# Patient Record
Sex: FEMALE | Race: WHITE | NOT HISPANIC OR LATINO | Employment: OTHER | URBAN - METROPOLITAN AREA
[De-identification: names, ages, dates, MRNs, and addresses within clinical notes are randomized per-mention and may not be internally consistent; named-entity substitution may affect disease eponyms.]

---

## 2017-02-09 DIAGNOSIS — G43.909 MIGRAINE WITHOUT STATUS MIGRAINOSUS, NOT INTRACTABLE, UNSPECIFIED MIGRAINE TYPE: Primary | ICD-10-CM

## 2017-02-09 NOTE — TELEPHONE ENCOUNTER
Pending Prescriptions:                       Disp   Refills    SUMAtriptan (IMITREX) 50 MG tablet        9 tabl*1            Sig: Take 1 tablet (50 mg) by mouth at onset of           headache for migraine May repeat dose in 2 hours.           Do not exceed 200 mg in 24 hours    LAST OV WAS ON 06/09/2015        Last Written Prescription Date: 02/09/2016  Last Fill Quantity: 9, # refills: 1  Last Office Visit with [facility], [facility] or [facility] prescribing provider: 06/09/2015       BP Readings from Last 3 Encounters:   02/09/16 110/60   07/10/15 108/58   06/09/15 118/73       Mian MCCANN

## 2017-02-10 NOTE — TELEPHONE ENCOUNTER
Routing refill request to provider for review/approval because:  Patient needs to be seen because it has been more than 1 year since last office visit.  Pt is not scheduled until March6, 2017  Mary Beth Izquierdo RN, BSN

## 2017-02-11 RX ORDER — SUMATRIPTAN 50 MG/1
50 TABLET, FILM COATED ORAL
Qty: 9 TABLET | Refills: 1 | Status: SHIPPED | OUTPATIENT
Start: 2017-02-11 | End: 2017-03-06

## 2017-02-16 DIAGNOSIS — G43.909 MIGRAINE WITHOUT STATUS MIGRAINOSUS, NOT INTRACTABLE, UNSPECIFIED MIGRAINE TYPE: ICD-10-CM

## 2017-02-16 RX ORDER — IBUPROFEN 800 MG/1
800 TABLET, FILM COATED ORAL EVERY 8 HOURS PRN
Qty: 30 TABLET | Refills: 1 | Status: SHIPPED | OUTPATIENT
Start: 2017-02-16 | End: 2017-10-11

## 2017-02-16 NOTE — TELEPHONE ENCOUNTER
Routing refill request to provider for review/approval because:  Patient needs to be seen because it has been more than 1 year since last office visit.  Mary Beth Izquierdo RN, BSN

## 2017-02-16 NOTE — TELEPHONE ENCOUNTER
Pending Prescriptions:                       Disp   Refills    ibuprofen (ADVIL/MOTRIN) 800 MG tablet    30 tab*1            Sig: Take 1 tablet (800 mg) by mouth every 8 hours as           needed for pain    LAST OV 07/15/2015        Last Written Prescription Date: 02/09/2016  Last Quantity: 30, # refills: 1  Last Office Visit with [facility], [facility] or [facility] prescribing provider: 07/10/2015  Next 5 appointments (look out 90 days)     Mar 06, 2017  1:00 PM CST   [facility] with [facility]    [address] [facility] [address] [phone]                   No results found for: CR  No results found for: AST  No results found for: ALT  BP Readings from Last 3 Encounters:   02/09/16 110/60   07/10/15 108/58   06/09/15 118/73     Yvette Dawkins RT(R)

## 2017-03-06 ENCOUNTER — OFFICE VISIT (OUTPATIENT)
Dept: FAMILY MEDICINE | Facility: CLINIC | Age: 42
End: 2017-03-06
Payer: MEDICARE

## 2017-03-06 VITALS
DIASTOLIC BLOOD PRESSURE: 70 MMHG | BODY MASS INDEX: 22.3 KG/M2 | SYSTOLIC BLOOD PRESSURE: 118 MMHG | HEIGHT: 64 IN | OXYGEN SATURATION: 97 % | TEMPERATURE: 98.6 F | HEART RATE: 95 BPM | WEIGHT: 130.6 LBS | RESPIRATION RATE: 18 BRPM

## 2017-03-06 DIAGNOSIS — J30.2 SEASONAL ALLERGIC RHINITIS, UNSPECIFIED ALLERGIC RHINITIS TRIGGER: ICD-10-CM

## 2017-03-06 DIAGNOSIS — G43.909 MIGRAINE WITHOUT STATUS MIGRAINOSUS, NOT INTRACTABLE, UNSPECIFIED MIGRAINE TYPE: ICD-10-CM

## 2017-03-06 PROCEDURE — T1013 SIGN LANG/ORAL INTERPRETER: HCPCS | Mod: U3 | Performed by: FAMILY MEDICINE

## 2017-03-06 PROCEDURE — 99213 OFFICE O/P EST LOW 20 MIN: CPT | Performed by: FAMILY MEDICINE

## 2017-03-06 RX ORDER — ONDANSETRON 4 MG/1
4 TABLET, ORALLY DISINTEGRATING ORAL EVERY 6 HOURS PRN
Qty: 20 TABLET | Refills: 0 | Status: SHIPPED | OUTPATIENT
Start: 2017-03-06 | End: 2019-11-01

## 2017-03-06 RX ORDER — SUMATRIPTAN 50 MG/1
50 TABLET, FILM COATED ORAL
Qty: 9 TABLET | Refills: 5 | Status: SHIPPED | OUTPATIENT
Start: 2017-03-06 | End: 2017-10-11

## 2017-03-06 RX ORDER — MOMETASONE FUROATE MONOHYDRATE 50 UG/1
2 SPRAY, METERED NASAL DAILY
Qty: 1 BOX | Refills: 11 | Status: SHIPPED | OUTPATIENT
Start: 2017-03-06 | End: 2019-01-22

## 2017-03-06 RX ORDER — CETIRIZINE HYDROCHLORIDE 10 MG/1
10 TABLET ORAL EVERY EVENING
Qty: 90 TABLET | Refills: 3 | Status: SHIPPED | OUTPATIENT
Start: 2017-03-06 | End: 2017-05-23 | Stop reason: SINTOL

## 2017-03-06 NOTE — MR AVS SNAPSHOT
"              After Visit Summary   3/6/2017    Sammie Ramirez    MRN: [id number]           Patient Information     Date Of Birth          1975        Visit Information        Provider Department      3/6/2017 1:00 PM Winnie Jean Justin Ray, MD [facility]        Today's Diagnoses     Seasonal allergic rhinitis, unspecified allergic rhinitis trigger        Migraine without status migrainosus, not intractable, unspecified migraine type           Follow-ups after your visit        Who to contact     If you have questions or need follow up information about today's clinic visit or your schedule please contact [facility] directly at [phone].  Normal or non-critical lab and imaging results will be communicated to you by MyChart, letter or phone within 4 business days after the clinic has received the results. If you do not hear from us within 7 days, please contact the clinic through MyChart or phone. If you have a critical or abnormal lab result, we will notify you by phone as soon as possible.  Submit refill requests through [employer] or call your pharmacy and they will forward the refill request to us. Please allow 3 business days for your refill to be completed.          Additional Information About Your Visit        MyChart Information     [employer] lets you send messages to your doctor, view your test results, renew your prescriptions, schedule appointments and more. To sign up, go to [email or web address] . Click on \"Log in\" on the left side of the screen, which will take you to the Welcome page. Then click on \"Sign up Now\" on the right side of the page.     You will be asked to enter the access code listed below, as well as some personal information. Please follow the directions to create your username and password.     Your access code is: [email or web address]  Expires: 2017  1:25 PM     Your access code will  in 90 days. If you need help or a new code, " "please call your [place] clinic or [phone].        Care EveryWhere ID     This is your Care EveryWhere ID. This could be used by other organizations to access your [place] medical records  [id number]        Your Vitals Were     Pulse Temperature Respirations Height Pulse Oximetry Breastfeeding?    95 98.6  F (37  C) (Oral) 18 5' 4\" (1.626 m) 97% No    BMI (Body Mass Index)                   22.42 kg/m2            Blood Pressure from Last 3 Encounters:   03/06/17 118/70   02/09/16 110/60   07/10/15 108/58    Weight from Last 3 Encounters:   03/06/17 130 lb 9.6 oz (59.2 kg)   02/09/16 129 lb 4.8 oz (58.7 kg)   07/10/15 132 lb 1.6 oz (59.9 kg)              Today, you had the following     No orders found for display         Today's Medication Changes          These changes are accurate as of: 3/6/17  1:25 PM.  If you have any questions, ask your nurse or doctor.               These medicines have changed or have updated prescriptions.        Dose/Directions    mometasone 50 MCG/ACT spray   Commonly known as:  NASONEX   This may have changed:  additional instructions   Used for:  Seasonal allergic rhinitis, unspecified allergic rhinitis trigger   Changed by:  Walter Wesley MD        Dose:  2 spray   Spray 2 sprays into both nostrils daily   Quantity:  1 Box   Refills:  11         Stop taking these medicines if you haven't already. Please contact your care team if you have questions.     benzonatate 200 MG capsule   Commonly known as:  TESSALON   Stopped by:  Walter Wesley MD           docusate sodium 100 MG tablet   Commonly known as:  COLACE   Stopped by:  Walter Wesley MD           levocetirizine 5 MG tablet   Commonly known as:  XYZAL   Stopped by:  Walter Wesley MD                Where to get your medicines      These medications were sent to [place] [address] [place] [facility] [address] [facility]  [address] [facility] [address]    Hours:  Tech issues with their phone system " Phone:  [phone]     cetirizine 10 MG tablet    mometasone 50 MCG/ACT spray    SUMAtriptan 50 MG tablet                Primary Care Provider Office Phone # Fax #    Walter Wesley [phone]       [facility] [address]  [facility] [address]        Thank you!     Thank you for choosing [facility]  for your care. Our goal is always to provide you with excellent care. Hearing back from our patients is one way we can continue to improve our services. Please take a few minutes to complete the written survey that you may receive in the mail after your visit with us. Thank you!             Your Updated Medication List - Protect others around you: Learn how to safely use, store and throw away your medicines at www.disposemymeds.org.          This list is accurate as of: 3/6/17  1:25 PM.  Always use your most recent med list.                   Brand Name Dispense Instructions for use    cetirizine 10 MG tablet    zyrTEC    90 tablet    Take 1 tablet (10 mg) by mouth every evening       ibuprofen 800 MG tablet    ADVIL/MOTRIN    30 tablet    Take 1 tablet (800 mg) by mouth every 8 hours as needed for pain       mometasone 50 MCG/ACT spray    NASONEX    1 Box    Spray 2 sprays into both nostrils daily       SUMAtriptan 50 MG tablet    IMITREX    9 tablet    Take 1 tablet (50 mg) by mouth at onset of headache for migraine May repeat dose in 2 hours.  Do not exceed 200 mg in 24 hours       triamcinolone 0.1 % cream    KENALOG    30 g    Apply sparingly to affected area three times daily for 14 days.

## 2017-03-06 NOTE — NURSING NOTE
"Chief Complaint   Patient presents with     Recheck Medication       Initial /70 (BP Location: Right arm, Patient Position: Chair, Cuff Size: Adult Regular)  Pulse 95  Temp 98.6  F (37  C) (Oral)  Resp 18  Ht 5' 4\" (1.626 m)  Wt 130 lb 9.6 oz (59.2 kg)  SpO2 97%  Breastfeeding? No  BMI 22.42 kg/m2 Estimated body mass index is 22.42 kg/(m^2) as calculated from the following:    Height as of this encounter: 5' 4\" (1.626 m).    Weight as of this encounter: 130 lb 9.6 oz (59.2 kg).    Medication Reconciliation: complete    Hayde Han, [facility]      Health Maintenance- Has Been Reviewed.                "

## 2017-03-06 NOTE — PROGRESS NOTES
"  SUBJECTIVE:                                                    Sammie Ramirez is a 41 year old female who presents to clinic today for the following health issues:    Patient presents with:  Recheck Medication    Patient has typical migraines with unilateral headache behind the high with nausea and vomiting with photophobia and phonophobia. Typically she has good relief with sumatriptan at 50 mg.  She previously was having higher frequency of headaches but now just one or 2 per month.    History of allergies.  Also has had allergy shots in the past. Needs nasal steroid occasionally to treat breakthrough symptoms.  Has tried Flonase which caused nausea. Nasonex working very well for but not covered by insurance.      Patient Active Problem List   Diagnosis     Sensorineural hearing loss, bilateral     SND (sensorineural deafness)     Seasonal allergic rhinitis     S/P LEEP of cervix     Migraine headache     Past Surgical History   Procedure Laterality Date     Leep tx, cervical  x 2 per chart     historical       Social History   Substance Use Topics     Smoking status: Never Smoker     Smokeless tobacco: Never Used     Alcohol use Yes      Comment: wine     Family History   Problem Relation Age of Onset     Family History Negative Mother      Family History Negative Father          ROS:  ENT/MOUTH: Nasal congestion and rhinorrhea  NEURO: NEGATIVE for weakness, dizziness or paresthesias    OBJECTIVE:                                                    /70 (BP Location: Right arm, Patient Position: Chair, Cuff Size: Adult Regular)  Pulse 95  Temp 98.6  F (37  C) (Oral)  Resp 18  Ht 5' 4\" (1.626 m)  Wt 130 lb 9.6 oz (59.2 kg)  SpO2 97%  Breastfeeding? No  BMI 22.42 kg/m2Body mass index is 22.42 kg/(m^2).  GENERAL APPEARANCE: healthy, alert and no distress  CV: regular rates and rhythm and no murmur, click or rub  NEURO: Normal strength and tone, mentation intact and speech normal     ASSESSMENT/PLAN:             "                                        1. Seasonal allergic rhinitis, unspecified allergic rhinitis trigger  Patient okbisi for prior authorization has she has used Flonase and that caused side effect with nausea so recommend Nasonex.  Has tried prescription antihistamines but did not work as well and caused sedation, prescription sent for Zyrtec and julee for PA as well.  - mometasone (NASONEX) 50 MCG/ACT spray; Spray 2 sprays into both nostrils daily  Dispense: 1 Box; Refill: 11  - cetirizine (ZYRTEC) 10 MG tablet; Take 1 tablet (10 mg) by mouth every evening  Dispense: 90 tablet; Refill: 3    2. Migraine without status migrainosus, not intractable, unspecified migraine type  Continue Imitrex with NSAID for abortive medication. Discussed consider daily preventative medication if symptoms not well controlled.  - SUMAtriptan (IMITREX) 50 MG tablet; Take 1 tablet (50 mg) by mouth at onset of headache for migraine May repeat dose in 2 hours.  Do not exceed 200 mg in 24 hours  Dispense: 9 tablet; Refill: 5  - ondansetron (ZOFRAN ODT) 4 MG ODT tab; Take 1 tablet (4 mg) by mouth every 6 hours as needed for nausea  Dispense: 20 tablet; Refill: 0    Walter Wesley MD  [facility]

## 2017-03-13 ENCOUNTER — TELEPHONE (OUTPATIENT)
Dept: FAMILY MEDICINE | Facility: CLINIC | Age: 42
End: 2017-03-13

## 2017-03-13 NOTE — TELEPHONE ENCOUNTER
Appointment not needed at this time.      We did not talk about work restrictions from allergies that I can remember.  If she is having allergies that are affecting her to the point of needing work restrictions she should probably see an allergist and consider allergy shots.     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  Forms that were dropped off have areas to complete for physical work restrictions - there are no physical restrictions so I'm not sure those forms accurately show the issue.  I can fill them out if needed for temporary restriction but allergies should be able to be treated and if not adequate treatment consider allergist follow-up.

## 2017-03-13 NOTE — TELEPHONE ENCOUNTER
Reason for Call:  Form, our goal is to have forms completed with 72 hours, however, some forms may require a visit or additional information.    Type of letter, form or note:  employer, [employer] Workability Report    Who is the form from?: [employer]    Where did the form come from: Patient dropped off.    What clinic location was the form placed at?: [facility]    Where the form was placed:  bin at     What number is listed as a contact on the form?:Sammie Ramirez [phone] will        Additional comments: last ov 3/6/17 with Dr Wesley for work comp.      Call taken on 3/13/2017 at 11:09 AM by Barbie Frank    Reason for Call:  Form, our goal is to have forms completed with 72 hours, however, some forms may require a visit or additional information.    Type of letter, form or note:  employer, [employer] Accommodation Questionnaire    Who is the form from?: [employer]    Where did the form come from: Pt dropped off    What clinic location was the form placed at?: [facility]    Where the form was placed:  bin at     What number is listed as a contact on the form?: Sammie Ramirez, ph. [phone], will        Additional comments: last ov 3/6/17 with Dr Wesley for work comp.    Call taken on 3/13/2017 at 11:15 AM by Barbie Frank

## 2017-03-13 NOTE — LETTER
[facility]          [address] [place] [address]                                                                                                       [phone]      Re: Sammie MARIA TERESA Ramirez  : 1975    To Whom It May Concern,    Patient has history of environmental allergies as well as migraine headaches.  Patient has had worsening allergies secondary to dust exposure at current work environment. This worsening has resulted in increased frequency of migraine headaches as a trigger for her headaches. Due to this issue we would recommend patient be moved from exposure to back room where there is significant dust exposure and be allowed to work in other areas if this is feasible.    If you have any questions or concerns, please contact our clinic [phone] or my direct line at [phone]      Sincerely,        Walter Wesley MD

## 2017-03-13 NOTE — TELEPHONE ENCOUNTER
Patient called back - she does not understand why she needs an appt when she just saw Dr Wesley last week - wants Dr Wesley to confirm that she needs to be seen before she will schedule

## 2017-03-13 NOTE — TELEPHONE ENCOUNTER
LVM-please have patient make a follow up visit with Dr. Wesley to fill out forms.      Form's are placed in form folder at the [employer].    Orin Woodard

## 2017-03-15 NOTE — TELEPHONE ENCOUNTER
LMR for patient.      Please read letter to patient from PCP due to work restrictions.  If it is ok, I do have a letter ready for  at the  along with the uncompleted forms.    Thanks!    Marjan Schwab

## 2017-03-15 NOTE — TELEPHONE ENCOUNTER
Patient given information below.    Patient states the migraines are due to the dust and would like a letter for this.    Will  forms (uncompleted) and the letter when finished.    Please inform patient.    Marjan Schwab

## 2017-03-16 NOTE — TELEPHONE ENCOUNTER
If patient calls back, please inform her that a letter and uncompleted forms are up at the  for patient to .  Orin Woodard

## 2017-05-01 ENCOUNTER — OFFICE VISIT (OUTPATIENT)
Dept: OBGYN | Facility: CLINIC | Age: 42
End: 2017-05-01
Payer: MEDICARE

## 2017-05-01 VITALS
SYSTOLIC BLOOD PRESSURE: 110 MMHG | HEART RATE: 71 BPM | WEIGHT: 132.1 LBS | HEIGHT: 64 IN | OXYGEN SATURATION: 99 % | DIASTOLIC BLOOD PRESSURE: 70 MMHG | TEMPERATURE: 97.7 F | BODY MASS INDEX: 22.55 KG/M2

## 2017-05-01 DIAGNOSIS — Z00.00 ROUTINE GENERAL MEDICAL EXAMINATION AT A HEALTH CARE FACILITY: Primary | ICD-10-CM

## 2017-05-01 PROCEDURE — 99396 PREV VISIT EST AGE 40-64: CPT | Performed by: FAMILY MEDICINE

## 2017-05-01 PROCEDURE — G0145 SCR C/V CYTO,THINLAYER,RESCR: HCPCS | Performed by: FAMILY MEDICINE

## 2017-05-01 PROCEDURE — T1013 SIGN LANG/ORAL INTERPRETER: HCPCS | Mod: U3 | Performed by: FAMILY MEDICINE

## 2017-05-01 PROCEDURE — G0476 HPV COMBO ASSAY CA SCREEN: HCPCS | Performed by: FAMILY MEDICINE

## 2017-05-01 PROCEDURE — 87624 HPV HI-RISK TYP POOLED RSLT: CPT | Performed by: FAMILY MEDICINE

## 2017-05-01 NOTE — MR AVS SNAPSHOT
After Visit Summary   5/1/2017    Sammie Ramirez    MRN: [id number]           Patient Information     Date Of Birth          1975        Visit Information        Provider Department      5/1/2017 3:00 PM Winnie Duque Kim Marie, DO [facility]        Today's Diagnoses     Routine general medical examination at a health care facility    -  1      Care Instructions    Schedule an appointment to complete your labs. They have Saturday appointments.      Dr. Krystin Sen, DO    OB/GYN   [facility] [phone]  [facility] [phone]            Follow-ups after your visit        Follow-up notes from your care team     Return in about 1 year (around 5/1/2018).      Future tests that were ordered for you today     Open Future Orders        Priority Expected Expires Ordered    CBC with platelets Routine  5/1/2018 5/1/2017    Comprehensive metabolic panel Routine  5/1/2018 5/1/2017    Lipid panel reflex to direct LDL Routine  5/1/2018 5/1/2017    TSH with free T4 reflex Routine  5/1/2018 5/1/2017            Who to contact     If you have questions or need follow up information about today's clinic visit or your schedule please contact [facility] directly at [phone].  Normal or non-critical lab and imaging results will be communicated to you by [employer], letter or phone within 4 business days after the clinic has received the results. If you do not hear from us within 7 days, please contact the clinic through [employer] or phone. If you have a critical or abnormal lab result, we will notify you by phone as soon as possible.  Submit refill requests through [employer] or call your pharmacy and they will forward the refill request to us. Please allow 3 business days for your refill to be completed.          Additional Information About Your Visit        [employer] Information     [employer] lets you send messages to your doctor, view your test  "results, renew your prescriptions, schedule appointments and more. To sign up, go to [email or web address] . Click on \"Log in\" on the left side of the screen, which will take you to the Welcome page. Then click on \"Sign up Now\" on the right side of the page.     You will be asked to enter the access code listed below, as well as some personal information. Please follow the directions to create your username and password.     Your access code is: [email or web address]  Expires: 2017  2:25 PM     Your access code will  in 90 days. If you need help or a new code, please call your [place] clinic or [phone].        Care EveryWhere ID     This is your Care EveryWhere ID. This could be used by other organizations to access your [place] medical records  [id number]        Your Vitals Were     Pulse Temperature Height Last Period Pulse Oximetry Breastfeeding?    71 97.7  F (36.5  C) (Oral) 5' 4\" (1.626 m) 2017 (Exact Date) 99% No    BMI (Body Mass Index)                   22.67 kg/m2            Blood Pressure from Last 3 Encounters:   17 110/70   17 118/70   16 110/60    Weight from Last 3 Encounters:   17 132 lb 1.6 oz (59.9 kg)   17 130 lb 9.6 oz (59.2 kg)   16 129 lb 4.8 oz (58.7 kg)               Primary Care Provider Office Phone # Fax #    Walter Wesley [phone]       [facility] [address] [facility] [address]        Thank you!     Thank you for choosing [facility]  for your care. Our goal is always to provide you with excellent care. Hearing back from our patients is one way we can continue to improve our services. Please take a few minutes to complete the written survey that you may receive in the mail after your visit with us. Thank you!             Your Updated Medication List - Protect others around you: Learn how to safely use, store and throw away your medicines at www.disposemymeds.org.          This " list is accurate as of: 5/1/17  3:36 PM.  Always use your most recent med list.                   Brand Name Dispense Instructions for use    cetirizine 10 MG tablet    zyrTEC    90 tablet    Take 1 tablet (10 mg) by mouth every evening       ibuprofen 800 MG tablet    ADVIL/MOTRIN    30 tablet    Take 1 tablet (800 mg) by mouth every 8 hours as needed for pain       mometasone 50 MCG/ACT spray    NASONEX    1 Box    Spray 2 sprays into both nostrils daily       ondansetron 4 MG ODT tab    ZOFRAN ODT    20 tablet    Take 1 tablet (4 mg) by mouth every 6 hours as needed for nausea       SUMAtriptan 50 MG tablet    IMITREX    9 tablet    Take 1 tablet (50 mg) by mouth at onset of headache for migraine May repeat dose in 2 hours.  Do not exceed 200 mg in 24 hours       triamcinolone 0.1 % cream    KENALOG    30 g    Apply sparingly to affected area three times daily for 14 days.

## 2017-05-01 NOTE — PROGRESS NOTES
SUBJECTIVE:  Sammie Ramirez is an 41 year old No obstetric history on file. woman who presents for   annual gyn exam. Patient's last menstrual period was 04/20/2017 (exact date). Periods are regular q 28-30 days, lasting   4-5 days. Dysmenorrhea:mild, occurring first 1-2 days of flow. Cyclic symptoms   include breast tenderness. Menarche at age 13. No intermenstrual bleeding,   spotting, or discharge.    Current contraception: none  BINA exposure: no  History of abnormal Pap smear: No  Family history of uterine or ovarian cancer: No  Regular self breast exam: Yes  History of abnormal mammogram: No  Family history of breast cancer: No  History of abnormal lipids: No    Past Medical History:   Diagnosis Date     ASCUS (atypical squamous cells of undetermined significance) on Pap smear 10/13, 2016    Neg HPV     LSIL (low grade squamous intraepithelial lesion) on Pap smear 2/2014        Family History   Problem Relation Age of Onset     Family History Negative Mother      Family History Negative Father        Past Surgical History:   Procedure Laterality Date     LEEP TX, CERVICAL  x 2 per chart    historical       Current Outpatient Prescriptions   Medication     mometasone (NASONEX) 50 MCG/ACT spray     cetirizine (ZYRTEC) 10 MG tablet     SUMAtriptan (IMITREX) 50 MG tablet     ibuprofen (ADVIL/MOTRIN) 800 MG tablet     triamcinolone (KENALOG) 0.1 % cream     ondansetron (ZOFRAN ODT) 4 MG ODT tab     No current facility-administered medications for this visit.      Allergies   Allergen Reactions     Amoxicillin      Rash, hives     Codeine Sulfate      Lactose      Seasonal Allergies        Social History   Substance Use Topics     Smoking status: Never Smoker     Smokeless tobacco: Never Used     Alcohol use Yes      Comment: wine       Review Of Systems  Ears/Nose/Throat: negative  Respiratory: No shortness of breath, dyspnea on exertion, cough, or hemoptysis  Cardiovascular: negative  Gastrointestinal:  "negative  Genitourinary: negative    This document serves as a record of the services and decisions personally performed and made by Krystin Sen DO. It was created on his/her behalf by Seamus Trotter, a trained medical scribe. The creation of this document is based the provider's statements to the medical scribe.  Scribe Seamus Trotter 3:29 PM, May 1, 2017    OBJECTIVE:  /70 (BP Location: Right arm, Patient Position: Chair, Cuff Size: Adult Regular)  Pulse 71  Temp 97.7  F (36.5  C) (Oral)  Ht 5' 4\" (1.626 m)  Wt 132 lb 1.6 oz (59.9 kg)  LMP 04/20/2017 (Exact Date)  SpO2 99%  Breastfeeding? No  BMI 22.67 kg/m2    General appearance: healthy, alert and no distress  Skin: Skin color, texture, turgor normal. No rashes or lesions.  Ears: negative  Nose/Sinuses: Nares normal. Septum midline. Mucosa normal. No drainage or sinus tenderness.  Oropharynx: Lips, mucosa, and tongue normal. Teeth and gums normal.  Neck: Neck supple. No adenopathy. Thyroid symmetric, normal size,, Carotids without bruits.  Lungs: negative, Percussion normal. Good diaphragmatic excursion. Lungs clear  Heart: negative, PMI normal. No lifts, heaves, or thrills. RRR. No murmurs, clicks gallops or rub  Breasts: Inspection negative. No nipple discharge or bleeding. No masses.  Abdomen: Abdomen soft, non-tender. BS normal. No masses, organomegaly  Pelvic: Pelvic:  Pelvic examination with pap/ Gonorrhea and Chlamydia   including  External genitalia normal   and vagina normal rugatted not atrophic  Examination of urethra  normal no masses, tenderness, scarring  bladder, no masses or tenderness  Cervix no lesions or discharge  Bimanual exam with   Uterus 6 weeks size, mid position, mobile, non-tenderness, no descent   Adnexa/parametria  normal  Rectovaginal deferred    ASSESSMENT:  Sammie Ramirez is an 41 year old No obstetric history on file. woman who presents for   annual gyn exam. Concerns: none    PLAN:  Dx:  1)  Pap smear  2)  " Mammography, lipids at appropriate intervals    Rx:  N/A    PE:  Reviewed health maintenance including diet, regular exercise   and periodic exams.    The information in this document, created by the medical scribe for me, accurately reflects the services I personally performed and the decisions made by me. I have reviewed and approved this document for accuracy prior to leaving the patient care area.    3:29 PM, 05/01/17    Dr. Krystin Sen, DO    Obstetrics and Gynecology  [facility]

## 2017-05-01 NOTE — LETTER
May 9, 2017    Sammie Ramirez  [address] [facility] [place] [address]    Dear Sammie,  We are happy to inform you that your PAP smear result from 05/01/17 is normal.  We are now able to do a follow up test on PAP smears. The DNA test is for HPV (Human Papilloma Virus). Cervical cancer is closely linked with certain types of HPV. Your result showed no evidence of high risk HPV.  Therefore we recommend you return in 1 year for your next pap smear and HPV test.  You will also need to return to the clinic every year for an annual exam and other preventive tests.  Please contact the clinic at [phone] with any questions.  Sincerely,    Krystin Sen DO/glenn

## 2017-05-01 NOTE — NURSING NOTE
"Chief Complaint   Patient presents with     Physical       Initial /70 (BP Location: Right arm, Patient Position: Chair, Cuff Size: Adult Regular)  Pulse 71  Temp 97.7  F (36.5  C) (Oral)  Ht 5' 4\" (1.626 m)  Wt 132 lb 1.6 oz (59.9 kg)  LMP 04/20/2017 (Exact Date)  SpO2 99%  Breastfeeding? No  BMI 22.67 kg/m2 Estimated body mass index is 22.67 kg/(m^2) as calculated from the following:    Height as of this encounter: 5' 4\" (1.626 m).    Weight as of this encounter: 132 lb 1.6 oz (59.9 kg).  Medication Reconciliation: complete   TANNER Hubbard      "

## 2017-05-03 LAB
COPATH REPORT: NORMAL
PAP: NORMAL

## 2017-05-05 LAB
FINAL DIAGNOSIS: NORMAL
HPV HR 12 DNA CVX QL NAA+PROBE: NEGATIVE
HPV16 DNA SPEC QL NAA+PROBE: NEGATIVE
HPV18 DNA SPEC QL NAA+PROBE: NEGATIVE
SPECIMEN DESCRIPTION: NORMAL

## 2017-05-23 ENCOUNTER — TELEPHONE (OUTPATIENT)
Dept: FAMILY MEDICINE | Facility: CLINIC | Age: 42
End: 2017-05-23

## 2017-05-23 RX ORDER — DIPHENHYDRAMINE HCL 25 MG
25-50 TABLET ORAL
Qty: 60 TABLET | Refills: 1 | COMMUNITY
Start: 2017-05-23 | End: 2017-10-11

## 2017-05-23 RX ORDER — FEXOFENADINE HCL 180 MG/1
180 TABLET ORAL DAILY
Qty: 90 TABLET | Refills: 0 | COMMUNITY
Start: 2017-05-23 | End: 2021-03-03

## 2017-05-23 NOTE — TELEPHONE ENCOUNTER
"Pt calling with increased allergy symptoms -   \"my eyes are very red and swollen\"    Pt states she is taking Allegra 180 mg am and benadryl at hs.   She has tried multiple OTC eye drops however she is not sure which ones but does know she has used \"Visine with no help\"     Advised as medications are not helpful may want to see allergist -  Pt states she has seen one in the past and is waiting on records to review them before she decides about seeing one again.   Can try OCT Zaditor or Opcon A along with saline eye drops.   Continue with antihistamines.   Would not use Visine as this can make symptoms worse.     Be seen if not improving.     Pt expressed understanding and acceptance of the plan.  Pt had no further questions at this time.  Advised can call back to clinic at any time with concerns.       Marysol Moralez RN    "

## 2017-10-04 ENCOUNTER — TELEPHONE (OUTPATIENT)
Dept: FAMILY MEDICINE | Facility: CLINIC | Age: 42
End: 2017-10-04

## 2017-10-04 NOTE — TELEPHONE ENCOUNTER
"Pt calling states she has paperwork and letter done for SSI disability-   She states she has this due to being deaf and \"severe migraines\" and she can only work PT due to these issues.     Advised pt needs appt to discuss this with PCP to review forms and writer letter.     Pt expressed understanding and acceptance of the plan.  Pt had no further questions at this time.  Advised can call back to clinic at any time with concerns.       Marysol Moralez RN    "

## 2017-10-11 ENCOUNTER — OFFICE VISIT (OUTPATIENT)
Dept: FAMILY MEDICINE | Facility: CLINIC | Age: 42
End: 2017-10-11
Payer: MEDICARE

## 2017-10-11 VITALS
HEART RATE: 53 BPM | OXYGEN SATURATION: 98 % | DIASTOLIC BLOOD PRESSURE: 60 MMHG | SYSTOLIC BLOOD PRESSURE: 110 MMHG | WEIGHT: 134 LBS | RESPIRATION RATE: 19 BRPM | BODY MASS INDEX: 22.88 KG/M2 | HEIGHT: 64 IN | TEMPERATURE: 98.1 F

## 2017-10-11 DIAGNOSIS — G43.909 MIGRAINE WITHOUT STATUS MIGRAINOSUS, NOT INTRACTABLE, UNSPECIFIED MIGRAINE TYPE: ICD-10-CM

## 2017-10-11 PROCEDURE — 99213 OFFICE O/P EST LOW 20 MIN: CPT | Performed by: FAMILY MEDICINE

## 2017-10-11 RX ORDER — IBUPROFEN 800 MG/1
800 TABLET, FILM COATED ORAL EVERY 8 HOURS PRN
Qty: 30 TABLET | Refills: 5 | Status: SHIPPED | OUTPATIENT
Start: 2017-10-11 | End: 2018-05-15

## 2017-10-11 RX ORDER — SUMATRIPTAN 50 MG/1
50 TABLET, FILM COATED ORAL
Qty: 9 TABLET | Refills: 5 | Status: SHIPPED | OUTPATIENT
Start: 2017-10-11 | End: 2019-08-13

## 2017-10-11 NOTE — MR AVS SNAPSHOT
"              After Visit Summary   10/11/2017    Sammie Ramirez    MRN: [id number]           Patient Information     Date Of Birth          1975        Visit Information        Provider Department      10/11/2017 1:00 PM Walter Wesley MD; ASL IS [employer]        Today's Diagnoses     Migraine without status migrainosus, not intractable, unspecified migraine type           Follow-ups after your visit        Who to contact     If you have questions or need follow up information about today's clinic visit or your schedule please contact [facility] directly at [phone].  Normal or non-critical lab and imaging results will be communicated to you by Picanovahart, letter or phone within 4 business days after the clinic has received the results. If you do not hear from us within 7 days, please contact the clinic through Picanovahart or phone. If you have a critical or abnormal lab result, we will notify you by phone as soon as possible.  Submit refill requests through Tipjoy or call your pharmacy and they will forward the refill request to us. Please allow 3 business days for your refill to be completed.          Additional Information About Your Visit        MyChart Information     Tipjoy lets you send messages to your doctor, view your test results, renew your prescriptions, schedule appointments and more. To sign up, go to [email or web address] . Click on \"Log in\" on the left side of the screen, which will take you to the Welcome page. Then click on \"Sign up Now\" on the right side of the page.     You will be asked to enter the access code listed below, as well as some personal information. Please follow the directions to create your username and password.     Your access code is: [email or web address]  Expires: 2018  8:59 AM     Your access code will  in 90 days. If you need help or a new code, please call your [facility] or [phone].        Care EveryWhere ID     This " "is your Care EveryWhere ID. This could be used by other organizations to access your [place] medical records  [id number]        Your Vitals Were     Pulse Temperature Respirations Height Pulse Oximetry Breastfeeding?    53 98.1  F (36.7  C) (Oral) 19 5' 4\" (1.626 m) 98% No    BMI (Body Mass Index)                   23 kg/m2            Blood Pressure from Last 3 Encounters:   10/11/17 110/60   05/01/17 110/70   03/06/17 118/70    Weight from Last 3 Encounters:   10/11/17 134 lb (60.8 kg)   05/01/17 132 lb 1.6 oz (59.9 kg)   03/06/17 130 lb 9.6 oz (59.2 kg)              Today, you had the following     No orders found for display         Today's Medication Changes          These changes are accurate as of: 10/11/17 11:59 PM.  If you have any questions, ask your nurse or doctor.               Stop taking these medicines if you haven't already. Please contact your care team if you have questions.     diphenhydrAMINE 25 MG tablet   Commonly known as:  BENADRYL   Stopped by:  Walter Wesley MD                Where to get your medicines      These medications were sent to [facility] [id number]    Hours:  Tech issues with their phone system Phone:  [phone]     ibuprofen 800 MG tablet    SUMAtriptan 50 MG tablet                Primary Care Provider Office Phone # Fax #    Walter Wesley [phone] [address] [facility] [address] [facility] [address]        Equal Access to Services     [facility]: Hadii reyes turner hadasho Soomaali, waaxda luqadaha, qaybta kaalmada ricco taylor idiin hayaan adeeg kharash la'aan . So [facility] [phone].    ATENCIÓN: Si habla español, tiene a carlos disposición servicios gratuitos de asistencia lingüística. Llame al [phone].    We comply with applicable federal civil rights laws and [place] laws. We do not discriminate on the basis of race, color, national origin, age, disability, sex, " sexual orientation, or gender identity.            Thank you!     Thank you for choosing [facility]  for your care. Our goal is always to provide you with excellent care. Hearing back from our patients is one way we can continue to improve our services. Please take a few minutes to complete the written survey that you may receive in the mail after your visit with us. Thank you!             Your Updated Medication List - Protect others around you: Learn how to safely use, store and throw away your medicines at www.disposemymeds.org.          This list is accurate as of: 10/11/17 11:59 PM.  Always use your most recent med list.                   Brand Name Dispense Instructions for use Diagnosis    fexofenadine 180 MG tablet    ALLEGRA    90 tablet    Take 1 tablet (180 mg) by mouth daily        ibuprofen 800 MG tablet    ADVIL/MOTRIN    30 tablet    Take 1 tablet (800 mg) by mouth every 8 hours as needed for pain    Migraine without status migrainosus, not intractable, unspecified migraine type       mometasone 50 MCG/ACT spray    NASONEX    1 Box    Spray 2 sprays into both nostrils daily    Seasonal allergic rhinitis, unspecified allergic rhinitis trigger       ondansetron 4 MG ODT tab    ZOFRAN ODT    20 tablet    Take 1 tablet (4 mg) by mouth every 6 hours as needed for nausea    Migraine without status migrainosus, not intractable, unspecified migraine type       SUMAtriptan 50 MG tablet    IMITREX    9 tablet    Take 1 tablet (50 mg) by mouth at onset of headache for migraine May repeat dose in 2 hours.  Do not exceed 200 mg in 24 hours    Migraine without status migrainosus, not intractable, unspecified migraine type       triamcinolone 0.1 % cream    KENALOG    30 g    Apply sparingly to affected area three times daily for 14 days.    Dermatitis

## 2017-10-11 NOTE — NURSING NOTE
"Chief Complaint   Patient presents with     Letter for School/Work       Initial /60 (BP Location: Right arm, Patient Position: Chair, Cuff Size: Adult Regular)  Pulse 53  Temp 98.1  F (36.7  C) (Oral)  Resp 19  Ht 5' 4\" (1.626 m)  Wt 134 lb (60.8 kg)  SpO2 98%  Breastfeeding? No  BMI 23 kg/m2 Estimated body mass index is 23 kg/(m^2) as calculated from the following:    Height as of this encounter: 5' 4\" (1.626 m).    Weight as of this encounter: 134 lb (60.8 kg).    Medication Reconciliation: complete    Hayde Han [facility]      Health Maintenance- Has Been Reviewed.                "

## 2017-10-11 NOTE — LETTER
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    Patient has history of migraine headaches that may limit her ability to work full time as she is unable to work with migraine symptoms when they occur.  Migraines treated with prescription medication.    Patient with history of seasonal allergies treated with prescription medication.    Please contact me for questions or concerns.      Sincerely,      Walter Wesley MD

## 2017-10-19 NOTE — PROGRESS NOTES
"  SUBJECTIVE:                                                    Sammie Ramirez is a 41 year old female who presents to clinic today for the following health issues:    Patient presents with:  Letter for School/Work    Patient here for a letter for work for intermittent work leave secondary to migraine headache issues.  She needs a letter for work confirming that she has migraine headaches that limit her work ability at times and may need FMLA paperwork stating same. She is getting 1-3 migraine headaches per month and is essentially unable to work when she has them.  Vision is anxious about working full time with this issue and is hoping to not have full-time hours because of work limitation from migraine headaches. Imitrex has been helpful when she needs abortive therapy.    Patient with congenital deafness and also needs a letter stating such for work.    ROS:  ENT/MOUTH: As noted above   NEURO: As noted above    OBJECTIVE:                                                    /60 (BP Location: Right arm, Patient Position: Chair, Cuff Size: Adult Regular)  Pulse 53  Temp 98.1  F (36.7  C) (Oral)  Resp 19  Ht 5' 4\" (1.626 m)  Wt 134 lb (60.8 kg)  SpO2 98%  Breastfeeding? No  BMI 23 kg/m2Body mass index is 23 kg/(m^2).  GENERAL APPEARANCE: healthy, alert and no distress     ASSESSMENT/PLAN:                                                    1. Migraine without status migrainosus, not intractable, unspecified migraine type  Continue current management of migraines with effective abortive therapy and no indication at this time to use prophylactic medication rather avoid triggers at this time. Letter written for work. If to fill out paperwork for FMLA as needed.  - SUMAtriptan (IMITREX) 50 MG tablet; Take 1 tablet (50 mg) by mouth at onset of headache for migraine May repeat dose in 2 hours.  Do not exceed 200 mg in 24 hours  Dispense: 9 tablet; Refill: 5  - ibuprofen (ADVIL/MOTRIN) 800 MG tablet; Take 1 " tablet (800 mg) by mouth every 8 hours as needed for pain  Dispense: 30 tablet; Refill: 5    Walter Wesley MD  [facility]

## 2018-05-15 ENCOUNTER — OFFICE VISIT (OUTPATIENT)
Dept: OBGYN | Facility: CLINIC | Age: 43
End: 2018-05-15
Payer: MEDICARE

## 2018-05-15 VITALS
DIASTOLIC BLOOD PRESSURE: 62 MMHG | BODY MASS INDEX: 22.83 KG/M2 | WEIGHT: 133.7 LBS | HEIGHT: 64 IN | SYSTOLIC BLOOD PRESSURE: 100 MMHG

## 2018-05-15 DIAGNOSIS — Z98.890 S/P LEEP OF CERVIX: Primary | ICD-10-CM

## 2018-05-15 DIAGNOSIS — Z12.31 ENCOUNTER FOR SCREENING MAMMOGRAM FOR MALIGNANT NEOPLASM OF BREAST: ICD-10-CM

## 2018-05-15 DIAGNOSIS — L30.9 DERMATITIS: ICD-10-CM

## 2018-05-15 DIAGNOSIS — G43.909 MIGRAINE WITHOUT STATUS MIGRAINOSUS, NOT INTRACTABLE, UNSPECIFIED MIGRAINE TYPE: ICD-10-CM

## 2018-05-15 DIAGNOSIS — Z00.00 ENCOUNTER FOR MEDICARE ANNUAL WELLNESS EXAM: ICD-10-CM

## 2018-05-15 DIAGNOSIS — Z12.31 ENCOUNTER FOR SCREENING MAMMOGRAM FOR BREAST CANCER: ICD-10-CM

## 2018-05-15 PROCEDURE — 88175 CYTOPATH C/V AUTO FLUID REDO: CPT | Performed by: FAMILY MEDICINE

## 2018-05-15 PROCEDURE — T1013 SIGN LANG/ORAL INTERPRETER: HCPCS | Mod: U3 | Performed by: FAMILY MEDICINE

## 2018-05-15 PROCEDURE — G0101 CA SCREEN;PELVIC/BREAST EXAM: HCPCS | Performed by: FAMILY MEDICINE

## 2018-05-15 PROCEDURE — 87624 HPV HI-RISK TYP POOLED RSLT: CPT | Performed by: FAMILY MEDICINE

## 2018-05-15 PROCEDURE — 99396 PREV VISIT EST AGE 40-64: CPT | Performed by: FAMILY MEDICINE

## 2018-05-15 PROCEDURE — G0476 HPV COMBO ASSAY CA SCREEN: HCPCS | Performed by: FAMILY MEDICINE

## 2018-05-15 PROCEDURE — 88141 CYTOPATH C/V INTERPRET: CPT | Performed by: FAMILY MEDICINE

## 2018-05-15 RX ORDER — IBUPROFEN 800 MG/1
800 TABLET, FILM COATED ORAL EVERY 8 HOURS PRN
Qty: 90 TABLET | Refills: 3 | Status: SHIPPED | OUTPATIENT
Start: 2018-05-15 | End: 2019-09-26

## 2018-05-15 RX ORDER — TRIAMCINOLONE ACETONIDE 1 MG/G
CREAM TOPICAL
Qty: 30 G | Refills: 3 | Status: SHIPPED | OUTPATIENT
Start: 2018-05-15 | End: 2020-05-07

## 2018-05-15 NOTE — MR AVS SNAPSHOT
After Visit Summary   5/15/2018    Sammie Ramirez    MRN: [id number]           Patient Information     Date Of Birth          1975        Visit Information        Provider Department      5/15/2018 2:00 PM Bertha Chavez; Krystin Sen, DO [facility]        Today's Diagnoses     S/P LEEP of cervix    -  1    Encounter for Medicare annual wellness exam          Care Instructions    Return yearly      Call Lab [phone] [place] or [phone] [place] to schedule future labs. You may schedule   The labs at any [facility].  If you are getting cholesterol checked please fast 8 hours     Dr. Krystin Sen, DO    Obstetrics and Gynecology  [facility] [place]                        Follow-ups after your visit        Future tests that were ordered for you today     Open Future Orders        Priority Expected Expires Ordered    CBC with platelets Routine  5/15/2019 5/15/2018    Comprehensive metabolic panel Routine  5/15/2019 5/15/2018    Lipid panel reflex to direct LDL Fasting Routine  5/15/2019 5/15/2018    TSH with free T4 reflex Routine  5/15/2019 5/15/2018            Who to contact     If you have questions or need follow up information about today's clinic visit or your schedule please contact [facility] directly at [phone].  Normal or non-critical lab and imaging results will be communicated to you by MyChart, letter or phone within 4 business days after the clinic has received the results. If you do not hear from us within 7 days, please contact the clinic through GoToTagshart or phone. If you have a critical or abnormal lab result, we will notify you by phone as soon as possible.  Submit refill requests through iWantoo or call your pharmacy and they will forward the refill request to us. Please allow 3 business days for your refill to be completed.          Additional Information About Your Visit        MyChart  "Information     [facility] lets you send messages to your doctor, view your test results, renew your prescriptions, schedule appointments and more. To sign up, go to [email or web address] . Click on \"Log in\" on the left side of the screen, which will take you to the Welcome page. Then click on \"Sign up Now\" on the right side of the page.     You will be asked to enter the access code listed below, as well as some personal information. Please follow the directions to create your username and password.     Your access code is: [email or web address]  Expires: 2018  2:00 PM     Your access code will  in 90 days. If you need help or a new code, please call your [place] clinic or [phone].        Care EveryWhere ID     This is your Care EveryWhere ID. This could be used by other organizations to access your [place] medical records  [id number]        Your Vitals Were     Height BMI (Body Mass Index)                5' 4\" (1.626 m) 22.95 kg/m2           Blood Pressure from Last 3 Encounters:   05/15/18 100/62   10/11/17 110/60   17 110/70    Weight from Last 3 Encounters:   05/15/18 133 lb 11.2 oz (60.6 kg)   10/11/17 134 lb (60.8 kg)   17 132 lb 1.6 oz (59.9 kg)               Primary Care Provider Office Phone # Fax #    Walter Wesley [phone] [address]  [facility] [address]        Equal Access to Services     [facility]: Hadii reyes turner hadasho Soomaali, waaxda luqadaha, qaybta kaalmada ricco taylor. So [facility] [phone].    ATENCIÓN: Si habla español, tiene a carlos disposición servicios gratuitos de asistencia lingüística. Llame al [phone].    We comply with applicable federal civil rights laws and [place] laws. We do not discriminate on the basis of race, color, national origin, age, disability, sex, sexual orientation, or gender identity.            Thank you!     Thank you for choosing [facility]  for " your care. Our goal is always to provide you with excellent care. Hearing back from our patients is one way we can continue to improve our services. Please take a few minutes to complete the written survey that you may receive in the mail after your visit with us. Thank you!             Your Updated Medication List - Protect others around you: Learn how to safely use, store and throw away your medicines at www.disposemymeds.org.          This list is accurate as of 5/15/18  2:11 PM.  Always use your most recent med list.                   Brand Name Dispense Instructions for use Diagnosis    fexofenadine 180 MG tablet    ALLEGRA    90 tablet    Take 1 tablet (180 mg) by mouth daily        ibuprofen 800 MG tablet    ADVIL/MOTRIN    30 tablet    Take 1 tablet (800 mg) by mouth every 8 hours as needed for pain    Migraine without status migrainosus, not intractable, unspecified migraine type       mometasone 50 MCG/ACT spray    NASONEX    1 Box    Spray 2 sprays into both nostrils daily    Seasonal allergic rhinitis, unspecified allergic rhinitis trigger       ondansetron 4 MG ODT tab    ZOFRAN ODT    20 tablet    Take 1 tablet (4 mg) by mouth every 6 hours as needed for nausea    Migraine without status migrainosus, not intractable, unspecified migraine type       SUMAtriptan 50 MG tablet    IMITREX    9 tablet    Take 1 tablet (50 mg) by mouth at onset of headache for migraine May repeat dose in 2 hours.  Do not exceed 200 mg in 24 hours    Migraine without status migrainosus, not intractable, unspecified migraine type       triamcinolone 0.1 % cream    KENALOG    30 g    Apply sparingly to affected area three times daily for 14 days.    Dermatitis

## 2018-05-15 NOTE — PROGRESS NOTES
SUBJECTIVE:  PATIENT DEAF -  PRESENT FOR VISIT  Sammie Ramirze is an 42 year old woman who presents for   annual wellness visit. No LMP recorded. Periods are regular q 28-30 days, lasting   1-2 days. Dysmenorrhea: moderate, lasting throughout menses. Cyclic symptoms include none. No intermenstrual bleeding, spotting, or discharge.  STD hx: none.    Current contraception: none  BINA exposure: no  History of abnormal Pap smear: Yes   Hx of LEEP x 2   2/14/12: LSIL   10/3/12: NIL.  Plan pap in 1 yr.   10/2013 ASCUS neg HPV.  Plan pap in 1 yr. Per [facility] algorithms, needs two  negative cotests at 12 & 24 months after LEEP before going to cotest in 3 yrs.   10/2014: NIL pap, neg HPV. Plan cotest pap & HPV in 1 year. Tracking started.   2/9/2016 Ascus Neg HPV, plan: cotest one year per MD. Tracking.    05/01/17: NIL pap, Neg HR HPV result. Plan cotest in 1 year per provider.  Family history of uterine or ovarian cancer: UNKNOWN  Regular self breast exam: Yes  History of abnormal mammogram: No  Family history of breast cancer: UNKNOWN  History of abnormal lipids: No      - Pt would like to know if there is a possibility of HPV recurrence -- PMHx before she moved here, all testing done since has been negative.    - Pt also reports an itchy, irritated rash on her inner groin bilaterally. Similar episodes occurred during her pregnancy, but has recently presented again. She was previously prescribed a cream, felt this helped alleviate her symptoms.       Past Medical History:   Diagnosis Date     ASCUS (atypical squamous cells of undetermined significance) on Pap smear 10/13, 2016    Neg HPV     LSIL (low grade squamous intraepithelial lesion) on Pap smear 2/2014        Family History   Problem Relation Age of Onset     Family History Negative Mother      Family History Negative Father        Past Surgical History:   Procedure Laterality Date     LEEP TX, CERVICAL  x 2 per chart    historical       Current Outpatient  "Prescriptions   Medication     ibuprofen (ADVIL/MOTRIN) 800 MG tablet     triamcinolone (KENALOG) 0.1 % cream     fexofenadine (ALLEGRA) 180 MG tablet     mometasone (NASONEX) 50 MCG/ACT spray     ondansetron (ZOFRAN ODT) 4 MG ODT tab     SUMAtriptan (IMITREX) 50 MG tablet     No current facility-administered medications for this visit.      Allergies   Allergen Reactions     Amoxicillin      Rash, hives     Codeine Sulfate      Lactose      Seasonal Allergies        Social History   Substance Use Topics     Smoking status: Never Smoker     Smokeless tobacco: Never Used     Alcohol use Yes      Comment: wine       Review Of Systems  Ears/Nose/Throat: negative  Respiratory: No shortness of breath, dyspnea on exertion, cough, or hemoptysis  Cardiovascular: negative  Gastrointestinal: negative  Genitourinary: negative  Constitutional, HEENT, cardiovascular, pulmonary, GI, , musculoskeletal, neuro, skin, endocrine and psych systems are negative, except as otherwise noted.      This document serves as a record of the services and decisions personally performed and made by Krystin Sen DO. It was created on her behalf by Julia Belle, a trained medical scribe. The creation of this document is based the provider's statements to the medical scribe.  Scribe Julia Belle 2:11 PM, May 15, 2018    OBJECTIVE:  /62  Ht 1.626 m (5' 4\")  Wt 60.6 kg (133 lb 11.2 oz)  BMI 22.95 kg/m2  General appearance: healthy, alert and no distress  Skin: Skin color, texture, turgor normal. No rashes or lesions.  Ears: negative  Nose/Sinuses: Nares normal. Septum midline. Mucosa normal. No drainage or sinus tenderness.  Oropharynx: Lips, mucosa, and tongue normal. Teeth and gums normal.  Neck: Neck supple. No adenopathy. Thyroid symmetric, normal size,, Carotids without bruits.  Lungs: negative, Percussion normal. Good diaphragmatic excursion. Lungs clear  Heart: negative, PMI normal. No lifts, heaves, or thrills. RRR. No murmurs, " clicks gallops or rub  Breasts: Inspection negative. No nipple discharge or bleeding. No masses.  Abdomen: Abdomen soft, non-tender. BS normal. No masses, organomegaly  Pelvic: Pelvic examination with pap/without Gonorrhea and Chlamydia   including  External genitalia normal   and vagina normal rugatted not atrophic  Examination of urethra  normal no masses, tenderness, scarring  bladder, no masses or tenderness  Cervix no lesions or discharge  Bimanual exam with   Uterus 6 weeks size, mid position, mobile, no tenderness, no descent   Adnexa/parametria normal        ASSESSMENT:  Sammie Ramirez is an 42 year old woman who presents for   annual wellness visit. Concerns:  No concerns today      PLAN:  Dx: Annual wellness visit; Skin rash  1)  Will return for fasting labs only visit; Pap smear - pathology pending, informed HPV can reoccur & will be checked each year; Mammogram ordered, Pt will complete as informed by Medicare; Ibuprofen rx refilled, to alleviate Dysmenorrhea & Migraines  2)  Skin rash, not present today but in past: Prescription cream refilled, will apply when symptoms arise [not present today during clinic]  3)  Return to clinic in 1 year for annual wellness visit, otherwise prn.        Rx:  - ibuprofen 800 mg, 1 tab po every 8 hours as needed for pain  - Kenalog 0.1%, apply sparingly to affected area TID for 14 days      PE:  Reviewed health maintenance including diet, regular exercise   and periodic exams.        The information in this document, created by the medical scribe for me, accurately reflects the services I personally performed and the decisions made by me. I have reviewed and approved this document for accuracy prior to leaving the patient care area.  2:11 PM, 05/15/18    Dr. Krystin Sen, DO    Obstetrics and Gynecology  [facility]

## 2018-05-15 NOTE — PATIENT INSTRUCTIONS
Return yearly      Call Lab [phone] [place] or [phone] [place] to schedule future labs. You may schedule   The labs at any [place] facitily.  If you are getting cholesterol checked please fast 8 hours     Dr. Krystin Sen, DO    Obstetrics and Gynecology  [facility] [place] [facility] [place]

## 2018-05-15 NOTE — NURSING NOTE
"Chief Complaint   Patient presents with     Gyn Exam     pap due--discuss HPV       Initial /62  Ht 5' 4\" (1.626 m)  Wt 133 lb 11.2 oz (60.6 kg)  BMI 22.95 kg/m2 Estimated body mass index is 22.95 kg/(m^2) as calculated from the following:    Height as of this encounter: 5' 4\" (1.626 m).    Weight as of this encounter: 133 lb 11.2 oz (60.6 kg).  BP completed using cuff size: regular    No obstetric history on file.    The following HM Due: pap smear        "

## 2018-05-22 ENCOUNTER — TELEPHONE (OUTPATIENT)
Dept: AUDIOLOGY | Facility: CLINIC | Age: 43
End: 2018-05-22
Payer: MEDICARE

## 2018-05-22 DIAGNOSIS — H90.3 SENSORINEURAL HEARING LOSS, BILATERAL: Primary | ICD-10-CM

## 2018-05-22 LAB
COPATH REPORT: ABNORMAL
PAP: ABNORMAL

## 2018-05-22 PROCEDURE — V5266 BATTERY FOR HEARING DEVICE: HCPCS | Performed by: AUDIOLOGIST

## 2018-05-22 NOTE — TELEPHONE ENCOUNTER
Patient calls to request her 3 month supply of batteries be mailed to her home address. Mailed 30 size 13 batteries to the address on file.     CHARGES:   .002 x30 Batteries ($1). Total: $30 BILL TO PATIENT DIRECTLY.     Roshan Holbrook CCC-A  Licensed Audiologist #[id number]  5/22/2018

## 2018-05-22 NOTE — TELEPHONE ENCOUNTER
Reason for Call:  Other     Detailed comments: patient asking for more batteries. Patient's address has been verified and would like the batteries mailed to her home along with the bill.    Phone Number Patient can be reached at: Home number on file [phone] (home)    Best Time: any time    Can we leave a detailed message on this number? YES    Call taken on 5/22/2018 at 2:57 PM by Destiny Abreu

## 2018-05-23 LAB
FINAL DIAGNOSIS: NORMAL
HPV HR 12 DNA CVX QL NAA+PROBE: NEGATIVE
HPV16 DNA SPEC QL NAA+PROBE: NEGATIVE
HPV18 DNA SPEC QL NAA+PROBE: NEGATIVE
SPECIMEN DESCRIPTION: NORMAL
SPECIMEN SOURCE CVX/VAG CYTO: NORMAL

## 2018-05-25 ENCOUNTER — TELEPHONE (OUTPATIENT)
Dept: OBGYN | Facility: CLINIC | Age: 43
End: 2018-05-25

## 2018-05-25 NOTE — TELEPHONE ENCOUNTER
Pt calls and asks when she can be seen Dr Sen as schedule is booked far out.    Pt would like to see KT, not a different provider.      Is there a certain day you are okay with me adding this on?  Pt aware she wont hear back until the week after next.    Please original message too for documentation.    Marysol ORDAZ R.N.  [facility]

## 2018-05-25 NOTE — TELEPHONE ENCOUNTER
In order for insurance to cover colposcopy and EMB, it needs to be marked as high risk.  There needs to be explanation of why it needs to be done.  This just needs to be in the charting so that when it goes to billing it can be covered.      Marysol ORDAZ R.N.  [facility]

## 2018-05-29 ENCOUNTER — NURSE TRIAGE (OUTPATIENT)
Dept: NURSING | Facility: CLINIC | Age: 43
End: 2018-05-29

## 2018-05-29 NOTE — TELEPHONE ENCOUNTER
Pt called with a Sign language  Interpretor and wanted to speak to  - Dr Camacho's nurse about scheduling . FNA offered to send a message but Pt prefers to call back after 8am instead and declines further discussion.  .Gisela Lindo RN [place] nurse advisors.

## 2018-06-14 ENCOUNTER — OFFICE VISIT (OUTPATIENT)
Dept: OBGYN | Facility: CLINIC | Age: 43
End: 2018-06-14
Payer: MEDICARE

## 2018-06-14 VITALS — WEIGHT: 131.6 LBS | BODY MASS INDEX: 22.59 KG/M2 | DIASTOLIC BLOOD PRESSURE: 66 MMHG | SYSTOLIC BLOOD PRESSURE: 112 MMHG

## 2018-06-14 DIAGNOSIS — R87.619 ABNORMAL GLANDULAR PAPANICOLAOU SMEAR OF CERVIX: Primary | ICD-10-CM

## 2018-06-14 DIAGNOSIS — Z98.890 S/P LEEP OF CERVIX: ICD-10-CM

## 2018-06-14 PROCEDURE — 88305 TISSUE EXAM BY PATHOLOGIST: CPT | Mod: 26 | Performed by: FAMILY MEDICINE

## 2018-06-14 PROCEDURE — 57454 BX/CURETT OF CERVIX W/SCOPE: CPT | Performed by: FAMILY MEDICINE

## 2018-06-14 PROCEDURE — 88305 TISSUE EXAM BY PATHOLOGIST: CPT | Performed by: FAMILY MEDICINE

## 2018-06-14 PROCEDURE — 58110 BX DONE W/COLPOSCOPY ADD-ON: CPT | Performed by: FAMILY MEDICINE

## 2018-06-14 NOTE — NURSING NOTE
"Chief Complaint   Patient presents with     Colposcopy     and EMB     initial /66  Wt 131 lb 9.6 oz (59.7 kg)  LMP 05/20/2018 (Approximate)  BMI 22.59 kg/m2 Estimated body mass index is 22.59 kg/(m^2) as calculated from the following:    Height as of 5/15/18: 5' 4\" (1.626 m).    Weight as of this encounter: 131 lb 9.6 oz (59.7 kg).  BP completed using cuff size regular.  Jennifer Littlejohn CMA    "

## 2018-06-14 NOTE — MR AVS SNAPSHOT
"              After Visit Summary   6/14/2018    Sammie Ramirez    MRN: [id number]           Patient Information     Date Of Birth          1975        Visit Information        Provider Department      6/14/2018 1:00 PM Krystin Sen DO; [facility]        Today's Diagnoses     Abnormal glandular Papanicolaou smear of cervix    -  1    S/P LEEP of cervix          Care Instructions    Will call with result   Will also need to schedule ultrasound       Dr. Krystin Sen DO    Obstetrics and Gynecology  [facility] and [place]                 Follow-ups after your visit        Future tests that were ordered for you today     Open Future Orders        Priority Expected Expires Ordered    US Pelvic Complete with Transvaginal Routine 6/14/2018 12/11/2018 6/14/2018            Who to contact     If you have questions or need follow up information about today's clinic visit or your schedule please contact [facility] directly at [phone].  Normal or non-critical lab and imaging results will be communicated to you by MyChart, letter or phone within 4 business days after the clinic has received the results. If you do not hear from us within 7 days, please contact the clinic through [employer] or phone. If you have a critical or abnormal lab result, we will notify you by phone as soon as possible.  Submit refill requests through [email or web address] or call your pharmacy and they will forward the refill request to us. Please allow 3 business days for your refill to be completed.          Additional Information About Your Visit        [employer]inder [employer]SeekPanda Information     [email or web address] lets you send messages to your doctor, view your test results, renew your prescriptions, schedule appointments and more. To sign up, go to [email or web address] . Click on \"Log in\" on the left side of the screen, which will take you to the Welcome page. Then click on \"Sign up Now\" on the right side of the " page.     You will be asked to enter the access code listed below, as well as some personal information. Please follow the directions to create your username and password.     Your access code is: [email or web address]  Expires: 2018  2:00 PM     Your access code will  in 90 days. If you need help or a new code, please call your [place] clinic or [phone].        Care EveryWhere ID     This is your Care EveryWhere ID. This could be used by other organizations to access your [place] medical records  [id number]        Your Vitals Were     Last Period BMI (Body Mass Index)                2018 (Approximate) 22.59 kg/m2           Blood Pressure from Last 3 Encounters:   18 112/66   05/15/18 100/62   10/11/17 110/60    Weight from Last 3 Encounters:   18 131 lb 9.6 oz (59.7 kg)   05/15/18 133 lb 11.2 oz (60.6 kg)   10/11/17 134 lb (60.8 kg)              We Performed the Following     COLP VAGINA W CERVIX IF PRES W BIOPSY     ENDOMETRIAL BIOPSY W/O CERVICAL DILATION        Primary Care Provider Office Phone # Fax #    Walter Wesley [phone] [address]  [facility] [address]        Equal Access to Services     MARCEL BETANCOURT : Hadii aad ku hadasho Soomaali, waaxda luqadaha, qaybta kaalmada adeegyada, waxay idiin hayaan remi fermin . So [facility] [phone].    ATENCIÓN: Si habla español, tiene a carlos disposición servicios gratuitos de asistencia lingüística. Llame al [phone].    We comply with applicable federal civil rights laws and [place] laws. We do not discriminate on the basis of race, color, national origin, age, disability, sex, sexual orientation, or gender identity.            Thank you!     Thank you for choosing [facility]  for your care. Our goal is always to provide you with excellent care. Hearing back from our patients is one way we can continue to improve our services. Please take a few minutes to complete the written  survey that you may receive in the mail after your visit with us. Thank you!             Your Updated Medication List - Protect others around you: Learn how to safely use, store and throw away your medicines at www.disposemymeds.org.          This list is accurate as of 6/14/18  1:29 PM.  Always use your most recent med list.                   Brand Name Dispense Instructions for use Diagnosis    fexofenadine 180 MG tablet    ALLEGRA    90 tablet    Take 1 tablet (180 mg) by mouth daily        ibuprofen 800 MG tablet    ADVIL/MOTRIN    90 tablet    Take 1 tablet (800 mg) by mouth every 8 hours as needed for pain    Migraine without status migrainosus, not intractable, unspecified migraine type       mometasone 50 MCG/ACT spray    NASONEX    1 Box    Spray 2 sprays into both nostrils daily    Seasonal allergic rhinitis, unspecified allergic rhinitis trigger       ondansetron 4 MG ODT tab    ZOFRAN ODT    20 tablet    Take 1 tablet (4 mg) by mouth every 6 hours as needed for nausea    Migraine without status migrainosus, not intractable, unspecified migraine type       SUMAtriptan 50 MG tablet    IMITREX    9 tablet    Take 1 tablet (50 mg) by mouth at onset of headache for migraine May repeat dose in 2 hours.  Do not exceed 200 mg in 24 hours    Migraine without status migrainosus, not intractable, unspecified migraine type       triamcinolone 0.1 % cream    KENALOG    30 g    Apply sparingly to affected area three times daily for 14 days.    Dermatitis

## 2018-06-14 NOTE — PATIENT INSTRUCTIONS
Will call with result   Will also need to schedule ultrasound       Dr. Krystin Sen, DO    Obstetrics and Gynecology  [facility] and [place]

## 2018-06-18 LAB — COPATH REPORT: NORMAL

## 2018-06-22 ENCOUNTER — NURSE TRIAGE (OUTPATIENT)
Dept: NURSING | Facility: CLINIC | Age: 43
End: 2018-06-22

## 2018-06-25 ENCOUNTER — TELEPHONE (OUTPATIENT)
Dept: OBGYN | Facility: CLINIC | Age: 43
End: 2018-06-25

## 2018-06-25 DIAGNOSIS — N72 CERVICITIS: Primary | ICD-10-CM

## 2018-06-25 NOTE — LETTER
6/26/2018     Sammie Ramirez  [address] [facility] [address]      To whom it may concern,     The patient has having a medical concern and is excused from work   6/26/19.       Sincerely,         [place] [address]  [facility] [address]  Phone: [phone]

## 2018-06-25 NOTE — TELEPHONE ENCOUNTER
Patient calling regarding colposcopy results.  Results reviewed via .  Please send antibiotic  (mentioned in note) to pharmacy selected.  Patient will  tomorrow afternoon.  Nina Sanchez RN

## 2018-06-26 RX ORDER — AZITHROMYCIN 500 MG/1
1000 TABLET, FILM COATED ORAL ONCE
Qty: 4 TABLET | Refills: 0 | Status: SHIPPED | OUTPATIENT
Start: 2018-06-26 | End: 2018-06-26

## 2018-06-26 NOTE — TELEPHONE ENCOUNTER
Patient is calling checking the status of the antibiotic, she is hoping it can get sent right away. Patient also has a questions regarding the results, please call her at [phone].  Orin Woodard

## 2018-06-26 NOTE — TELEPHONE ENCOUNTER
Discussed with patient   rx for antibiotic called in   Recommend repeat pap smear in 1 year     Dr. Krystin Sen, DO    Obstetrics and Gynecology  [facility] and [place]

## 2019-01-02 ENCOUNTER — TELEPHONE (OUTPATIENT)
Dept: OBGYN | Facility: CLINIC | Age: 44
End: 2019-01-02

## 2019-01-02 DIAGNOSIS — B37.31 YEAST VAGINITIS: Primary | ICD-10-CM

## 2019-01-02 RX ORDER — FLUCONAZOLE 150 MG/1
150 TABLET ORAL ONCE
Qty: 1 TABLET | Refills: 0 | Status: SHIPPED | OUTPATIENT
Start: 2019-01-02 | End: 2023-11-22

## 2019-01-02 NOTE — TELEPHONE ENCOUNTER
Pt calling with . Has been having yeast infection symptoms since Sunday. Took OTC monistat but the medication did not help her symptoms. States she is having itching, but no abnormal discharge. Has had yeast infections in the past and says the symptoms are the same. She would like rx [facility] [address] in [place]. Please advise.      Bertha Pineda RN

## 2019-01-02 NOTE — TELEPHONE ENCOUNTER
rx sent please notify patient    Dr. Krystin Sen, DO    Obstetrics and Gynecology  [facility] and [place]

## 2019-01-22 ENCOUNTER — NURSE TRIAGE (OUTPATIENT)
Dept: NURSING | Facility: CLINIC | Age: 44
End: 2019-01-22

## 2019-01-22 ENCOUNTER — OFFICE VISIT (OUTPATIENT)
Dept: FAMILY MEDICINE | Facility: CLINIC | Age: 44
End: 2019-01-22
Payer: MEDICARE

## 2019-01-22 VITALS
DIASTOLIC BLOOD PRESSURE: 66 MMHG | OXYGEN SATURATION: 98 % | HEART RATE: 66 BPM | HEIGHT: 64 IN | TEMPERATURE: 98.4 F | WEIGHT: 127 LBS | RESPIRATION RATE: 16 BRPM | SYSTOLIC BLOOD PRESSURE: 106 MMHG | BODY MASS INDEX: 21.68 KG/M2

## 2019-01-22 DIAGNOSIS — J06.9 VIRAL URI: Primary | ICD-10-CM

## 2019-01-22 DIAGNOSIS — J30.2 SEASONAL ALLERGIC RHINITIS, UNSPECIFIED TRIGGER: ICD-10-CM

## 2019-01-22 PROCEDURE — 99213 OFFICE O/P EST LOW 20 MIN: CPT | Performed by: NURSE PRACTITIONER

## 2019-01-22 RX ORDER — MOMETASONE FUROATE MONOHYDRATE 50 UG/1
2 SPRAY, METERED NASAL DAILY
Qty: 1 BOX | Refills: 11 | Status: SHIPPED | OUTPATIENT
Start: 2019-01-22 | End: 2019-05-01

## 2019-01-22 RX ORDER — ONDANSETRON 4 MG/1
4 TABLET, ORALLY DISINTEGRATING ORAL EVERY 6 HOURS PRN
Qty: 20 TABLET | Refills: 0 | Status: CANCELLED | OUTPATIENT
Start: 2019-01-22

## 2019-01-22 ASSESSMENT — MIFFLIN-ST. JEOR: SCORE: 1216.07

## 2019-01-22 NOTE — TELEPHONE ENCOUNTER
Sammie has a fever and is really hot.  Last Friday started with a runny nose and feels it is a sinus infection.  Fever is going away and coming back.  Sammie has not taken temperature.  Sammie is hydrated.      Reason for Disposition    Fever present > 3 days (72 hours)    Additional Information    Negative: Shock suspected (e.g., cold/pale/clammy skin, too weak to stand, low BP, rapid pulse)    Negative: Difficult to awaken or acting confused  (e.g., disoriented, slurred speech)    Negative: [1] Difficulty breathing AND [2] bluish lips, tongue or face    Negative: New onset rash with multiple purple (or blood-colored) spots or dots    Negative: Sounds like a life-threatening emergency to the triager    Negative: [1] Headache AND [2] stiff neck (can't touch chin to chest)    Negative: Difficulty breathing    Negative: IV drug abuse    Negative: [1] Drinking very little AND [2] dehydration suspected (e.g., no urine > 12 hours, very dry mouth, very lightheaded)    Negative: Patient sounds very sick or weak to the triager  (Exception: mild weakness and hasn't taken fever medicine)    Negative: Fever > 104 F (40 C)    Negative: [1] Fever > 101 F (38.3 C) AND [2] age > 60    Negative: [1] Fever > 101 F (38.3 C) AND [2] bedridden (e.g., nursing home patient, CVA, chronic illness, recovering from surgery)    Negative: [1] Fever > 100.5 F (38.1 C) AND [2] indwelling urinary catheter (e.g., Henning, [place])    Negative: [1] Fever > 100.5 F (38.1 C) AND [2] has port (portacath), central line, or PICC line    Negative: [1] Fever > 100.5 F (38.1 C) AND [2] diabetes mellitus or weak immune system (e.g., HIV positive, splenectomy, chronic steroids)    Negative: [1] Fever > 100.5 F (38.1 C) AND [2] surgery in the last month    Negative: Transplant patient (e.g., kidney, liver, lung, heart)    Protocols used: FEVER-ADULT-

## 2019-01-22 NOTE — PROGRESS NOTES
SUBJECTIVE:   Sammie Ramirez is a 43 year old female who presents to clinic today for the following health issues:      Acute Illness   Acute illness concerns: sinus infection  Onset: x 4 days    Fever: YES    Chills/Sweats: YES- hot mostly    Headache (location?): no     Sinus Pressure:YES    Conjunctivitis:  no    Ear Pain: YES    Rhinorrhea: no     Congestion: YES    Sore Throat: YES- on and off, feels more swollen more uncomfortable     Cough: YES-non-productive    Wheeze: no     Decreased Appetite: no not tasting anything    Nausea: YES- yesterday    Vomiting: no     Diarrhea:  no     Dysuria/Freq.: no     Fatigue/Achiness: YES    Sick/Strep Exposure: YES- son was sick last week on Monday     Therapies Tried and outcome: lycine, ibuprofen 800mg helpful  Patient need a DrGilberto Note for work.    Felt a little better yesterday and feeling worse today.  Sinus pressure was most bothersome on the L side, but today the R side feels is worse.  Needs nasonex refilled.        Problem list and histories reviewed & adjusted, as indicated.  Additional history: as documented    Current Outpatient Medications   Medication Sig Dispense Refill     ibuprofen (ADVIL/MOTRIN) 800 MG tablet Take 1 tablet (800 mg) by mouth every 8 hours as needed for pain 90 tablet 3     mometasone (NASONEX) 50 MCG/ACT spray Spray 2 sprays into both nostrils daily 1 Box 11     ondansetron (ZOFRAN ODT) 4 MG ODT tab Take 1 tablet (4 mg) by mouth every 6 hours as needed for nausea 20 tablet 0     SUMAtriptan (IMITREX) 50 MG tablet Take 1 tablet (50 mg) by mouth at onset of headache for migraine May repeat dose in 2 hours.  Do not exceed 200 mg in 24 hours 9 tablet 5     triamcinolone (KENALOG) 0.1 % cream Apply sparingly to affected area three times daily for 14 days. 30 g 3     fexofenadine (ALLEGRA) 180 MG tablet Take 1 tablet (180 mg) by mouth daily 90 tablet 0     Allergies   Allergen Reactions     Amoxicillin      Rash, hives     Codeine Sulfate       "Lactose      Seasonal Allergies        Reviewed and updated as needed this visit by clinical staff       Reviewed and updated as needed this visit by Provider         ROS:  Constitutional, HEENT, cardiovascular, pulmonary, gi and gu systems are negative, except as otherwise noted.    OBJECTIVE:     /66 (BP Location: Right arm, Patient Position: Sitting, Cuff Size: Adult Regular)   Pulse 66   Temp 98.4  F (36.9  C) (Oral)   Resp 16   Ht 1.626 m (5' 4\")   Wt 57.6 kg (127 lb)   SpO2 98%   BMI 21.80 kg/m    Body mass index is 21.8 kg/m .  GENERAL: healthy, alert and no distress  EYES: Eyes grossly normal to inspection and conjunctivae and sclerae normal  HENT: ear canals and TM's normal, nose and mouth without ulcers or lesions, nasal mucosa congested and erythematous, erythema in posterior OP, MMM  NECK: no adenopathy, no asymmetry, masses, or scars and thyroid normal to palpation  RESP: lungs clear to auscultation - no rales, rhonchi or wheezes, normal effort  CV: regular rate and rhythm, normal S1 S2, no S3 or S4, no murmur, click or rub  SKIN: no suspicious lesions or rashes    Diagnostic Test Results:  none     ASSESSMENT/PLAN:   1. Seasonal allergic rhinitis, unspecified trigger  Refilled; may help with congetion.   - mometasone (NASONEX) 50 MCG/ACT nasal spray; Spray 2 sprays into both nostrils daily  Dispense: 1 Box; Refill: 11    2. Viral URI  No indication for antibiotics today.  Continue supportive cares; rest, fluids, lozenges, nasal rinse, steamy bathroom.  She reports unpleasant side effects to sudafed.  Can use OTC expectorant.  Will let me know if no improvement in 1 week.            JORGE Chan [facility]    "

## 2019-01-22 NOTE — PATIENT INSTRUCTIONS

## 2019-01-22 NOTE — LETTER
[address], [address]  [facility] [address]  Phone: [phone]  Fax: [phone]    January 22, 2019        Sammie Ramirez  [facility] [address] [address]          To whom it may concern:    RE: Sammie MARIA TERESA James    Patient was seen and treated today at our clinic and missed work on 1/22/2019.        Sincerely,        JORGE Chan CNP

## 2019-06-21 ENCOUNTER — TELEPHONE (OUTPATIENT)
Dept: OBGYN | Facility: CLINIC | Age: 44
End: 2019-06-21

## 2019-06-21 DIAGNOSIS — Z98.890 S/P LEEP OF CERVIX: ICD-10-CM

## 2019-06-21 NOTE — TELEPHONE ENCOUNTER
Pt is past due for f/u pap smear.  [facility] clinic and schedule with calling service.    Indiana Gruber  Pap Tracking

## 2019-08-13 DIAGNOSIS — G43.909 MIGRAINE WITHOUT STATUS MIGRAINOSUS, NOT INTRACTABLE, UNSPECIFIED MIGRAINE TYPE: ICD-10-CM

## 2019-08-13 NOTE — TELEPHONE ENCOUNTER
"Requested Prescriptions   Pending Prescriptions Disp Refills     SUMAtriptan (IMITREX) 50 MG tablet [Pharmacy Med Name: SUMATRIPTAN SUCC 50 MG TABLET]  Last Written Prescription Date:  10/11/2017  Last Fill Quantity: 9 tablet,  # refills: 5   Last office visit: 10/11/2017 with prescribing provider: Lupillo  Future Office Visit:     9 tablet 5     Sig: TAKE 1 TABLET BY MOUTH AT ONSET OF HEADACHE. MAY REPEAT DOSE IN 2HRS.DO NOT EXCEED 4TABS IN 24HRS       Serotonin Agonists Failed - 8/13/2019  5:46 PM        Failed - Serotonin Agonist request needs review.     Please review patient's record. If patient has had 8 or more treatments in the past month, please forward to provider.          Failed - Recent (12 mo) or future (30 days) visit within the authorizing provider's specialty     Patient had office visit in the last 12 months or has a visit in the next 30 days with authorizing provider or within the authorizing provider's specialty.  See \"Patient Info\" tab in inbasket, or \"Choose Columns\" in Meds & Orders section of the refill encounter.              Passed - Blood pressure under 140/90 in past 12 months     BP Readings from Last 3 Encounters:   01/22/19 106/66   06/14/18 112/66   05/15/18 100/62                 Passed - Medication is active on med list        Passed - Patient is age 18 or older        Passed - No active pregnancy on record        Passed - No positive pregnancy test in past 12 months          "

## 2019-08-14 NOTE — TELEPHONE ENCOUNTER
LM via video relay to call back and schedule an appt as she has not seen provider since 2017    Mary Beth Izquierdo RN, BSN

## 2019-08-15 DIAGNOSIS — L30.9 DERMATITIS: ICD-10-CM

## 2019-08-15 DIAGNOSIS — G43.909 MIGRAINE WITHOUT STATUS MIGRAINOSUS, NOT INTRACTABLE, UNSPECIFIED MIGRAINE TYPE: ICD-10-CM

## 2019-08-16 RX ORDER — IBUPROFEN 800 MG/1
800 TABLET, FILM COATED ORAL EVERY 8 HOURS PRN
Qty: 90 TABLET | Refills: 3 | OUTPATIENT
Start: 2019-08-16

## 2019-08-16 RX ORDER — TRIAMCINOLONE ACETONIDE 1 MG/G
CREAM TOPICAL
Qty: 30 G | Refills: 3 | OUTPATIENT
Start: 2019-08-16

## 2019-08-16 NOTE — TELEPHONE ENCOUNTER
"Requested Prescriptions   Pending Prescriptions Disp Refills     ibuprofen (ADVIL/MOTRIN) 800 MG tablet  Last Written Prescription Date:  5/15/2018  Last Fill Quantity: 90 tab,  # refills: 3   Last Office Visit: 1/22/2019 [facility]  Future Office Visit:      90 tablet 3     Sig: Take 1 tablet (800 mg) by mouth every 8 hours as needed for pain       NSAID Medications Failed - 8/15/2019  6:40 PM        Failed - Normal ALT on file in past 12 months     No lab results found.          Failed - Normal AST on file in past 12 months     No lab results found.          Failed - Recent (12 mo) or future (30 days) visit within the authorizing provider's specialty     Patient had office visit in the last 12 months or has a visit in the next 30 days with authorizing provider or within the authorizing provider's specialty.  See \"Patient Info\" tab in inbasket, or \"Choose Columns\" in Meds & Orders section of the refill encounter.              Failed - Normal CBC on file in past 12 months     No lab results found.              Failed - Normal serum creatinine on file in past 12 months     No lab results found.          Passed - Blood pressure under 140/90 in past 12 months     BP Readings from Last 3 Encounters:   01/22/19 106/66   06/14/18 112/66   05/15/18 100/62                 Passed - Patient is age 6-64 years        Passed - Medication is active on med list        Passed - No active pregnancy on record        Passed - No positive pregnancy test in past 12 months                  triamcinolone (KENALOG) 0.1 % external cream    Last Written Prescription Date:  5/15/2018  Last Fill Quantity: 30g,  # refills: 3   Last Office Visit: 1/22/2019  [facility]  Future Office Visit:      30 g 3     Sig: Apply sparingly to affected area three times daily for 14 days.       Topical Steroids and Nonsteroidals Protocol Failed - 8/15/2019  6:40 PM        Failed - Recent (12 mo) or future (30 days) visit within the authorizing provider's " "specialty     Patient had office visit in the last 12 months or has a visit in the next 30 days with authorizing provider or within the authorizing provider's specialty.  See \"Patient Info\" tab in inbasket, or \"Choose Columns\" in Meds & Orders section of the refill encounter.              Passed - Patient is age 6 or older        Passed - Authorizing prescriber's most recent note related to this medication read.     If refill request is for ophthalmic use, please forward request to provider for approval.          Passed - High potency steroid not ordered        Passed - Medication is active on med list        "

## 2019-08-19 RX ORDER — SUMATRIPTAN 50 MG/1
TABLET, FILM COATED ORAL
Qty: 9 TABLET | Refills: 5 | Status: SHIPPED | OUTPATIENT
Start: 2019-08-19 | End: 2019-10-15

## 2019-09-04 NOTE — PATIENT INSTRUCTIONS
Schedule an appointment to complete your labs. They have Saturday appointments.      Dr. Krystin Sen, DO    OB/GYN   [facility] [phone]  [facility] [phone]       [FreeTextEntry1] : 24 yo female with c/o left sided abdominal pain, h/o ovarian cyst that ruptured. patient reports llq pain sharp pain, 7/10 pain scale, lasts for minutes. Pain occurs few times a month. not sure if improved with bm and flatus. food does not make it feel beeter, improved with rest. patient reports  multiple bms, small amounts at a time.no brpbr, no melena. no n/v.  gerd with certain food. symptoms for 2 months\par \par patient due for CPE\par \par no family h/o colon or gastric cancer.

## 2019-09-25 DIAGNOSIS — G43.909 MIGRAINE WITHOUT STATUS MIGRAINOSUS, NOT INTRACTABLE, UNSPECIFIED MIGRAINE TYPE: ICD-10-CM

## 2019-09-25 NOTE — TELEPHONE ENCOUNTER
"Requested Prescriptions   Pending Prescriptions Disp Refills     ibuprofen (ADVIL/MOTRIN) 800 MG tablet 90 tablet 3     Sig: Take 1 tablet (800 mg) by mouth every 8 hours as needed for pain     Last Written Prescription Date:  05/15/2018  Last Fill Quantity: 90 tablet,  # refills: 3   Last office visit: 1/22/2019 with prescribing provider:  10/11/2017   Future Office Visit:   Next 5 appointments (look out 90 days)    Oct 15, 2019 10:20 AM CDT  [facility] [address] [facility] [address] [phone]               NSAID Medications Failed - 9/25/2019 10:52 AM        Failed - Normal ALT on file in past 12 months     No lab results found.          Failed - Normal AST on file in past 12 months     No lab results found.          Failed - Normal CBC on file in past 12 months     No lab results found.              Failed - Normal serum creatinine on file in past 12 months     No lab results found.          Passed - Blood pressure under 140/90 in past 12 months     BP Readings from Last 3 Encounters:   01/22/19 106/66   06/14/18 112/66   05/15/18 100/62                 Passed - Recent (12 mo) or future (30 days) visit within the authorizing provider's specialty     Patient had office visit in the last 12 months or has a visit in the next 30 days with authorizing provider or within the authorizing provider's specialty.  See \"Patient Info\" tab in inbasket, or \"Choose Columns\" in Meds & Orders section of the refill encounter.              Passed - Patient is age 6-64 years        Passed - Medication is active on med list        Passed - No active pregnancy on record        Passed - No positive pregnancy test in past 12 months          Mian CAINT  "

## 2019-09-25 NOTE — TELEPHONE ENCOUNTER
Routing refill request to provider for review/approval because:  Labs not current:  ALT,AST, CR, CBC  Mary Beth Izquierdo RN, BSN

## 2019-09-26 RX ORDER — IBUPROFEN 800 MG/1
800 TABLET, FILM COATED ORAL EVERY 8 HOURS PRN
Qty: 90 TABLET | Refills: 3 | Status: SHIPPED | OUTPATIENT
Start: 2019-09-26 | End: 2020-11-02

## 2019-10-15 ENCOUNTER — OFFICE VISIT (OUTPATIENT)
Dept: FAMILY MEDICINE | Facility: CLINIC | Age: 44
End: 2019-10-15
Payer: MEDICARE

## 2019-10-15 VITALS
SYSTOLIC BLOOD PRESSURE: 110 MMHG | BODY MASS INDEX: 21.51 KG/M2 | HEART RATE: 59 BPM | HEIGHT: 64 IN | RESPIRATION RATE: 16 BRPM | DIASTOLIC BLOOD PRESSURE: 62 MMHG | OXYGEN SATURATION: 97 % | WEIGHT: 126 LBS

## 2019-10-15 DIAGNOSIS — G43.909 MIGRAINE WITHOUT STATUS MIGRAINOSUS, NOT INTRACTABLE, UNSPECIFIED MIGRAINE TYPE: Primary | ICD-10-CM

## 2019-10-15 DIAGNOSIS — R51.9 SINUS HEADACHE: ICD-10-CM

## 2019-10-15 PROCEDURE — T1013 SIGN LANG/ORAL INTERPRETER: HCPCS | Mod: U3 | Performed by: FAMILY MEDICINE

## 2019-10-15 PROCEDURE — 99214 OFFICE O/P EST MOD 30 MIN: CPT | Performed by: FAMILY MEDICINE

## 2019-10-15 RX ORDER — SUMATRIPTAN 50 MG/1
TABLET, FILM COATED ORAL
Qty: 9 TABLET | Refills: 5 | Status: SHIPPED | OUTPATIENT
Start: 2019-10-15 | End: 2020-05-11

## 2019-10-15 RX ORDER — MOMETASONE FUROATE MONOHYDRATE 50 UG/1
2 SPRAY, METERED NASAL DAILY
Qty: 1 BOX | Refills: 5 | Status: SHIPPED | OUTPATIENT
Start: 2019-10-15 | End: 2019-10-17

## 2019-10-15 RX ORDER — SUMATRIPTAN 50 MG/1
TABLET, FILM COATED ORAL
Qty: 9 TABLET | Refills: 5 | Status: SHIPPED | OUTPATIENT
Start: 2019-10-15 | End: 2019-10-15

## 2019-10-15 RX ORDER — MOMETASONE FUROATE MONOHYDRATE 50 UG/1
2 SPRAY, METERED NASAL DAILY
Qty: 1 BOX | Refills: 5 | Status: SHIPPED | OUTPATIENT
Start: 2019-10-15 | End: 2019-10-15

## 2019-10-15 RX ORDER — FEXOFENADINE HCL AND PSEUDOEPHEDRINE HCL 180; 240 MG/1; MG/1
1 TABLET, EXTENDED RELEASE ORAL DAILY
Qty: 30 TABLET | Refills: 1 | Status: SHIPPED | OUTPATIENT
Start: 2019-10-15 | End: 2020-02-10

## 2019-10-15 ASSESSMENT — MIFFLIN-ST. JEOR: SCORE: 1211.53

## 2019-10-15 NOTE — PROGRESS NOTES
Subjective     Sammie Ramirez is a 43 year old female who presents to clinic with the  today for the following health issues:    HPI     Patient has had a headache for the past month. She describes the headache as a pressure located at her frontal and maxillary sinuses. Patient also has clear nasal drainage with occasional sneezing. The pain is different from her typical migraines. She has tried ibuprofen, tylenol, and Zyrtec without improvement. Denies fever, bloody nasal drainage, cough, nausea, and vomiting.     History of seasonal allergic rhinitis. She would like a refill of the Nasonex.     History of migraine headaches, occurring once per month. She continues to use the Imitrex as needed for abortive therapy.    Patient Active Problem List   Diagnosis     Sensorineural hearing loss, bilateral     SND (sensorineural deafness)     Seasonal allergic rhinitis     S/P LEEP of cervix     Migraine headache     Past Surgical History:   Procedure Laterality Date     LEEP TX, CERVICAL  x 2 per chart    historical       Social History     Tobacco Use     Smoking status: Never Smoker     Smokeless tobacco: Never Used   Substance Use Topics     Alcohol use: Yes     Comment: wine     Family History   Problem Relation Age of Onset     Family History Negative Mother      Family History Negative Father          Reviewed and updated as needed this visit by Provider       Review of Systems   ROS COMP: CONSTITUTIONAL: NEGATIVE for fever  ENT/MOUTH: as noted above   RESP: NEGATIVE for significant cough  GI: NEGATIVE for nausea or vomiting  NEURO: as noted above     This document serves as a record of the services and decisions personally performed and made by Walter Wesley MD. It was created on his behalf by Yojana Ponce, a trained medical scribe. The creation of this document is based on the provider's statements to the medical scribe.  Yojana Ponce 10:25 AM October 15, 2019      Objective    /62 (BP  "Location: Right arm, Patient Position: Chair, Cuff Size: Adult Regular)   Pulse 59   Resp 16   Ht 1.626 m (5' 4\")   Wt 57.2 kg (126 lb)   SpO2 97%   BMI 21.63 kg/m    Body mass index is 21.63 kg/m .  Physical Exam   GENERAL: healthy, alert and no distress  EYES: Eyes grossly normal to inspection, PERRL and conjunctivae and sclerae normal  HENT: ear canals and TM's normal, nose inflamed with yellow drainage, sinuses nontender, mouth without ulcers or lesions  RESP: lungs clear to auscultation - no rales, rhonchi or wheezes  CV: regular rate and rhythm, normal S1 S2, no S3 or S4, no murmur, click or rub, no peripheral edema and peripheral pulses strong  NEURO: Normal strength and tone, sensory exam grossly normal, mentation intact and cranial nerves 2-12 intact, finger to nose normal     Diagnostic Test Results:  Labs reviewed in Epic      Assessment & Plan     1. Migraine without status migrainosus, not intractable, unspecified migraine type  Continue Imitrex as needed.  - SUMAtriptan (IMITREX) 50 MG tablet; TAKE 1 TABLET BY MOUTH AT ONSET OF HEADACHE. MAY REPEAT DOSE IN 2HRS.DO NOT EXCEED 4TABS IN 24HRS  Dispense: 9 tablet; Refill: 5    2. Sinus headache  Headaches appear to be sinus in nature likely from allergy issues.  Trial of Allegra-D, Nasonex or other nasal steroid as covered by insurance.  If not better in 2 to 3 weeks she will send a message and may consider antibiotics.  - mometasone (NASONEX) 50 MCG/ACT nasal spray; Spray 2 sprays into both nostrils daily  Dispense: 1 Box; Refill: 5  - fexofenadine-pseudoePHEDrine (ALLEGRA-D 24) 180-240 MG 24 hr tablet; Take 1 tablet by mouth daily  Dispense: 30 tablet; Refill: 1     Return in about 1 month (around 11/15/2019) for Pap with OB/GYN.    The information in this document, created by the medical scribe for me, accurately reflects the services I personally performed and the decisions made by me. I have reviewed and approved this document for accuracy prior " to leaving the patient care area.  October 15, 2019 10:45 AM    Walter Wesley MD  [facility]

## 2019-10-17 ENCOUNTER — TELEPHONE (OUTPATIENT)
Dept: FAMILY MEDICINE | Facility: CLINIC | Age: 44
End: 2019-10-17

## 2019-10-17 DIAGNOSIS — R51.9 SINUS HEADACHE: ICD-10-CM

## 2019-10-17 RX ORDER — FLUTICASONE PROPIONATE 50 MCG
1 SPRAY, SUSPENSION (ML) NASAL DAILY
Qty: 9.9 ML | Refills: 5 | Status: SHIPPED | OUTPATIENT
Start: 2019-10-17 | End: 2020-02-10 | Stop reason: ALTCHOICE

## 2019-10-30 ENCOUNTER — TELEPHONE (OUTPATIENT)
Dept: FAMILY MEDICINE | Facility: CLINIC | Age: 44
End: 2019-10-30

## 2019-10-30 DIAGNOSIS — J01.00 ACUTE NON-RECURRENT MAXILLARY SINUSITIS: Primary | ICD-10-CM

## 2019-10-30 DIAGNOSIS — G43.909 MIGRAINE WITHOUT STATUS MIGRAINOSUS, NOT INTRACTABLE, UNSPECIFIED MIGRAINE TYPE: ICD-10-CM

## 2019-10-30 NOTE — TELEPHONE ENCOUNTER
Pt calling via video relay  stating that she got a bad headache yesterday and took the imitrex last and then took another pill 2 hours later.  She vomited and this medication didn't help with her migraine.    She is still having a headache today and has been taking the Allegra-D but this is not helping either.  She states that she was to call back if not getting better to get an antibiotic and a different migraine medication.    Pt was advise to try ibuprofen with food and if headache gets worse to be seen in [facility].    Please advise    Mary Beth Izquierdo RN, BSN

## 2019-10-31 NOTE — TELEPHONE ENCOUNTER
Pt calling again regarding her medication.  Provider is gone for the day and messages have been sent to PCP.  Pt was advised to be seen in UC if headaches are that bad and thinks it's a sinus infection.    TC relayed message to patient    Mary Beth Izquierdo RN, BSN

## 2019-10-31 NOTE — TELEPHONE ENCOUNTER
Patient called again checking the status of the request, please review and advise.  Orin Woodard

## 2019-11-01 RX ORDER — AZITHROMYCIN 250 MG/1
TABLET, FILM COATED ORAL
Qty: 6 TABLET | Refills: 0 | Status: SHIPPED | OUTPATIENT
Start: 2019-11-01 | End: 2020-02-10

## 2019-11-01 RX ORDER — ONDANSETRON 4 MG/1
4 TABLET, ORALLY DISINTEGRATING ORAL EVERY 6 HOURS PRN
Qty: 10 TABLET | Refills: 0 | Status: SHIPPED | OUTPATIENT
Start: 2019-11-01 | End: 2021-03-04

## 2019-11-01 NOTE — TELEPHONE ENCOUNTER
Pt informed of message below via [place] relay and advised that it can take 48-72 hours for antibiotic to start working.  If headache is not better then she should follow up    Mary Beth Izquierdo RN, BSN

## 2019-11-01 NOTE — TELEPHONE ENCOUNTER
See my prior note - will treat for sinus infection.  Sent azithromycin.  Assuming prior pharmacy as this was not listed.    Zofran for nausea.      If imitrex has worked multiple times in the past then that is still a good medication for migraine for her just need to treat the underlying trigger (sinus infection likely) - can take again when it has been 24 hours since her 1st dose for this headache.  Can use ibuprofen as well.

## 2019-11-15 DIAGNOSIS — L30.9 DERMATITIS: ICD-10-CM

## 2019-11-15 NOTE — LETTER
[facility]  [address], [address]  [place] [address]  [phone]        November 18, 2019    Sammie Ramirez  [address] [facility] [address]            Dear Ms. Sammie Ramirez:      You are due for a pap smear test and appointment with  Dr Sen.  Please call to set this up at your earliest convenience.  If you have any further questions or problems, please contact our office.    Sincerely,      Krystin Sen, DO

## 2019-11-18 NOTE — TELEPHONE ENCOUNTER
Due for appt and f/u pap.  LM with  Service as pt is hearing imparied.  Sent letter as well.    Marysol ORDAZ R.N.  [facility]

## 2019-11-22 NOTE — TELEPHONE ENCOUNTER
Pt called, and was advised to make an appt for her annual exam.   Once the appt is scheduled, we can authorize refills.    Sakshi LANTIGUA RN

## 2019-12-02 RX ORDER — TRIAMCINOLONE ACETONIDE 1 MG/G
CREAM TOPICAL
Qty: 30 G | Refills: 3 | OUTPATIENT
Start: 2019-12-02

## 2019-12-10 ENCOUNTER — TELEPHONE (OUTPATIENT)
Dept: FAMILY MEDICINE | Facility: CLINIC | Age: 44
End: 2019-12-10

## 2019-12-10 NOTE — TELEPHONE ENCOUNTER
Pt calling via video relay  regarding her mometasone.  She states she has been using the flonase but it has a bad taste and doesn't work as well so she wants to get the mometasone instead.  Informed pt that her patient insurance won't cover the mometasone so that is why the flonase was sent in.  She is going to call the insurance to see if they will cover the medication.    Mary Beth Izquierdo RN, BSN

## 2020-01-02 ENCOUNTER — TELEPHONE (OUTPATIENT)
Dept: FAMILY MEDICINE | Facility: CLINIC | Age: 45
End: 2020-01-02

## 2020-01-02 DIAGNOSIS — I83.90 ASYMPTOMATIC VARICOSE VEINS OF LOWER EXTREMITY, UNSPECIFIED LATERALITY: Primary | ICD-10-CM

## 2020-01-02 DIAGNOSIS — M25.569 KNEE PAIN, UNSPECIFIED CHRONICITY, UNSPECIFIED LATERALITY: ICD-10-CM
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
## 2020-01-07 ENCOUNTER — TELEPHONE (OUTPATIENT)
Dept: OTHER | Facility: CLINIC | Age: 45
End: 2020-01-07

## 2020-01-07 NOTE — TELEPHONE ENCOUNTER
LM via [place] [facility] to call back and get referral info.    Mary Beth Izquierdo RN, BSN      [facility] is referring you to the Orthopedic  Services at [facility].       The  Representative will assist you in the coordination of your Orthopedic and Musculoskeletal Care as prescribed by your physician.    The  Representative will call you within 1 business day to help schedule your appointment, or you may contact the  Representative at:    All areas ~ [phone]    Vascular referral for [facility]: [phone]    Mary Beth Izquierdo RN, BSN

## 2020-01-07 NOTE — TELEPHONE ENCOUNTER
"Referral received via EPIC \"Work Que\", per  guidelines referral forwarded to [facility] for varicose veins    Flora CRAINN, RN    [facility]  Office: [phone]  Fax: [phone]      "

## 2020-01-08 ENCOUNTER — TELEPHONE (OUTPATIENT)
Dept: FAMILY MEDICINE | Facility: CLINIC | Age: 45
End: 2020-01-08

## 2020-01-08 NOTE — TELEPHONE ENCOUNTER
"Pt calling to request \"cream\" for sore on lip     She states she has received RX for this in past but does not know from who or who long ago.     Advise as no DX of cold sore and no record of being given RX for this needs OV.  Can do E visit for this.     Pt stated \"this is ridiculous\" and hung up phone.     Marysol Moralez, RN    "

## 2020-01-15 ENCOUNTER — OFFICE VISIT (OUTPATIENT)
Dept: ORTHOPEDICS | Facility: CLINIC | Age: 45
End: 2020-01-15
Payer: MEDICARE

## 2020-01-15 ENCOUNTER — ANCILLARY PROCEDURE (OUTPATIENT)
Dept: GENERAL RADIOLOGY | Facility: CLINIC | Age: 45
End: 2020-01-15
Attending: FAMILY MEDICINE
Payer: MEDICARE

## 2020-01-15 VITALS — HEIGHT: 64 IN | SYSTOLIC BLOOD PRESSURE: 118 MMHG | DIASTOLIC BLOOD PRESSURE: 68 MMHG | BODY MASS INDEX: 21.63 KG/M2

## 2020-01-15 DIAGNOSIS — M17.31 POST-TRAUMATIC OSTEOARTHRITIS OF RIGHT KNEE: Primary | ICD-10-CM

## 2020-01-15 DIAGNOSIS — M25.561 ACUTE PAIN OF RIGHT KNEE: ICD-10-CM

## 2020-01-15 DIAGNOSIS — M25.561 RIGHT KNEE PAIN: ICD-10-CM

## 2020-01-15 PROCEDURE — 73562 X-RAY EXAM OF KNEE 3: CPT | Performed by: FAMILY MEDICINE

## 2020-01-15 PROCEDURE — 20611 DRAIN/INJ JOINT/BURSA W/US: CPT | Mod: RT | Performed by: FAMILY MEDICINE

## 2020-01-15 PROCEDURE — T1013 SIGN LANG/ORAL INTERPRETER: HCPCS | Mod: U3 | Performed by: FAMILY MEDICINE

## 2020-01-15 PROCEDURE — 99204 OFFICE O/P NEW MOD 45 MIN: CPT | Mod: 25 | Performed by: FAMILY MEDICINE

## 2020-01-15 RX ORDER — METHYLPREDNISOLONE ACETATE 40 MG/ML
40 INJECTION, SUSPENSION INTRA-ARTICULAR; INTRALESIONAL; INTRAMUSCULAR; SOFT TISSUE
Status: DISCONTINUED | OUTPATIENT
Start: 2020-01-15 | End: 2020-02-10

## 2020-01-15 RX ORDER — MELOXICAM 15 MG/1
15 TABLET ORAL DAILY
Qty: 30 TABLET | Refills: 1 | Status: SHIPPED | OUTPATIENT
Start: 2020-01-15 | End: 2020-05-06

## 2020-01-15 RX ADMIN — METHYLPREDNISOLONE ACETATE 40 MG: 40 INJECTION, SUSPENSION INTRA-ARTICULAR; INTRALESIONAL; INTRAMUSCULAR; SOFT TISSUE at 11:57
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
## 2020-01-15 NOTE — PATIENT INSTRUCTIONS
1. Acute pain of right knee    2. Post-traumatic osteoarthritis of right knee      -Patient has right knee pain due to arthritis under her knee cap  -Patient tolerated cortisone injection today without complications.  Patient was given postprocedure instructions  -Patient will start Meloxicam 15mg today as needed  -Patient will start physical therapy and home exercise program  -Patient was given compression knee sleeve to be worn while at work  -Patient will follow up when pain returns.  -Call direct clinic number [[phone]] at any time with questions or concerns.    Albert Yeo MD [facility]  [facility]
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
## 2020-01-17 ENCOUNTER — TELEPHONE (OUTPATIENT)
Dept: ORTHOPEDICS | Facility: CLINIC | Age: 45
End: 2020-01-17

## 2020-01-17 NOTE — TELEPHONE ENCOUNTER
Per Dr. Yeo he had explained to patient that Meloxicam may cause nausea, not a cortisone injection. Patient to contact PCP for recommendations.     Phone call to patient via ASL relay service. Informed that there must have been a miscommunication and informed of the above. She asks again if she should pickup the Meloxicam. Advised against that right now while she is having nausea. She asks if she can take Ibuprofen 800mg. Advised against that as well as it can upset her stomach. Suggested Tylenol for now until nausea improves or cortisone injection starts working. She verbalized understanding.     MALLIKA Espinoza RN

## 2020-01-17 NOTE — TELEPHONE ENCOUNTER
Sammie Ramirez is a 44 year old female who left a voicemail stating she saw Dr. Yeo this week and has questions for a nurse. She is still having nausea that is lingering after the injection. She would like a call back as soon as possible.     Patient expecting call back: YES      Preferred contact number:  [phone] (home)    Can we leave a detailed message on this number: NO consent on file.     Left voicemail asking patient to return call. Patient is hearing impaired and message goes through video relay.     MALLIKA Espinoza RN

## 2020-01-17 NOTE — TELEPHONE ENCOUNTER
Patient calls to check status of previous call. Informed that Dr. Yeo has been seeing patients and checks messages in between. She needs to leave the house and states if she is not home, to leave a message. She asks if nausea continues over the weekend, should she go to urgent care. Informed if it is severe, she may, otherwise can check with PCP on 1/20/20 if it continues. She verbalized understanding.     MALLIKA Espinoza RN

## 2020-01-17 NOTE — TELEPHONE ENCOUNTER
Patient returns call with . She states she was told the nausea would get better and it hasn't. Asks if that is normal. Informed that it was not a common reaction as far as writer was aware. She states she gets the nausea 2-3 x a day, feels almost like being car or sea sick. Denies fever, chills, or being ill. Denies any other family members being ill. Knee was a little sore yesterday, but feels better today.   She has NOT picked up the Meloxicam yet. Asks if she should. Asked that she hold on that for now as that can tend to upset her stomach and if she is already nauseated, they would not be advised.   Will discuss with provider and get back with her.     Please advise.     MALLIKA Espinoza RN

## 2020-01-29 ENCOUNTER — THERAPY VISIT (OUTPATIENT)
Dept: PHYSICAL THERAPY | Facility: CLINIC | Age: 45
End: 2020-01-29
Attending: FAMILY MEDICINE
Payer: MEDICARE

## 2020-01-29 DIAGNOSIS — M17.31 POST-TRAUMATIC OSTEOARTHRITIS OF RIGHT KNEE: ICD-10-CM

## 2020-01-29 DIAGNOSIS — M25.561 RIGHT KNEE PAIN: ICD-10-CM

## 2020-01-29 PROCEDURE — 97161 PT EVAL LOW COMPLEX 20 MIN: CPT | Mod: GP | Performed by: PHYSICAL THERAPIST

## 2020-01-29 PROCEDURE — 97110 THERAPEUTIC EXERCISES: CPT | Mod: GP | Performed by: PHYSICAL THERAPIST

## 2020-01-29 ASSESSMENT — ACTIVITIES OF DAILY LIVING (ADL)
RISE FROM A CHAIR: ACTIVITY IS NOT DIFFICULT
HOW_WOULD_YOU_RATE_THE_CURRENT_FUNCTION_OF_YOUR_KNEE_DURING_YOUR_USUAL_DAILY_ACTIVITIES_ON_A_SCALE_FROM_0_TO_100_WITH_100_BEING_YOUR_LEVEL_OF_KNEE_FUNCTION_PRIOR_TO_YOUR_INJURY_AND_0_BEING_THE_INABILITY_TO_PERFORM_ANY_OF_YOUR_USUAL_DAILY_ACTIVITIES?: 1
GO UP STAIRS: ACTIVITY IS NOT DIFFICULT
WEAKNESS: I DO NOT HAVE THE SYMPTOM
HOW_WOULD_YOU_RATE_THE_OVERALL_FUNCTION_OF_YOUR_KNEE_DURING_YOUR_USUAL_DAILY_ACTIVITIES?: NORMAL
STIFFNESS: I DO NOT HAVE THE SYMPTOM
PAIN: I HAVE THE SYMPTOM BUT IT DOES NOT AFFECT MY ACTIVITY
KNEE_ACTIVITY_OF_DAILY_LIVING_SUM: 69
GIVING WAY, BUCKLING OR SHIFTING OF KNEE: I DO NOT HAVE THE SYMPTOM
SQUAT: ACTIVITY IS NOT DIFFICULT
SWELLING: I DO NOT HAVE THE SYMPTOM
KNEEL ON THE FRONT OF YOUR KNEE: ACTIVITY IS NOT DIFFICULT
LIMPING: I DO NOT HAVE THE SYMPTOM
GO DOWN STAIRS: ACTIVITY IS NOT DIFFICULT
WALK: ACTIVITY IS NOT DIFFICULT
AS_A_RESULT_OF_YOUR_KNEE_INJURY,_HOW_WOULD_YOU_RATE_YOUR_CURRENT_LEVEL_OF_DAILY_ACTIVITY?: NEARLY NORMAL
STAND: ACTIVITY IS NOT DIFFICULT
KNEE_ACTIVITY_OF_DAILY_LIVING_SCORE: 98.57
RAW_SCORE: 69
SIT WITH YOUR KNEE BENT: ACTIVITY IS NOT DIFFICULT

## 2020-01-29 NOTE — PROGRESS NOTES
[facility] Initial Evaluation  Subjective:       Pertinent medical history includes:  Diabetes, heart problems and migraines/headaches.                    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              Knee Activity of Daily Living Score: 98.57            Objective:  System    Physical Exam    General     ROS    Assessment/Plan:
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
## 2020-02-05 PROBLEM — M25.561 RIGHT KNEE PAIN: Status: RESOLVED | Noted: 2020-01-29 | Resolved: 2020-02-05

## 2020-02-10 ENCOUNTER — OFFICE VISIT (OUTPATIENT)
Dept: OBGYN | Facility: CLINIC | Age: 45
End: 2020-02-10
Payer: MEDICARE

## 2020-02-10 ENCOUNTER — RESULT FOLLOW UP (OUTPATIENT)
Dept: OBGYN | Facility: CLINIC | Age: 45
End: 2020-02-10

## 2020-02-10 VITALS
DIASTOLIC BLOOD PRESSURE: 68 MMHG | SYSTOLIC BLOOD PRESSURE: 126 MMHG | HEIGHT: 64 IN | WEIGHT: 126.8 LBS | BODY MASS INDEX: 21.65 KG/M2

## 2020-02-10 DIAGNOSIS — E87.6 HYPOKALEMIA: ICD-10-CM

## 2020-02-10 DIAGNOSIS — Z01.419 ENCOUNTER FOR GYNECOLOGICAL EXAMINATION (GENERAL) (ROUTINE) WITHOUT ABNORMAL FINDINGS: ICD-10-CM

## 2020-02-10 DIAGNOSIS — Z00.00 ENCOUNTER FOR PREVENTATIVE ADULT HEALTH CARE EXAMINATION: Primary | ICD-10-CM

## 2020-02-10 LAB
ALBUMIN SERPL-MCNC: 3.6 G/DL (ref 3.4–5)
ALP SERPL-CCNC: 33 U/L (ref 40–150)
ALT SERPL W P-5'-P-CCNC: 15 U/L (ref 0–50)
ANION GAP SERPL CALCULATED.3IONS-SCNC: 3 MMOL/L (ref 3–14)
AST SERPL W P-5'-P-CCNC: 13 U/L (ref 0–45)
BILIRUB SERPL-MCNC: 1 MG/DL (ref 0.2–1.3)
BUN SERPL-MCNC: 10 MG/DL (ref 7–30)
CALCIUM SERPL-MCNC: 8.2 MG/DL (ref 8.5–10.1)
CHLORIDE SERPL-SCNC: 108 MMOL/L (ref 94–109)
CHOLEST SERPL-MCNC: 167 MG/DL
CO2 SERPL-SCNC: 28 MMOL/L (ref 20–32)
CREAT SERPL-MCNC: 0.72 MG/DL (ref 0.52–1.04)
GFR SERPL CREATININE-BSD FRML MDRD: >90 ML/MIN/{1.73_M2}
GLUCOSE SERPL-MCNC: 97 MG/DL (ref 70–99)
HDLC SERPL-MCNC: 69 MG/DL
LDLC SERPL CALC-MCNC: 77 MG/DL
NONHDLC SERPL-MCNC: 98 MG/DL
POTASSIUM SERPL-SCNC: 3.1 MMOL/L (ref 3.4–5.3)
PROT SERPL-MCNC: 7 G/DL (ref 6.8–8.8)
SODIUM SERPL-SCNC: 139 MMOL/L (ref 133–144)
TRIGL SERPL-MCNC: 103 MG/DL
TSH SERPL DL<=0.005 MIU/L-ACNC: 1.56 MU/L (ref 0.4–4)

## 2020-02-10 PROCEDURE — G0476 HPV COMBO ASSAY CA SCREEN: HCPCS | Performed by: FAMILY MEDICINE

## 2020-02-10 PROCEDURE — 80053 COMPREHEN METABOLIC PANEL: CPT | Performed by: FAMILY MEDICINE

## 2020-02-10 PROCEDURE — 87624 HPV HI-RISK TYP POOLED RSLT: CPT | Performed by: FAMILY MEDICINE

## 2020-02-10 PROCEDURE — 84443 ASSAY THYROID STIM HORMONE: CPT | Performed by: FAMILY MEDICINE

## 2020-02-10 PROCEDURE — 80061 LIPID PANEL: CPT | Performed by: FAMILY MEDICINE

## 2020-02-10 PROCEDURE — G0145 SCR C/V CYTO,THINLAYER,RESCR: HCPCS | Performed by: FAMILY MEDICINE

## 2020-02-10 PROCEDURE — 36415 COLL VENOUS BLD VENIPUNCTURE: CPT | Performed by: FAMILY MEDICINE

## 2020-02-10 PROCEDURE — 99396 PREV VISIT EST AGE 40-64: CPT | Performed by: FAMILY MEDICINE

## 2020-02-10 PROCEDURE — T1013 SIGN LANG/ORAL INTERPRETER: HCPCS | Mod: U3 | Performed by: FAMILY MEDICINE

## 2020-02-10 RX ORDER — FLUTICASONE PROPIONATE 50 MCG
SPRAY, SUSPENSION (ML) NASAL
Refills: 5 | COMMUNITY
Start: 2019-11-22 | End: 2020-10-07 | Stop reason: ALTCHOICE

## 2020-02-10 RX ORDER — AZITHROMYCIN 250 MG/1
TABLET, FILM COATED ORAL
Refills: 0 | COMMUNITY
Start: 2019-11-01 | End: 2020-02-10

## 2020-02-10 RX ORDER — OMEPRAZOLE 40 MG/1
40 CAPSULE, DELAYED RELEASE ORAL DAILY
Qty: 90 CAPSULE | Refills: 3 | Status: SHIPPED | OUTPATIENT
Start: 2020-02-10 | End: 2021-12-01

## 2020-02-10 ASSESSMENT — MIFFLIN-ST. JEOR: SCORE: 1214.13

## 2020-02-10 NOTE — LETTER
February 17, 2020    Sammie Ramirez  [address] [facility] [address]    Dear MsGilbertoJames,  This letter is regarding your recent Pap smear (cervical cancer screening) and Human Papillomavirus (HPV) test.  We are happy to inform you that your Pap smear result is normal. Cervical cancer is closely linked with certain types of HPV. Your results showed no evidence of high-risk HPV.  We recommend you have your next PAP smear and HPV test in 1 year.  You will still need to return to the clinic every year for an annual exam and other preventive tests.  If you have additional questions regarding this result, please call our registered nurse, Ioana at [phone].  Sincerely,    Krystin Sen DO/gayathri

## 2020-02-10 NOTE — PROGRESS NOTES
SUBJECTIVE:  Sammie Ramirez is an 44 year old  woman who presents for   annual gyn exam. No LMP recorded. Periods are regular q 28-30 days, lasting   6 days. Dysmenorrhea:moderate, occurring premenstrually and first 1-2 days of flow. Cyclic symptoms   include none. No intermenstrual bleeding,   spotting, or discharge.  Menarche age none .  STD hx: none.    Current contraception: periodic abstinence  BINA exposure: no  History of abnormal Pap smear: No  Family history of uterine or ovarian cancer: No  Regular self breast exam: Yes  History of abnormal mammogram: No  Family history of breast cancer: No  History of abnormal lipids: No    Past Medical History:   Diagnosis Date     ASCUS (atypical squamous cells of undetermined significance) on Pap smear 10/13, 2016    Neg HPV     LSIL (low grade squamous intraepithelial lesion) on Pap smear 2014        Family History   Problem Relation Age of Onset     Family History Negative Mother      Family History Negative Father        Past Surgical History:   Procedure Laterality Date     LEEP TX, CERVICAL  x 2 per chart    historical       Current Outpatient Medications   Medication     azithromycin (ZITHROMAX) 250 MG tablet     fexofenadine (ALLEGRA) 180 MG tablet     fexofenadine-pseudoePHEDrine (ALLEGRA-D 24) 180-240 MG 24 hr tablet     fluticasone (FLONASE) 50 MCG/ACT nasal spray     ibuprofen (ADVIL/MOTRIN) 800 MG tablet     meloxicam (MOBIC) 15 MG tablet     ondansetron (ZOFRAN ODT) 4 MG ODT tab     order for DME     SUMAtriptan (IMITREX) 50 MG tablet     triamcinolone (KENALOG) 0.1 % cream     Current Facility-Administered Medications   Medication     methylPREDNISolone (DEPO-MEDROL) injection 40 mg     Allergies   Allergen Reactions     Amoxicillin      Rash, hives     Codeine Sulfate      Lactose      Seasonal Allergies        Social History     Tobacco Use     Smoking status: Never Smoker     Smokeless tobacco: Never Used   Substance Use Topics     Alcohol use:  Yes     Comment: wine       Review Of Systems  Ears/Nose/Throat: negative  Respiratory: No shortness of breath, dyspnea on exertion, cough, or hemoptysis  Cardiovascular: negative  Gastrointestinal: negative  Genitourinary: negative  Constitutional, HEENT, cardiovascular, pulmonary, GI, , musculoskeletal, neuro, skin, endocrine and psych systems are negative, except as otherwise noted.      OBJECTIVE:  There were no vitals taken for this visit.    General appearance: healthy, alert and no distress  Skin: Skin color, texture, turgor normal. No rashes or lesions.  Ears: negative  Nose/Sinuses: Nares normal. Septum midline. Mucosa normal. No drainage or sinus tenderness.  Oropharynx: Lips, mucosa, and tongue normal. Teeth and gums normal.  Neck: Neck supple. No adenopathy. Thyroid symmetric, normal size,, Carotids without bruits.  Lungs: negative, Percussion normal. Good diaphragmatic excursion. Lungs clear  Heart: negative, PMI normal. No lifts, heaves, or thrills. RRR. No murmurs, clicks gallops or rub  Breasts: Inspection negative. No nipple discharge or bleeding. No masses.  Abdomen: Abdomen soft, non-tender. BS normal. No masses, organomegaly  Pelvic: Pelvic:  Pelvic examination with no pap/ Gonorrhea and Chlamydia   including  External genitalia normal   and vagina normal rugatted not atrophic  Examination of urethra  normal no masses, tenderness, scarring  bladder, no masses or tenderness  Cervix no lesions or discharge  Bimanual exam with   Uterus 8 weeks size, mid position, mobile,non-tenderness, no descent   Adnexa/parametria  No masses          ASSESSMENT:  Sammie Ramirez is an 44 year old  woman who presents for   annual gyn exam. Concerns:  Nausesa, on omeprazole    PLAN:  Dx:  1)  Pap smear  2)  Mammography, lipids at appropriate intervals  3) hx of ulcers, nausea: rx omeprazole 40 mg, also to see family practice if not better in 1-2 weeks        PE:  Reviewed health maintenance including diet,  regular exercise   and periodic exams.    Dr. Krystin Sen, DO    Obstetrics and Gynecology  [facility] [place] [facility] [place]

## 2020-02-11 ENCOUNTER — TELEPHONE (OUTPATIENT)
Dept: OBGYN | Facility: CLINIC | Age: 45
End: 2020-02-11

## 2020-02-11 NOTE — TELEPHONE ENCOUNTER
Patient's  just called for patient's results. Please dial Sammie's number today and it goes to  services.

## 2020-02-13 LAB
COPATH REPORT: NORMAL
PAP: NORMAL

## 2020-02-17 NOTE — PROGRESS NOTES
2/14/12: LSIL  10/3/12: NIL.  Plan pap in 1 yr.  10/2013 ASCUS neg HPV.  Plan pap in 1 yr. Per [facility] algorithms, needs two negative cotests at 12 & 24 months after LEEP before going to cotest in 3 yrs.  10/2014: NIL pap, neg HPV. Plan cotest pap & HPV in 1 year. Tracking started.  2/9/2016 Ascus Neg HPV, plan: cotest one year per MD. Tracking.   05/01/17: NIL pap, Neg HR HPV result. Plan cotest in 1 year per provider.  5/15/18 Atypical Endocervical cells, Neg HPV. Plan colp and endometrial sampling per guidelines.   06/14/18: [place] bx Suggestive of LSIL ROBERT-1 and Cervicitis. ECC Neg for dyplasia. EMB neg for dysplasia. Plan cotest in 1 year.   07/19/19 Patient is lost to pap tracking follow-up.   2/10/20 NIL pap, Neg HPV. Plan  cotest in 1 year.  (MRA)  2/17/20 Result letter sent per RN request (rlm)

## 2020-03-09 DIAGNOSIS — E87.6 HYPOKALEMIA: ICD-10-CM

## 2020-03-09 LAB — POTASSIUM SERPL-SCNC: 3.7 MMOL/L (ref 3.4–5.3)

## 2020-03-09 PROCEDURE — 36415 COLL VENOUS BLD VENIPUNCTURE: CPT | Performed by: FAMILY MEDICINE

## 2020-03-09 PROCEDURE — 84132 ASSAY OF SERUM POTASSIUM: CPT | Performed by: FAMILY MEDICINE

## 2020-03-12 DIAGNOSIS — M17.31 POST-TRAUMATIC OSTEOARTHRITIS OF RIGHT KNEE: ICD-10-CM

## 2020-03-18 ENCOUNTER — TELEPHONE (OUTPATIENT)
Dept: OBGYN | Facility: CLINIC | Age: 45
End: 2020-03-18

## 2020-03-18 DIAGNOSIS — K64.4 EXTERNAL HEMORRHOIDS: Primary | ICD-10-CM

## 2020-03-18 NOTE — TELEPHONE ENCOUNTER
Patient calling via .  Has a hemorrhoid that just popped up last night.  Worse one she has ever had.  Very uncomfortable. Did try tub bath and OTC cream.  Preparation H.  Denies constipation.  Had a soft stool last night.  Requesting other medication.  Not huge, but painful.  Rec:  Sitz baths as needed.  Ice to area. Elevate feet. Rest, avoid constipation.  Hydrate.      Please advise.  Nina Sanchez RN

## 2020-03-18 NOTE — TELEPHONE ENCOUNTER
Patient called back. She does not need a refill.    Please deny.    States pain returned after the cortisone injection. She requested to be seen in office. Made an appointment for her to be seen Monday April 27th at 8am.     Tami Chiang MS, ATC

## 2020-03-18 NOTE — TELEPHONE ENCOUNTER
Please advise rx called in for her.   Also agree with your wonderful advise!     Dr. Krystin Sen, DO    Obstetrics and Gynecology  [facility]

## 2020-03-19 RX ORDER — MELOXICAM 15 MG/1
TABLET ORAL
Qty: 30 TABLET | Refills: 1 | OUTPATIENT
Start: 2020-03-19

## 2020-04-22 ENCOUNTER — TELEPHONE (OUTPATIENT)
Dept: ORTHOPEDICS | Facility: CLINIC | Age: 45
End: 2020-04-22

## 2020-04-22 NOTE — TELEPHONE ENCOUNTER
Patient is currently scheduled with Dr. Yeo on 4/27/20.  Dr. Yeo is not in clinic on this date.  He would like to defer her to the following week when he is in clinic.     LVM thru ASL interpreting service on her voicemail to call back to r/s for the following week.    Chelsy Rodriguez MS ATC

## 2020-04-24 NOTE — TELEPHONE ENCOUNTER
2nd attempt to reach patient to reschedule appointment. Busy signal - unable to LVM.    Genevieve Zapata, ATC

## 2020-04-25 NOTE — TELEPHONE ENCOUNTER
Upon chart review, patient rescheduled appointment for 5/6/20.    Closing encounter.    Genevieve Zapata ATC

## 2020-05-01 ENCOUNTER — VIRTUAL VISIT (OUTPATIENT)
Dept: FAMILY MEDICINE | Facility: CLINIC | Age: 45
End: 2020-05-01
Payer: MEDICARE

## 2020-05-01 DIAGNOSIS — G43.809 OTHER MIGRAINE WITHOUT STATUS MIGRAINOSUS, NOT INTRACTABLE: Primary | ICD-10-CM

## 2020-05-01 PROCEDURE — 99442 ZZC PHYSICIAN TELEPHONE EVALUATION 11-20 MIN: CPT | Performed by: FAMILY MEDICINE

## 2020-05-01 ASSESSMENT — ENCOUNTER SYMPTOMS
PHOTOPHOBIA: 0
HEADACHES: 1
VOMITING: 0
NAUSEA: 1

## 2020-05-01 NOTE — LETTER
[facility]  [id number] [place] [id number]  Phone: [phone]    05/01/20    Sammie Ramirez  [facility] [address] [address]      To whom it may concern:     Recommend off work on 4- and 5-1-2020 due to medical illness, please contact us for additional formation.    Sincerely,      Chevy Mesa MD

## 2020-05-01 NOTE — PROGRESS NOTES
"Sammie Ramirez is a 44 year old female who is being evaluated via a billable telephone visit.      The patient has been notified of following:     \"This telephone visit will be conducted via a call between you and your physician/provider. We have found that certain health care needs can be provided without the need for a physical exam.  This service lets us provide the care you need with a short phone conversation.  If a prescription is necessary we can send it directly to your pharmacy.  If lab work is needed we can place an order for that and you can then stop by our lab to have the test done at a later time.    Telephone visits are billed at different rates depending on your insurance coverage. During this emergency period, for some insurers they may be billed the same as an in-person visit.  Please reach out to your insurance provider with any questions.    If during the course of the call the physician/provider feels a telephone visit is not appropriate, you will not be charged for this service.\"    Patient has given verbal consent for Telephone visit?  Yes    How would you like to obtain your AVS? Mail a copy    Subjective     Sammie Ramirez is a 44 year old female who presents to clinic today for the following health issues:    HPI    ASL interpretor/Phone    Patient is a pleasant 44-year-old female who is having a telephone visit regarding headaches, history of migraines.  She reports that she had a migraine attack started last night, throbbing in character started at the back of her neck and radiate to her forehead and temple regions.  Believes she took the Imitrex too late and did not have improvement, she subsequently took a Tylenol PM and went to sleep.  Had associated symptoms of nausea without emesis. No associated auras.  Denies any photophobia or phonophobia.  This is not the worst headache of her life.   However she does rated as an 8 out of 10.  This morning, she reports symptoms overall better she " feels quite fatigued and worn out from dealing with a migraine and is requesting a work note for yesterday and today.  Declined any refills.    Reviewed and updated as needed this visit by Provider         Review of Systems   Eyes: Negative for photophobia.   Gastrointestinal: Positive for nausea. Negative for vomiting.   Neurological: Positive for headaches.             Objective   Reported vitals:  There were no vitals taken for this visit.   Unable to do physical exam as encounter was performed through an .      Diagnostic Test Results:  Labs reviewed in Epic        Assessment/Plan:  1. Other migraine without status migrainosus, not intractable  Improving.  Continue current medical management using Imitrex, Tylenol/ ibuprofen as needed.  Advised that if symptoms do not improve, she may recommend subsequent evaluation in clinic.  Discussed with patient that if symptoms progressively get worse or if she is having more migraines, she may discuss with her PCP to see if neurological imaging including CT brain is appropriate.  Work note given.      Return in about 3 days (around 5/4/2020) for If symptoms do not improve or gets worse..       Phone call duration: 11 minutes    Chevy Mesa MD

## 2020-05-05 ENCOUNTER — TELEPHONE (OUTPATIENT)
Dept: FAMILY MEDICINE | Facility: CLINIC | Age: 45
End: 2020-05-05

## 2020-05-05 NOTE — TELEPHONE ENCOUNTER
LM for call back    Pt needs in clinic visit please help to schedule this.       Advised that if symptoms do not improve, she may recommend subsequent evaluation in clinic.  Discussed with patient that if symptoms progressively get worse or if she is having more migraines, she may discuss with her PCP to see if neurological imaging including CT brain is appropriate.  Work note given.    Marysol Moralez RN

## 2020-05-05 NOTE — TELEPHONE ENCOUNTER
05/05/2020    Pt had a virtual visit with Dr Mesa on 05/01 regarding migraines. Pt stated her symptoms have not improved and was told to call back if they had not done so in 3 days. Please call patient to advise.     [phone]    Julia Carreon, Patient  Representative

## 2020-05-06 ENCOUNTER — OFFICE VISIT (OUTPATIENT)
Dept: FAMILY MEDICINE | Facility: CLINIC | Age: 45
End: 2020-05-06
Payer: MEDICARE

## 2020-05-06 ENCOUNTER — TELEPHONE (OUTPATIENT)
Dept: ORTHOPEDICS | Facility: CLINIC | Age: 45
End: 2020-05-06

## 2020-05-06 ENCOUNTER — OFFICE VISIT (OUTPATIENT)
Dept: ORTHOPEDICS | Facility: CLINIC | Age: 45
End: 2020-05-06
Payer: MEDICARE

## 2020-05-06 VITALS
OXYGEN SATURATION: 100 % | HEART RATE: 72 BPM | SYSTOLIC BLOOD PRESSURE: 122 MMHG | WEIGHT: 125 LBS | BODY MASS INDEX: 21.29 KG/M2 | DIASTOLIC BLOOD PRESSURE: 64 MMHG | TEMPERATURE: 98.1 F

## 2020-05-06 VITALS
DIASTOLIC BLOOD PRESSURE: 84 MMHG | HEIGHT: 64 IN | SYSTOLIC BLOOD PRESSURE: 124 MMHG | BODY MASS INDEX: 21.34 KG/M2 | WEIGHT: 125 LBS

## 2020-05-06 DIAGNOSIS — R11.2 NON-INTRACTABLE VOMITING WITH NAUSEA, UNSPECIFIED VOMITING TYPE: ICD-10-CM

## 2020-05-06 DIAGNOSIS — M76.51 PATELLAR TENDINITIS OF RIGHT KNEE: ICD-10-CM

## 2020-05-06 DIAGNOSIS — R51.9 SINUS HEADACHE: Primary | ICD-10-CM

## 2020-05-06 DIAGNOSIS — M17.31 POST-TRAUMATIC OSTEOARTHRITIS OF RIGHT KNEE: Primary | ICD-10-CM

## 2020-05-06 PROCEDURE — 99214 OFFICE O/P EST MOD 30 MIN: CPT | Mod: 25 | Performed by: FAMILY MEDICINE

## 2020-05-06 PROCEDURE — 20611 DRAIN/INJ JOINT/BURSA W/US: CPT | Mod: RT | Performed by: FAMILY MEDICINE

## 2020-05-06 PROCEDURE — 99213 OFFICE O/P EST LOW 20 MIN: CPT | Performed by: FAMILY MEDICINE

## 2020-05-06 RX ORDER — TRIAMCINOLONE ACETONIDE 40 MG/ML
40 INJECTION, SUSPENSION INTRA-ARTICULAR; INTRAMUSCULAR
Status: DISCONTINUED | OUTPATIENT
Start: 2020-05-06 | End: 2020-05-11

## 2020-05-06 RX ORDER — AZITHROMYCIN 250 MG/1
TABLET, FILM COATED ORAL
Qty: 6 TABLET | Refills: 0 | Status: SHIPPED | OUTPATIENT
Start: 2020-05-06 | End: 2020-05-11

## 2020-05-06 RX ORDER — ONDANSETRON 4 MG/1
4 TABLET, FILM COATED ORAL EVERY 6 HOURS PRN
Qty: 12 TABLET | Refills: 0 | Status: SHIPPED | OUTPATIENT
Start: 2020-05-06 | End: 2021-03-05

## 2020-05-06 RX ORDER — DICLOFENAC SODIUM 75 MG/1
75 TABLET, DELAYED RELEASE ORAL 2 TIMES DAILY PRN
Qty: 60 TABLET | Refills: 1 | Status: SHIPPED | OUTPATIENT
Start: 2020-05-06 | End: 2020-05-06

## 2020-05-06 RX ORDER — ONDANSETRON 4 MG/1
4 TABLET, FILM COATED ORAL EVERY 6 HOURS PRN
Qty: 12 TABLET | Refills: 0 | Status: SHIPPED | OUTPATIENT
Start: 2020-05-06 | End: 2020-05-06

## 2020-05-06 RX ORDER — DICLOFENAC SODIUM 75 MG/1
75 TABLET, DELAYED RELEASE ORAL 2 TIMES DAILY PRN
Qty: 60 TABLET | Refills: 1 | Status: SHIPPED | OUTPATIENT
Start: 2020-05-06 | End: 2021-03-04

## 2020-05-06 RX ADMIN — TRIAMCINOLONE ACETONIDE 40 MG: 40 INJECTION, SUSPENSION INTRA-ARTICULAR; INTRAMUSCULAR at 14:16

## 2020-05-06 ASSESSMENT — MIFFLIN-ST. JEOR: SCORE: 1205.97

## 2020-05-06 NOTE — PROGRESS NOTES
ASSESSMENT & PLAN  Patient Instructions     1. Post-traumatic osteoarthritis of right knee    2. Patellar tendinitis of right knee      -Patient has right knee pain due to aggravation of arthritis and new onset tendinitis  -Patient tolerated cortisone injection today without complications.    -Patient will start formal physical therapy and home exercise program  -Patient will start Diclofenac 75mg twice a day as needed for pain.  Patient did not get any pain relief with Meloxicam from her last visit.  -Patient may start Zofran as needed for nausea s/p cortisone injection (patient developed 3 days of nausea after previous injection)  -Call direct clinic number [[phone]] at any time with questions or concerns.    Albert Yeo MD [facility]  [facility]            -----    SUBJECTIVE:  Sammie Ramirez is a 44 year old female who is seen in follow-up for right knee pain.They were last seen 1/15/20 and received a right knee intra articular cortisone injection which provided good relief lasting approximately 3 months.     Since their last visit reports that her schedule changed at work and she started working overnights about 1 month ago. She reports that increased, prolonged walking and standing increase pain. She notes pain has improved over the last 2 weeks because she has not been working. They indicate that their current pain level is 0/10 but pain at worst is 10/10. They have tried rest/activity avoidance, elevation, ES Tylenol, ibuprofen and other medications: meloxicam (did not provide relief).      The patient is seen with an .    Patient's past medical, surgical, social, and family histories were reviewed today and no changes are noted.    REVIEW OF SYSTEMS:  Constitutional: NEGATIVE for fever, chills, change in weight  Skin: NEGATIVE for worrisome rashes, moles or lesions  GI/: NEGATIVE for bowel or bladder changes  Neuro: NEGATIVE for  "weakness, dizziness or paresthesias    OBJECTIVE:  /84   Ht 1.632 m (5' 4.25\")   Wt 56.7 kg (125 lb)   LMP 05/05/2020 (Exact Date)   BMI 21.29 kg/m     General: healthy, alert and in no distress  HEENT: no scleral icterus or conjunctival erythema  Skin: no suspicious lesions or rash. No jaundice.  CV: regular rhythm by palpation, no pedal edema  Resp: normal respiratory effort without conversational dyspnea   Psych: normal mood and affect  Gait: normal steady gait with appropriate coordination and balance  Neuro: normal light touch sensory exam of the extremities.    MSK:  RIGHT KNEE  Inspection:    normal alignment, small scar over patella  Palpation:    Tender about the lateral patellar facet, medial patellar facet, lateral joint line and medial joint line; patellar tendon and insertion. Remainder of bony and ligamentous landmarks are nontender.    No effusion is present    Patellofemoral crepitus is Present  Range of Motion:     00 extension to 1350 flexion  Strength:    Quadriceps 5-/5    Extensor mechanism intact  Special Tests:    Positive: Patellar grind    Negative: MCL/valgus stress (0 & 30 deg), LCL/varus stress (0 & 30 deg), Lachman's, anterior drawer, posterior drawer, Russell's  Independent visualization of the below image:    Large Joint Injection/Arthocentesis: R knee joint    Date/Time: 5/6/2020 2:16 PM  Performed by: Yeo, Albert, MD  Authorized by: Yeo, Albert, MD     Indications:  Pain and osteoarthritis  Needle Size:  25 G  Guidance: ultrasound    Approach:  Superolateral  Location:  Knee      Medications:  40 mg triamcinolone 40 MG/ML  Outcome:  Tolerated well, no immediate complications  Procedure discussed: discussed risks, benefits, and alternatives    Consent Given by:  Patient  Timeout: timeout called immediately prior to procedure    Prep: patient was prepped and draped in usual sterile fashion            Patient's conditions were thoroughly discussed during today's visit with " greater than 50% of the visit spent counseling the patient with total time spent face-to-face with the patient being 20 minutes.    Albert Yeo MD, CAM  [facility]

## 2020-05-06 NOTE — PROGRESS NOTES
Subjective     Sammie Ramirez is a 44 year old female who presents to clinic today for the following health issues:    HPI   Headache  Onset: 04/30/2020    Description:   Location: bilateral in the frontal area   Character: sharp pain, haziness   Frequency:  Every day   Duration: intermittent     Intensity: moderate    Progression of Symptoms:  worsening    Accompanying Signs & Symptoms:  Stiff neck: no   Neck or upper back pain: no   Fever: no   Sinus pressure: YES- last week pt stated she was experiencing sinus pressure   Nausea or vomiting: YES- a little bit   Dizziness: no   Numbness: no   Weakness: no   Visual changes: no     History:   Head trauma: no   Family history of migraines: no   Previous tests for headaches: no   Neurologist evaluations: no   Able to do daily activities: YES- it depends   Wake with a headaches: YES  Do headaches wake you up: no   Daily pain medication use: YES- ibuprofen 800mg   Work/school stressors/changes: no    Precipitating factors:   Does light make it worse: no  Does sound make it worse: no     Alleviating factors:  Does sleep help: YES    Therapies Tried and outcome:  Ibuprofen (Advil, Motrin) and Imitrex with no relief. Pt states the Imitrex made the headache worse. She would prefer to keep Imitrex on her medication list until she speaks with her PCP about this medication. Her headache discomfort is in frontal area, like a heavy feeling.     No recent cough or fever. Last week she had some maxillary sinus pain. She has some sinus congestion at time of exam.   Patient Active Problem List   Diagnosis     Sensorineural hearing loss, bilateral     SND (sensorineural deafness)     Seasonal allergic rhinitis     S/P LEEP of cervix     Migraine headache     Past Surgical History:   Procedure Laterality Date     LEEP TX, CERVICAL  x 2 per chart    historical       Social History     Tobacco Use     Smoking status: Never Smoker     Smokeless tobacco: Never Used   Substance Use Topics      Alcohol use: Yes     Comment: wine     Family History   Problem Relation Age of Onset     Family History Negative Mother      Family History Negative Father          Current Outpatient Medications   Medication Sig Dispense Refill     azithromycin (ZITHROMAX) 250 MG tablet Take 2 tablets (500 mg) by mouth daily for 1 day, THEN 1 tablet (250 mg) daily for 4 days. 6 tablet 0     fexofenadine (ALLEGRA) 180 MG tablet Take 1 tablet (180 mg) by mouth daily 90 tablet 0     fluticasone (FLONASE) 50 MCG/ACT nasal spray INSTILL 1 SPRAY INTO BOTH NOSTRILS DAILY  5     hydrocortisone (ANUSOL-HC) 2.5 % cream Place rectally 2 times daily as needed for hemorrhoids 70 g 11     ibuprofen (ADVIL/MOTRIN) 800 MG tablet Take 1 tablet (800 mg) by mouth every 8 hours as needed for pain 90 tablet 3     meloxicam (MOBIC) 15 MG tablet Take 1 tablet (15 mg) by mouth daily 30 tablet 1     Omeprazole (PRILOSEC PO)        omeprazole (PRILOSEC) 40 MG DR capsule Take 1 capsule (40 mg) by mouth daily 90 capsule 3     ondansetron (ZOFRAN ODT) 4 MG ODT tab Take 1 tablet (4 mg) by mouth every 6 hours as needed for nausea 10 tablet 0     order for DME Small open patella knee brace  1 Device 0     SUMAtriptan (IMITREX) 50 MG tablet TAKE 1 TABLET BY MOUTH AT ONSET OF HEADACHE. MAY REPEAT DOSE IN 2HRS.DO NOT EXCEED 4TABS IN 24HRS 9 tablet 5     triamcinolone (KENALOG) 0.1 % cream Apply sparingly to affected area three times daily for 14 days. 30 g 3     Allergies   Allergen Reactions     Amoxicillin      Rash, hives     Codeine Sulfate      Lactose      Seasonal Allergies        Reviewed and updated as needed this visit by Provider         Review of Systems   ROS COMP: CONSTITUTIONAL: NEGATIVE for fever, chills, change in weight  ENT/MOUTH: NEGATIVE for ear, mouth and throat problems. She has some nasal congestion at time of exam.   RESP: NEGATIVE for significant cough or SOB  CV: NEGATIVE for chest pain, palpitations or peripheral edema  Neuro:  patient has frontal headache discomfort at exam, no complaint of dizziness, or cognitive concerns.       Objective    /64 (BP Location: Right arm, Patient Position: Chair, Cuff Size: Adult Regular)   Pulse 72   Temp 98.1  F (36.7  C) (Oral)   Wt 56.7 kg (125 lb)   LMP 05/05/2020 (Exact Date)   SpO2 100%   BMI 21.29 kg/m    Body mass index is 21.29 kg/m .  Physical Exam   Vital signs reviewed.  Patient is in no acute appearing distress.  Breathing appears nonlabored.  Patient is alert and oriented ×3.  ENT: Ear exam shows clear TMs without injection or dullness bilaterally.  Nasal exam shows increased bilateral turbinate injection and edema with tender maxillary sinuses.  No rhinorrhea noted.  Oral exam shows no pharyngeal injection or exudate.  Neck: Supple with no lymphadenopathy.  Heart: Heart rate is regular without murmur.  Lungs: Lungs are clear to auscultation with good airflow bilaterally.  Skin: Skin is warm and dry without any rash noted.    Patient was agreeable to me doing some osteopathic manipulative therapy for relief of her sinus congestion.  Treatment consisted of doing myofascial release in the nuchal region of skull, and the paracervical musculature using both soft tissue stretching, and counterstrain techniques.  After good relief of cervical muscle tension, cranial therapy was done using a C3 release combined with distraction and gentle distraction of nasal bone.  Patient noted decreased headache discomfort after these treatments.    Assessment & Plan     Sammie was seen today for headache.    Diagnoses and all orders for this visit:    Sinus headache  -     azithromycin (ZITHROMAX) 250 MG tablet; Take 2 tablets (500 mg) by mouth daily for 1 day, THEN 1 tablet (250 mg) daily for 4 days.           Patient Instructions   See hand out for advice also. Continue your current management other then the use of Imitrex, plus take the new antibiotic treatment. Try to find ways to relax your neck  muscles, to include doing what was shown today during exam with the manipulation.     Patient Education     Sinus Headache    The sinuses are air-filled spaces within the bones of the face. They connect to the inside of the nose. Sinusitis is an inflammation of the tissue lining the sinus cavity. Sinus inflammation can occur during a cold or hay fever (allergies to pollens and other particles in the air) and cause symptoms of sinus congestion and fullness and perhaps a low-grade fever. An infection is usually present when there is also facial pain or headache and green or yellow drainage from the nose or into the back of the throat (postnasal drip). Antibiotics are often prescribed to treat this condition.  Sinus headache may cause pain in different places, depending on which sinuses are infected. There may be pain in the temples, forehead, top of the head, behind or around the eye, across the cheekbone, or into the upper teeth.  You may find that changing your position, sitting upright or lying down, will bring some relief.  Home care  The following guidelines will help you care for yourself at home:    Drink plenty of water, hot tea, and other liquids to stay well hydrated. This thins the mucus and helps your sinuses drain.    Apply heat to the painful areas of the face. Use a towel soaked in hot water. Or  the shower with the hot spray on your face. This is a good way to inhale warm water vapor and get heat on your face at the same time. Cover your mouth and nose with your hands so you can still breathe as you do this.    Use a cool mist vaporizer at night. Suck on peppermint, menthol, or eucalyptus hard candies during the day.    An expectorant containing guaifenesin helps thin the mucus. It also helps your sinuses drain.    You may use over-the-counter decongestants unless a similar medicine was prescribed. Nasal sprays or drops work the fastest. Use one that contains phenylephrine or oxymetazoline.  First blow your nose gently to remove mucus. Then apply the spray or drops. Don't use decongestant nasal sprays or drops more often than the label says or for more than 3 days. This can make symptoms worse. Nasal sprays or drops prescribed by your doctor typically do not have these limits. Check with your doctor or pharmacist. You may also use oral tablets containing pseudoephedrine. Side effects from oral decongestants tend to be worse than with nasal sprays or drops, and may keep you from using them. Many sinus remedies combine ingredients, which may increase side effects. Also, if you are taking a combination medicine with another medicine, be sure you are not taking a double dose of anything by mistake. Read the labels or ask the pharmacist for help. Talk with your doctor before using decongestants if you have high blood pressure, heart disease, glaucoma, or prostate trouble.    Antihistamines may help if allergies are causing your sinusitis. You can get chlorpheniramine and diphenhydramine over the counter, but these can cause drowsiness. Don't use these if you have glaucoma or if you are a man with trouble urinating due to an enlarged prostate. Over-the-counter antihistamines containing loratidine and cetirizine cause less drowsiness and may be a better choice for daytime use.    When allergies cause your sinusitis, a saline nasal rinse may give relief. A saline nasal rinse reduces swelling and clears excess mucus. This allows sinuses to drain. Prepackaged kits are available at most [facility]. These contain premixed salt packets and an irrigation device. If antibiotics have been prescribed to treat an acute sinus infection, talk with your doctor before using a nasal rinse to be sure it is safe for you.    You may use over-the-counter medicine to control pain and fever, unless another pain medicine was prescribed. Talk with your doctor before using acetaminophen or ibuprofen if you have chronic liver or kidney  disease. Also talk with your doctor if you have ever had a stomach ulcer. Aspirin should never be used in anyone under 18 years of age who has a fever. It may cause a life-threatening condition called Reye syndrome.    If antibiotics were given, finish all of them, even if you are feeling better after a few days.  Follow-up care  Follow up with your healthcare provider, or as advised if your symptoms aren't better in 1 week.  Call 911  Call 911 if any of these occur:    Unusual drowsiness or confusion    Swelling of the forehead or eyelids    Vision problems including blurred or double vision    Seizure  When to seek medical advice  Call your healthcare provider right away if any of these occur:    Facial pain or headache becomes more severe    Stiff neck    Fever of 100.4  F (38  C) or higher, or as directed by your healthcare provider    Bleeding from the nose or throat  Date Last Reviewed: 10/1/2016    3513-1688 [employer]. [address] [place] [address]. All rights reserved. This information is not intended as a substitute for professional medical care. Always follow your healthcare professional's instructions.           Patient Education     Self-Care for Headaches    Most headaches aren't serious and can be relieved with self-care. But some headaches may be a sign of another health problem like eye trouble or high blood pressure. To find the best treatment, learn what kind of headaches you get. For tension headaches, self-care will usually help. To treat migraines, ask your healthcare provider for advice. It is also possible to get both tension and migraine headaches. Self-care involves relieving the pain and avoiding headache  triggers  if you can.  Ways to reduce pain and tension  Try these steps:    Apply a cold compress or ice pack to the pain site.    Drink fluids. If nausea makes it hard to drink, try sucking on ice.    Rest. Protect yourself from bright light and loud  "noises.    Calm your emotions by imagining a peaceful scene.    Massage tight neck, shoulder, and head muscles.    To relax muscles, soak in a hot bath or use a hot shower.  Use medicines  Aspirin or other over-the-counter pain medicines, such as ibuprofen and acetaminophen, can relieve headache. Remember: Never give aspirin to anyone 18 years old or younger because of the risk of developing Reye syndrome. Use pain medicines only when needed. Certain prescription medicines, if taken too often, can lead to rebound headaches. Check with your healthcare provider or pharmacist about your medicines.  Track your headaches  Keeping a headache diary can help you and your healthcare provider identify what's causing your headaches:    Note when each headache happens.    Identify your activities and the foods you've eaten 6 to 8 hours before the headache began.    Look for any trends or \"triggers.\"  Signs of tension headache  Any of the following can be signs:    Dull pain or feeling of pressure in a tight band around your head    Pain in your neck or shoulders    Headache without a definite beginning or end    Headache after an activity such as driving or working on a computer  Signs of migraine  Any of the following can be signs:    Throbbing pain on one or both sides of your head    Nausea or vomiting    Extreme sensitivity to light, sound, and smells    Bright spots, flashes, or other visual changes    Pain or nausea so severe that you can't continue your daily activities  Call your healthcare provider   If you have any of the following symptoms, contact your healthcare provider:    A headache that lingers after a recent injury or bump to the head.    A fever with a stiff neck or pain when you bend your head toward your chest.    A headache along with slurred speech, changes in your vision, or numbness or weakness in your arms or legs.    A headache for longer than 3 days.    Frequent headaches, especially in the " "morning.    Headaches with seizures     Seek immediate medical attention if you have a headache that you would call \"the worst headache you have ever had.\"  Date Last Reviewed: 3/1/2018    6012-3065 [employer]. [address] [place] [address], [place] [address]. All rights reserved. This information is not intended as a substitute for professional medical care. Always follow your healthcare professional's instructions.           Patient Education     Neck Spasm   A spasm of the neck muscles can happen after a sudden awkward neck movement. Sleeping with your neck in a crooked position can also cause spasm. Some people respond to emotional stress by tensing the muscles of their neck, shoulders, and upper back. If neck spasm lasts long enough, it can cause a headache.  The treatment described below will usually help the pain to go away in 5 to 7 days. Pain that continues may need further evaluation or other types of treatment such as physical therapy.  Home care    Rest and relax the muscles. Use a comfortable pillow that supports the head and keeps the spine in a neutral position. The position of the head should not be tilted forward or backward. A rolled up towel may help for a custom fit.    Some people find relief with heat. Heat can be applied with either a warm shower or bath or a moist towel heated in the microwave and massage. Others prefer cold packs. You can make an ice pack by filling a plastic bag that seals at the top with ice cubes or crushed ice and then wrapping it with a thin towel. Try both and use the method that feels best for 15 to 20 minutes, several times a day.    Whether using ice or heat, be careful that you don't injure your skin. Never put ice directly on the skin. Always wrap the ice in a towel or other type of cloth. This is very important, especially in people with poor skin sensation.    Try to reduce your stress level. Emotional stress can lead to neck muscle tension and get in " the way of or delay the healing process.    You may use over-the-counter pain medicine to control pain, unless another medicine was prescribed. If you have chronic liver or kidney disease or ever had a stomach ulcer or gastrointestinal bleeding, talk with your healthcare provider before using these medicines.    Follow-up care  Follow up with your healthcare provider if your symptoms don't show signs of improvement after one week. Physical therapy or further tests may be needed.  If X-rays, CT scans, or MRI scans were taken, you will be told of any new findings that may affect your care.  Call 911  Call 911 if you have:    Sudden weakness or numbness in one or both arms or legs    Neck swelling, trouble with or painful swallowing    Trouble breathing    Chest pain  When to seek medical advice  Call your healthcare provider right away if any of these occur:    Pain becomes worse or spreads into one or both arms or legs    Increasing headache with nausea or vomiting    Fever of 100.4 F (38 C) or higher, or as directed by your healthcare provider    Chills  Date Last Reviewed: 5/1/2018 2000-2019 [employer]. [address] [place] [address], [place] [address]. All rights reserved. This information is not intended as a substitute for professional medical care. Always follow your healthcare professional's instructions.               Return in about 2 days (around 5/8/2020), or if symptoms worsen or fail to improve.    Curtis Ramirez, DO  [facility]

## 2020-05-06 NOTE — LETTER
[facility]  [address], [address]  [place]   [address]  [phone]      May 6, 2020    RE:Sammie Ramirez  [address] [facility] [address]    1975            Dear Ms. Ramirez      To whom it may concern:    Sammie Ramirez is under my professional care for Sinus headache.   She should be excused from 05/04/202 through today, 05/06/202. She  may return to work on or about 05/11/2020.        Sincerely,    Curtis Ramirez D.O.

## 2020-05-06 NOTE — PATIENT INSTRUCTIONS
See hand out for advice also. Continue your current management other then the use of Imitrex, plus take the new antibiotic treatment. Try to find ways to relax your neck muscles, to include doing what was shown today during exam with the manipulation.     Patient Education     Sinus Headache    The sinuses are air-filled spaces within the bones of the face. They connect to the inside of the nose. Sinusitis is an inflammation of the tissue lining the sinus cavity. Sinus inflammation can occur during a cold or hay fever (allergies to pollens and other particles in the air) and cause symptoms of sinus congestion and fullness and perhaps a low-grade fever. An infection is usually present when there is also facial pain or headache and green or yellow drainage from the nose or into the back of the throat (postnasal drip). Antibiotics are often prescribed to treat this condition.  Sinus headache may cause pain in different places, depending on which sinuses are infected. There may be pain in the temples, forehead, top of the head, behind or around the eye, across the cheekbone, or into the upper teeth.  You may find that changing your position, sitting upright or lying down, will bring some relief.  Home care  The following guidelines will help you care for yourself at home:    Drink plenty of water, hot tea, and other liquids to stay well hydrated. This thins the mucus and helps your sinuses drain.    Apply heat to the painful areas of the face. Use a towel soaked in hot water. Or  the shower with the hot spray on your face. This is a good way to inhale warm water vapor and get heat on your face at the same time. Cover your mouth and nose with your hands so you can still breathe as you do this.    Use a cool mist vaporizer at night. Suck on peppermint, menthol, or eucalyptus hard candies during the day.    An expectorant containing guaifenesin helps thin the mucus. It also helps your sinuses drain.    You may use  over-the-counter decongestants unless a similar medicine was prescribed. Nasal sprays or drops work the fastest. Use one that contains phenylephrine or oxymetazoline. First blow your nose gently to remove mucus. Then apply the spray or drops. Don't use decongestant nasal sprays or drops more often than the label says or for more than 3 days. This can make symptoms worse. Nasal sprays or drops prescribed by your doctor typically do not have these limits. Check with your doctor or pharmacist. You may also use oral tablets containing pseudoephedrine. Side effects from oral decongestants tend to be worse than with nasal sprays or drops, and may keep you from using them. Many sinus remedies combine ingredients, which may increase side effects. Also, if you are taking a combination medicine with another medicine, be sure you are not taking a double dose of anything by mistake. Read the labels or ask the pharmacist for help. Talk with your doctor before using decongestants if you have high blood pressure, heart disease, glaucoma, or prostate trouble.    Antihistamines may help if allergies are causing your sinusitis. You can get chlorpheniramine and diphenhydramine over the counter, but these can cause drowsiness. Don't use these if you have glaucoma or if you are a man with trouble urinating due to an enlarged prostate. Over-the-counter antihistamines containing loratidine and cetirizine cause less drowsiness and may be a better choice for daytime use.    When allergies cause your sinusitis, a saline nasal rinse may give relief. A saline nasal rinse reduces swelling and clears excess mucus. This allows sinuses to drain. Prepackaged kits are available at most drugstores. These contain premixed salt packets and an irrigation device. If antibiotics have been prescribed to treat an acute sinus infection, talk with your doctor before using a nasal rinse to be sure it is safe for you.    You may use over-the-counter medicine to  control pain and fever, unless another pain medicine was prescribed. Talk with your doctor before using acetaminophen or ibuprofen if you have chronic liver or kidney disease. Also talk with your doctor if you have ever had a stomach ulcer. Aspirin should never be used in anyone under 18 years of age who has a fever. It may cause a life-threatening condition called Reye syndrome.    If antibiotics were given, finish all of them, even if you are feeling better after a few days.  Follow-up care  Follow up with your healthcare provider, or as advised if your symptoms aren't better in 1 week.  Call 911  Call 911 if any of these occur:    Unusual drowsiness or confusion    Swelling of the forehead or eyelids    Vision problems including blurred or double vision    Seizure  When to seek medical advice  Call your healthcare provider right away if any of these occur:    Facial pain or headache becomes more severe    Stiff neck    Fever of 100.4  F (38  C) or higher, or as directed by your healthcare provider    Bleeding from the nose or throat  Date Last Reviewed: 10/1/2016    1451-0384 [employer]. [address] [place] [address]. All rights reserved. This information is not intended as a substitute for professional medical care. Always follow your healthcare professional's instructions.           Patient Education     Self-Care for Headaches    Most headaches aren't serious and can be relieved with self-care. But some headaches may be a sign of another health problem like eye trouble or high blood pressure. To find the best treatment, learn what kind of headaches you get. For tension headaches, self-care will usually help. To treat migraines, ask your healthcare provider for advice. It is also possible to get both tension and migraine headaches. Self-care involves relieving the pain and avoiding headache  triggers  if you can.  Ways to reduce pain and tension  Try these steps:    Apply a cold  "compress or ice pack to the pain site.    Drink fluids. If nausea makes it hard to drink, try sucking on ice.    Rest. Protect yourself from bright light and loud noises.    Calm your emotions by imagining a peaceful scene.    Massage tight neck, shoulder, and head muscles.    To relax muscles, soak in a hot bath or use a hot shower.  Use medicines  Aspirin or other over-the-counter pain medicines, such as ibuprofen and acetaminophen, can relieve headache. Remember: Never give aspirin to anyone 18 years old or younger because of the risk of developing Reye syndrome. Use pain medicines only when needed. Certain prescription medicines, if taken too often, can lead to rebound headaches. Check with your healthcare provider or pharmacist about your medicines.  Track your headaches  Keeping a headache diary can help you and your healthcare provider identify what's causing your headaches:    Note when each headache happens.    Identify your activities and the foods you've eaten 6 to 8 hours before the headache began.    Look for any trends or \"triggers.\"  Signs of tension headache  Any of the following can be signs:    Dull pain or feeling of pressure in a tight band around your head    Pain in your neck or shoulders    Headache without a definite beginning or end    Headache after an activity such as driving or working on a computer  Signs of migraine  Any of the following can be signs:    Throbbing pain on one or both sides of your head    Nausea or vomiting    Extreme sensitivity to light, sound, and smells    Bright spots, flashes, or other visual changes    Pain or nausea so severe that you can't continue your daily activities  Call your healthcare provider   If you have any of the following symptoms, contact your healthcare provider:    A headache that lingers after a recent injury or bump to the head.    A fever with a stiff neck or pain when you bend your head toward your chest.    A headache along with slurred " "speech, changes in your vision, or numbness or weakness in your arms or legs.    A headache for longer than 3 days.    Frequent headaches, especially in the morning.    Headaches with seizures     Seek immediate medical attention if you have a headache that you would call \"the worst headache you have ever had.\"  Date Last Reviewed: 3/1/2018    4420-0736 [employer]. [address] [place] [address]. All rights reserved. This information is not intended as a substitute for professional medical care. Always follow your healthcare professional's instructions.           Patient Education     Neck Spasm   A spasm of the neck muscles can happen after a sudden awkward neck movement. Sleeping with your neck in a crooked position can also cause spasm. Some people respond to emotional stress by tensing the muscles of their neck, shoulders, and upper back. If neck spasm lasts long enough, it can cause a headache.  The treatment described below will usually help the pain to go away in 5 to 7 days. Pain that continues may need further evaluation or other types of treatment such as physical therapy.  Home care    Rest and relax the muscles. Use a comfortable pillow that supports the head and keeps the spine in a neutral position. The position of the head should not be tilted forward or backward. A rolled up towel may help for a custom fit.    Some people find relief with heat. Heat can be applied with either a warm shower or bath or a moist towel heated in the microwave and massage. Others prefer cold packs. You can make an ice pack by filling a plastic bag that seals at the top with ice cubes or crushed ice and then wrapping it with a thin towel. Try both and use the method that feels best for 15 to 20 minutes, several times a day.    Whether using ice or heat, be careful that you don't injure your skin. Never put ice directly on the skin. Always wrap the ice in a towel or other type of cloth. This is very " important, especially in people with poor skin sensation.    Try to reduce your stress level. Emotional stress can lead to neck muscle tension and get in the way of or delay the healing process.    You may use over-the-counter pain medicine to control pain, unless another medicine was prescribed. If you have chronic liver or kidney disease or ever had a stomach ulcer or gastrointestinal bleeding, talk with your healthcare provider before using these medicines.    Follow-up care  Follow up with your healthcare provider if your symptoms don't show signs of improvement after one week. Physical therapy or further tests may be needed.  If X-rays, CT scans, or MRI scans were taken, you will be told of any new findings that may affect your care.  Call 911  Call 911 if you have:    Sudden weakness or numbness in one or both arms or legs    Neck swelling, trouble with or painful swallowing    Trouble breathing    Chest pain  When to seek medical advice  Call your healthcare provider right away if any of these occur:    Pain becomes worse or spreads into one or both arms or legs    Increasing headache with nausea or vomiting    Fever of 100.4 F (38 C) or higher, or as directed by your healthcare provider    [employer]  Date Last Reviewed: 5/1/2018 2000-2019 [employer]. [address] [place] [address], [place] [address]. All rights reserved. This information is not intended as a substitute for professional medical care. Always follow your healthcare professional's instructions.

## 2020-05-06 NOTE — TELEPHONE ENCOUNTER
Reason for call:  ASL  returning call for Sammie.    Phone number to reach patient:  Home number on file [phone] (home)    Best Time:  any    Can we leave a detailed message on this number?  NO

## 2020-05-06 NOTE — PATIENT INSTRUCTIONS
1. Post-traumatic osteoarthritis of right knee    2. Patellar tendinitis of right knee      -Patient has right knee pain due to aggravation of arthritis and new onset tendinitis  -Patient tolerated cortisone injection today without complications.    -Patient will start formal physical therapy and home exercise program  -Patient will start Diclofenac 75mg twice a day as needed for pain.  Patient did not get any pain relief with Meloxicam from her last visit.  -Patient may start Zofran as needed for nausea s/p cortisone injection (patient developed 3 days of nausea after previous injection)  -Call direct clinic number [[phone]] at any time with questions or concerns.    Albert Yeo MD [facility]  [facility] [place]

## 2020-05-06 NOTE — LETTER
5/6/2020         RE: Sammie Ramirez  [address] [facility] [address]        Dear Colleague,    Thank you for referring your patient, Sammie Ramirez, to the [facility]. Please see a copy of my visit note below.    ASSESSMENT & PLAN  Patient Instructions     1. Post-traumatic osteoarthritis of right knee    2. Patellar tendinitis of right knee      -Patient has right knee pain due to aggravation of arthritis and new onset tendinitis  -Patient tolerated cortisone injection today without complications.    -Patient will start formal physical therapy and home exercise program  -Patient will start Diclofenac 75mg twice a day as needed for pain.  Patient did not get any pain relief with Meloxicam from her last visit.  -Patient may start Zofran as needed for nausea s/p cortisone injection (patient developed 3 days of nausea after previous injection)  -Call direct clinic number [[phone]] at any time with questions or concerns.    Albert Yeo MD [facility]  [facility]            -----    SUBJECTIVE:  Sammie Ramirez is a 44 year old female who is seen in follow-up for right knee pain.They were last seen 1/15/20 and received a right knee intra articular cortisone injection which provided good relief lasting approximately 3 months.     Since their last visit reports that her schedule changed at work and she started working overnights about 1 month ago. She reports that increased, prolonged walking and standing increase pain. She notes pain has improved over the last 2 weeks because she has not been working. They indicate that their current pain level is 0/10 but pain at worst is 10/10. They have tried rest/activity avoidance, elevation, ES Tylenol, ibuprofen and other medications: meloxicam (did not provide relief).      The patient is seen with an .    Patient's past medical, surgical, social, and family histories were  "reviewed today and no changes are noted.    REVIEW OF SYSTEMS:  Constitutional: NEGATIVE for fever, chills, change in weight  Skin: NEGATIVE for worrisome rashes, moles or lesions  GI/: NEGATIVE for bowel or bladder changes  Neuro: NEGATIVE for weakness, dizziness or paresthesias    OBJECTIVE:  /84   Ht 1.632 m (5' 4.25\")   Wt 56.7 kg (125 lb)   LMP 05/05/2020 (Exact Date)   BMI 21.29 kg/m     General: healthy, alert and in no distress  HEENT: no scleral icterus or conjunctival erythema  Skin: no suspicious lesions or rash. No jaundice.  CV: regular rhythm by palpation, no pedal edema  Resp: normal respiratory effort without conversational dyspnea   Psych: normal mood and affect  Gait: normal steady gait with appropriate coordination and balance  Neuro: normal light touch sensory exam of the extremities.    MSK:  RIGHT KNEE  Inspection:    normal alignment, small scar over patella  Palpation:    Tender about the lateral patellar facet, medial patellar facet, lateral joint line and medial joint line; patellar tendon and insertion. Remainder of bony and ligamentous landmarks are nontender.    No effusion is present    Patellofemoral crepitus is Present  Range of Motion:     00 extension to 1350 flexion  Strength:    Quadriceps 5-/5    Extensor mechanism intact  Special Tests:    Positive: Patellar grind    Negative: MCL/valgus stress (0 & 30 deg), LCL/varus stress (0 & 30 deg), Lachman's, anterior drawer, posterior drawer, Russell's  Independent visualization of the below image:    Large Joint Injection/Arthocentesis: R knee joint    Date/Time: 5/6/2020 2:16 PM  Performed by: Yeo, Albert, MD  Authorized by: Yeo, Albert, MD     Indications:  Pain and osteoarthritis  Needle Size:  25 G  Guidance: ultrasound    Approach:  Superolateral  Location:  Knee      Medications:  40 mg triamcinolone 40 MG/ML  Outcome:  Tolerated well, no immediate complications  Procedure discussed: discussed risks, benefits, and " alternatives    Consent Given by:  Patient  Timeout: timeout called immediately prior to procedure    Prep: patient was prepped and draped in usual sterile fashion            Patient's conditions were thoroughly discussed during today's visit with greater than 50% of the visit spent counseling the patient with total time spent face-to-face with the patient being 20 minutes.    Albert Yeo MD, [facility]          Again, thank you for allowing me to participate in the care of your patient.        Sincerely,        Albert Yeo, MD

## 2020-05-06 NOTE — TELEPHONE ENCOUNTER
Patient arrived to appt.  was confirmed for the appointment.       Closing encounter.     Lauryn Jara M.Ed., LAT, ATC

## 2020-05-07 ENCOUNTER — TELEPHONE (OUTPATIENT)
Dept: FAMILY MEDICINE | Facility: CLINIC | Age: 45
End: 2020-05-07

## 2020-05-07 DIAGNOSIS — L30.9 DERMATITIS: ICD-10-CM

## 2020-05-07 RX ORDER — TRIAMCINOLONE ACETONIDE 1 MG/G
CREAM TOPICAL
Qty: 30 G | Refills: 3 | Status: SHIPPED | OUTPATIENT
Start: 2020-05-07 | End: 2021-06-07

## 2020-05-07 NOTE — TELEPHONE ENCOUNTER
Pt calling with ASL stating she was advised yesterday to call PCP to discuss changing her sumatriptan.  Pt schedule telephone visit for next week but was advised to call her insurance and see what medications are on her specific formulary plan and have that available for her appt.    Mary Beth Izquierdo RN, BSN

## 2020-05-07 NOTE — TELEPHONE ENCOUNTER
Routing refill request to provider for review/approval because:  Last filled by PCP 9/5/14   [facility] filled this 5/15/18    Marysol Moralez RN

## 2020-05-11 ENCOUNTER — VIRTUAL VISIT (OUTPATIENT)
Dept: FAMILY MEDICINE | Facility: CLINIC | Age: 45
End: 2020-05-11
Payer: MEDICARE

## 2020-05-11 DIAGNOSIS — G43.911 INTRACTABLE MIGRAINE WITH STATUS MIGRAINOSUS, UNSPECIFIED MIGRAINE TYPE: Primary | ICD-10-CM

## 2020-05-11 DIAGNOSIS — G43.909 MIGRAINE WITHOUT STATUS MIGRAINOSUS, NOT INTRACTABLE, UNSPECIFIED MIGRAINE TYPE: ICD-10-CM

## 2020-05-11 DIAGNOSIS — J01.00 ACUTE NON-RECURRENT MAXILLARY SINUSITIS: ICD-10-CM

## 2020-05-11 DIAGNOSIS — R51.9 SINUS HEADACHE: ICD-10-CM

## 2020-05-11 PROCEDURE — 99442 ZZC PHYSICIAN TELEPHONE EVALUATION 11-20 MIN: CPT | Performed by: FAMILY MEDICINE

## 2020-05-11 RX ORDER — CEFDINIR 300 MG/1
300 CAPSULE ORAL 2 TIMES DAILY
Qty: 40 CAPSULE | Refills: 0 | Status: SHIPPED | OUTPATIENT
Start: 2020-05-11 | End: 2020-05-31

## 2020-05-11 RX ORDER — MOMETASONE FUROATE MONOHYDRATE 50 UG/1
2 SPRAY, METERED NASAL DAILY
Qty: 17 G | Refills: 1 | Status: SHIPPED | OUTPATIENT
Start: 2020-05-11 | End: 2020-10-26

## 2020-05-11 RX ORDER — SUMATRIPTAN 50 MG/1
TABLET, FILM COATED ORAL
Qty: 9 TABLET | Refills: 5
Start: 2020-05-11 | End: 2021-01-21

## 2020-05-11 NOTE — TELEPHONE ENCOUNTER
Called [employer] and pt formulary includes the followin.  Generic rizartriptan 5mg and 10mg without a PA (brand would require a PA)  2.  Brand name Relpax 20 mg and 40 mg covered without a PA (generic requires a PA)  3. Zomig (both generic and brand require a PA)    Pt has appt this afternoon with PCP

## 2020-05-11 NOTE — LETTER
[facility]  [id number] [place] [address]  Phone: [phone]    May 11, 2020        Sammie Ramirez  [address] [facility] [address] [facility] [address]          To whom it may concern:    RE: Sammie Ramirez    Patient was seen and treated today at our clinic and missed work.  Excused from work 5/11/2020 and 5/12/2020.    Please contact me for questions or concerns.      Sincerely,    Walter Wesley MD

## 2020-05-11 NOTE — PROGRESS NOTES
"Sammie Ramirez is a 44 year old female who is being evaluated via a billable telephone visit.      The patient has been notified of following:     \"This telephone visit will be conducted via a call between you and your physician/provider. We have found that certain health care needs can be provided without the need for a physical exam.  This service lets us provide the care you need with a short phone conversation.  If a prescription is necessary we can send it directly to your pharmacy.  If lab work is needed we can place an order for that and you can then stop by our lab to have the test done at a later time.    Telephone visits are billed at different rates depending on your insurance coverage. During this emergency period, for some insurers they may be billed the same as an in-person visit.  Please reach out to your insurance provider with any questions.    If during the course of the call the physician/provider feels a telephone visit is not appropriate, you will not be charged for this service.\"    Patient has given verbal consent for Telephone visit?  Yes    What phone number would you like to be contacted at? [phone]    How would you like to obtain your AVS? Mail a copy    Subjective     Sammie Ramirez is a 44 year old female who presents to clinic today for the following health issues:    HPI  Patient needs to talk about getting a new medication that is covered by her insurance. She is still having a headache, finished antibiotics yesterday. She would like a  Note for missing work tonight.     Patient with ongoing intermittent headache that has been present for about 10 days overall.  See prior notes for details.  Has bilateral sinus pressure with some clear drainage.  Has had allergies in the past and does not feel that fluticasone is working well as nasal spray.  Continues antihistamine use.  Denies fever, sweats, or chills.  Denies tooth pain or bloody nasal drainage.  Was given azithromycin for " possible sinus infection and did not have significant improvements.  Headache has had elements of sensitivity to light or sound but no significant nausea or vomiting since the beginning of the headache.  No numbness, tingling, weakness, balance change, or vision issues.    Patient Active Problem List   Diagnosis     Sensorineural hearing loss, bilateral     SND (sensorineural deafness)     Seasonal allergic rhinitis     S/P LEEP of cervix     Migraine headache     Past Surgical History:   Procedure Laterality Date     LEEP TX, CERVICAL  x 2 per chart    historical       Social History     Tobacco Use     Smoking status: Never Smoker     Smokeless tobacco: Never Used   Substance Use Topics     Alcohol use: Yes     Comment: wine     Family History   Problem Relation Age of Onset     Family History Negative Mother      Family History Negative Father            Reviewed and updated as needed this visit by Provider  Problems         Review of Systems   CONSTITUTIONAL:no fever  EYES: no vision change  ENT/MOUTH: as noted above  RESP: no cough  NEURO: NEGATIVE for weakness, dizziness or paresthesias       Objective   Reported vitals:  [facility] 05/05/2020 (Exact Date)    healthy, alert and no distress - phone visit through         Assessment/Plan:  1. Intractable migraine with status migrainosus, unspecified migraine type  Patient with ongoing headache issues with waxing and waning severity.  Having some elements of migraine at times.  This may represent a intractable migraine versus sinus headache versus other.  At this time I am not sure that her Imitrex will have much relief with duration of headache.  Offered further analgesics but she did not want that today and will call back if she is worsening.  Can use Tylenol and/or ibuprofen.    2. Sinus headache  Agree with probability of sinus headache being at least a component of her overall headache issue and recommend treatment with antibiotic below, allergic to  penicillin otherwise would consider Augmentin.  Recommended nasal steroid spray and treating allergies with antihistamine.  If not improving in 2 weeks consider CT sinuses.  - mometasone (NASONEX) 50 MCG/ACT nasal spray; Spray 2 sprays into both nostrils daily  Dispense: 17 g; Refill: 1  - cefdinir (OMNICEF) 300 MG capsule; Take 1 capsule (300 mg) by mouth 2 times daily for 20 days  Dispense: 40 capsule; Refill: 0    3. Acute non-recurrent maxillary sinusitis  - cefdinir (OMNICEF) 300 MG capsule; Take 1 capsule (300 mg) by mouth 2 times daily for 20 days  Dispense: 40 capsule; Refill: 0    4. Migraine without status migrainosus, not intractable, unspecified migraine type  - SUMAtriptan (IMITREX) 50 MG tablet; TAKE 1 TABLET BY MOUTH AT ONSET OF HEADACHE. MAY REPEAT DOSE IN 2HRS.DO NOT EXCEED 4TABS IN 24HRS  Dispense: 9 tablet; Refill: 5    Return in about 3 months (around 8/11/2020) for routine physical/pap with OB/Gyn.      Phone call duration:  15 minutes    Walter Wesley MD

## 2020-05-11 NOTE — TELEPHONE ENCOUNTER
Patient calling and [facility] called her insurance and was told they could not give her covered options ant that the  nurse or doctor needs to call for covered options.  [facility] was told to have clinic call [phone] ID- [id number].  Cierra Gao RN

## 2020-05-12 ENCOUNTER — TELEPHONE (OUTPATIENT)
Dept: FAMILY MEDICINE | Facility: CLINIC | Age: 45
End: 2020-05-12

## 2020-05-12 NOTE — TELEPHONE ENCOUNTER
LMTRC via video relay     Received notice from pharmacy that mometasone is not covered by insurance and the alternatives are fluticasone or flunisolide.      Per records this has been an issue in the past and pt has tried the fluticasone but per 12/10/19 telephone encounter pt reported it doesn't work and has a bad taste so she preferred the mometasone.    Pt will need to pay for this or use [facility] if she prefers to stay on this medication as her insurance won't cover it.    Mary Beth Izquierdo RN, BSN

## 2020-05-12 NOTE — TELEPHONE ENCOUNTER
Prior Authorization Approval    Authorization Effective Date: 2/12/2020  Authorization Expiration Date: 5/12/2021  Medication: mometasone (NASONEX) 50 MCG/ACT nasal spray -APPROVED  Approved Dose/Quantity:   Reference #:     Insurance Company: Medicare Blue LocAsian Phone [phone] Fax [phone]  Expected CoPay:       CoPay Card Available:      Foundation Assistance Needed:    Which Pharmacy is filling the prescription (Not needed for infusion/clinic administered): [facility] [id number] [facility] [address] [facility]  Pharmacy Notified: Yes  Patient Notified: No    Pharmacy will notify patient when medication is ready.

## 2020-05-12 NOTE — TELEPHONE ENCOUNTER
Central Prior Authorization Team   Phone: [phone]      PA Initiation    Medication: mometasone (NASONEX) 50 MCG/ACT nasal spray -Initiated  Insurance Company: Medicare Blue - Phone [phone] Fax [phone]  Ph[facility]illing [facility] [id number] [place] [facility] [address] [facility]  Filling Pharmacy Phone: [phone]  Filling Pharmacy Fax:    Start Date: 5/12/2020

## 2020-05-12 NOTE — TELEPHONE ENCOUNTER
Prior Authorization Retail Medication Request    Medication/Dose: mometasone (NASONEX) 50 MCG/ACT nasal spray   ICD code (if different than what is on RX):    Sinus headache [R51]        Acute non-recurrent maxillary sinusitis [J01.00]         Previously Tried and Failed:  Fluticasone    Rationale:  doesn't work and has a bad taste     Insurance Name:  NA  Insurance ID:  NA      Pharmacy Information (if different than what is on RX)  Name:  [facility] [address]  Phone:  [phone]    Deborah Wellington RN

## 2020-05-13 ENCOUNTER — TELEPHONE (OUTPATIENT)
Dept: FAMILY MEDICINE | Facility: CLINIC | Age: 45
End: 2020-05-13

## 2020-05-13 NOTE — TELEPHONE ENCOUNTER
"Pt calls via SLI, see below and advise    S-(situation): see May visits for sinus issues, still not 100 %, wants new work note to include tonight, \"just in case its needed\",  after tonight does not work until Monday, also wants to use ibuprofen     B-(background): see 5/11/20 virtual visit, calls with update    A-(assessment): sinus headache/migraine    R-(recommendations): routed to , please advise letter, see letter started,   Confirm plan, route to inform pt when final, pt wants you to CONFIRM ok to take ibuprofen, pt wants letter emailed if possible    Jessica King RN, BSN  Message handled by Nurse Triage.    "

## 2020-05-13 NOTE — LETTER
May 13, 2020      Sammie Ramirez  [address] [facility] [address]      To whom it may concern:    RE: Sammie Ramirez    Patient was seen and treated today at our clinic and missed work.  Excused from work 5/11/2020,  5/12/2020 and 5/13/2020.     Please contact me for questions or concerns.      Sincerely,    Walter Wesley MD

## 2020-05-19 ENCOUNTER — NURSE TRIAGE (OUTPATIENT)
Dept: FAMILY MEDICINE | Facility: CLINIC | Age: 45
End: 2020-05-19

## 2020-05-19 DIAGNOSIS — B37.31 YEAST INFECTION OF THE VAGINA: Primary | ICD-10-CM

## 2020-05-19 RX ORDER — FLUCONAZOLE 150 MG/1
150 TABLET ORAL ONCE
Qty: 1 TABLET | Refills: 0 | Status: SHIPPED | OUTPATIENT
Start: 2020-05-19 | End: 2020-05-19

## 2020-05-19 NOTE — TELEPHONE ENCOUNTER
"Pt calls,    1) wants pill for \"yeast infection\", taking cefdinir and now has vaginal itching and discharge  2) had to leave work last night due to vaginal itching and discharge, wants work note to excuse last night, will  note when ready    **ROUTED TO , PLEASE ADVISE, INFORM PT WHEN FINAL**    Telephone Information:   Mobile [phone]       Additional Information    Negative: Pain or burning with passing urine (urination) is main symptom    Negative: Vaginal discharge is the main symptom    Negative: Pubic lice suspected    Negative: STD exposure and prevention, question about    Negative: Patient sounds very sick or weak to the triager    Negative: SEVERE pain (e.g., excruciating)    Negative: Genital area looks infected (e.g., draining sore, spreading redness)    Negative: Rash with painful tiny water blisters    Negative: Patient wants to be seen    MODERATE-SEVERE itching (i.e., interferes with school, work, or sleep)    Answer Assessment - Initial Assessment Questions  1. SYMPTOM: \"What's the main symptom you're concerned about?\" (e.g., rash, itching, swelling, dryness)      Vaginal itching   2. LOCATION: \"Where is the itching located?\" (e.g., inside/outside, left/right)      labia  3. ONSET: \"When did the  yesterday start?\"      yesterday  4. PAIN: \"Is there any pain?\" If so, ask: \"How bad is it?\" (Scale: 1-10; mild, moderate, severe) No pain  5. CAUSE: \"What do you think is causing the symptoms?\"      Recent antibiotic   6. OTHER SYMPTOMS: \"Do you have any other symptoms?\" (e.g., fever, vaginal bleeding, pain with urination)      no  7. PREGNANCY: \"Is there any chance you are pregnant?\" \"When was your last menstrual period?\"      no    Protocols used: VULVAR SYMPTOMS-A-OH    Jessica King RN, BSN  Message handled by Nurse Triage.    "

## 2020-05-19 NOTE — LETTER
May 19, 2020      Sammie Ramirez  [address] [facility] [address] [facility] [address]        To Whom It May Concern,      Please excuse her from work 5/18/2020 due to medical reasons.     Please let me know if I can be of further assistance.           Sincerely,

## 2020-05-21 ENCOUNTER — TELEPHONE (OUTPATIENT)
Dept: FAMILY MEDICINE | Facility: CLINIC | Age: 45
End: 2020-05-21

## 2020-05-21 NOTE — TELEPHONE ENCOUNTER
Pt has been using diflucan it has not been helpful     Now having bloating, nausea and stomach.     She would prefer to see Krystin Glass for this given contact information to call [place].  If not able to be seen with OB/Gyn can call and schedule with a primary care provider or UC    Marysol Moralez RN

## 2020-05-22 ENCOUNTER — OFFICE VISIT (OUTPATIENT)
Dept: OBGYN | Facility: CLINIC | Age: 45
End: 2020-05-22
Payer: MEDICARE

## 2020-05-22 VITALS — SYSTOLIC BLOOD PRESSURE: 106 MMHG | DIASTOLIC BLOOD PRESSURE: 84 MMHG | WEIGHT: 121.7 LBS | BODY MASS INDEX: 20.73 KG/M2

## 2020-05-22 DIAGNOSIS — N76.0 VAGINITIS AND VULVOVAGINITIS: Primary | ICD-10-CM

## 2020-05-22 LAB
SPECIMEN SOURCE: ABNORMAL
WET PREP SPEC: ABNORMAL

## 2020-05-22 PROCEDURE — 99213 OFFICE O/P EST LOW 20 MIN: CPT | Performed by: ADVANCED PRACTICE MIDWIFE

## 2020-05-22 PROCEDURE — 87210 SMEAR WET MOUNT SALINE/INK: CPT | Performed by: ADVANCED PRACTICE MIDWIFE

## 2020-05-22 RX ORDER — METRONIDAZOLE 7.5 MG/G
1 GEL VAGINAL DAILY
Qty: 25 G | Refills: 0 | Status: SHIPPED | OUTPATIENT
Start: 2020-05-22 | End: 2020-05-27

## 2020-05-22 NOTE — PATIENT INSTRUCTIONS
Vaginitis (Vaginal Irritation/Infection)    Vaginitis is very common!  The most common vaginal infections are bacterial vaginosis or yeast. These infections are not sexually transmitted but can be incredibly uncomfortable. Seek care from your midwife if signs or symptoms arise.     Normal vaginal discharge:      Is white, clear, thick or thin (it may change depending on where you are in your cycle)    Does not have a foul odor    The amount of discharge varies    Abnormal discharge/symptoms:       Itching in and around the vagina    Redness, pain or swelling    Discharge that is foamy, greenish, curd like, or bloody    Foul smelling odor    Pain when urinating or having sex    Fever    Causes of vaginal infections:      Good bacteria from the vagina have been destroyed by bad bacteria    Reaction to something in the vagina such as a tampon or scented/perfumed soaps or bubble bath    STI's    Sensitivities to soaps/detergents/dryer sheets, lubricants, etc.    Hormonal changes    Recent use of antibiotics     Infections can also occur after you've had intercourse with a new partner or if you have had frequent intercourse         Here is a list of suggestions that may help prevent/treat vaginal infections and will help maintain a healthy vaginal environment:      1.  Boosting your immune system so you can heal faster      Make sure you are getting adequate sleep    Drink 2-3 quarts of fluids per day, Cranberries or cranberry juice (unsweetened)    Eat more nuts, grains, raw veggies, yogurt, robinson, grapefruit    Decrease intake of refined sugar, red meat and alcohol    Echinacea - 3 times a day for chronic problem or every 2 hours for acute symptoms; use as directed on bottle          2.  Changing the vaginal environment to a more acid state       Soak in a warm bath tub with one cup of vinegar or lemon juice. Do not use scented soap, bubble bath, or oils.     Acidophilus capsules:  1 in your vagina at bedtime for 5-7  nights    Herbal sitz bath or ajit-wash with:  TBSP tea tree oil or 2 TBS cider vinegar      3.  Increasing the good healthy bacteria      At each meal drink 1 tsp apple cider vinegar and 1 tsp honey in   cup warm water    Eat garlic daily, capsules or fresh.      Take probiotics 4-8 billion units/day      4.  Preventive measures      Wear cotton underwear, no thongs.  Do not wear tight clothes or pantyhose    Shower soon after working or change out of sweaty clothing     Do not wear underwear to bed.  The vaginal environment needs to breathe    Never douche or use vaginal , the vagina is self-cleaning!    Use white, unscented toilet paper.  Do not use baby wipes.  Wipe from front to back    Use only unscented tampons and pads, buy organic products if desired    Do not use perfumes/oils/lotions near your vagina or take bubble baths    Use only mild, unscented soaps around your vaginal area     Do not use fabric softeners/dryer sheets    Use gentle, unscented detergent, consider buying non-petroleum based detergents    Use only water based lubricants during sexual contact    Abstain from intercourse during times of infection    Alternative Treatment  Boric acid capsules one per vagina (not by mouth!!! Very toxic if taken orally) at bedtime for 5 days (or as suggested by your provider) may be an effective alternative treatment and also more effective for those with chronic yeast vaginitis. Boric acid is available at the pharmacy but must be purchased along with gelatin caps for insertion. It might also be available at a local compounding pharmacy. Boric acid is not safe for pregnant women. Discuss with your midwife if this treatment interests you.     If your symptoms do not resolve or if you have questions please call:     [facility]  [phone]

## 2020-05-22 NOTE — PROGRESS NOTES
SUBJECTIVE:   Sammie is a 44 year old here for vaginal symptoms: itching and burning. Pt reports  that she has been taking 2 courses of antibiotics for a sinus infection and had developed symptoms of a yeast infection 1 week ago. She was treated with Fluconazole and Miconazole 7 which were helpful but symptoms have returned. Pt reports increased vaginal irritation, denies increased discharge, foul odor. Denies urinary symptoms.                   Vaginal Symptoms     Onset: >1 week    Description:  Vaginal Discharge: none  Itching (Pruritis): Yes  Burning sensation:  Yes  Odor:  No  Irritation:  Yes    Accompanying Signs & Symptoms:  Pain with Urination: No  Abdominal Pain:  Yes: pt reports feeling bloated and poor appetite, nausea  Fever: No   History:   Sexually active:  Yes  New Partner:  No  Possibility of Pregnancy: yes, hasn't been sexually active in past month, low possibility   Contraceptive type: natural family planning     Precipitating factors:   Recent Antibiotic Use: Yes: 2 weeks of antibiotics for sinus infection    Alleviating factors: none   Therapies Tried and outcome: Miconazole 7 and Fluconazole x1  Previous Episodes of Vaginitis:  >1 year ago  Other associated symptoms: cramping  History of STI's:  No  STI Testing offered: YES, pt declined, low risk    LMP: Patient's last menstrual period was 05/05/2020 (exact date).      Patient Active Problem List   Diagnosis     Sensorineural hearing loss, bilateral     SND (sensorineural deafness)     Seasonal allergic rhinitis     S/P LEEP of cervix     Migraine headache     Past Medical History:   Diagnosis Date     ASCUS (atypical squamous cells of undetermined significance) on Pap smear 10/13, 2016    Neg HPV     LSIL (low grade squamous intraepithelial lesion) on Pap smear 2/2014     Past Surgical History:   Procedure Laterality Date     LEEP TX, CERVICAL  x 2 per chart    historical     Current Outpatient Medications   Medication Sig Dispense Refill      cefdinir (OMNICEF) 300 MG capsule Take 1 capsule (300 mg) by mouth 2 times daily for 20 days 40 capsule 0     diclofenac (VOLTAREN) 75 MG EC tablet Take 1 tablet (75 mg) by mouth 2 times daily as needed for moderate pain 60 tablet 1     fexofenadine (ALLEGRA) 180 MG tablet Take 1 tablet (180 mg) by mouth daily 90 tablet 0     hydrocortisone (ANUSOL-HC) 2.5 % cream Place rectally 2 times daily as needed for hemorrhoids 70 g 11     ibuprofen (ADVIL/MOTRIN) 800 MG tablet Take 1 tablet (800 mg) by mouth every 8 hours as needed for pain 90 tablet 3     mometasone (NASONEX) 50 MCG/ACT nasal spray Spray 2 sprays into both nostrils daily 17 g 1     Omeprazole (PRILOSEC PO)        omeprazole (PRILOSEC) 40 MG DR capsule Take 1 capsule (40 mg) by mouth daily 90 capsule 3     ondansetron (ZOFRAN ODT) 4 MG ODT tab Take 1 tablet (4 mg) by mouth every 6 hours as needed for nausea 10 tablet 0     ondansetron (ZOFRAN) 4 MG tablet Take 1 tablet (4 mg) by mouth every 6 hours as needed for nausea 12 tablet 0     order for DME Small open patella knee brace  1 Device 0     SUMAtriptan (IMITREX) 50 MG tablet TAKE 1 TABLET BY MOUTH AT ONSET OF HEADACHE. MAY REPEAT DOSE IN 2HRS.DO NOT EXCEED 4TABS IN 24HRS 9 tablet 5     triamcinolone (KENALOG) 0.1 % external cream Apply sparingly to affected area three times daily for 14 days. 30 g 3     fluticasone (FLONASE) 50 MCG/ACT nasal spray INSTILL 1 SPRAY INTO BOTH NOSTRILS DAILY  5     Allergies   Allergen Reactions     Amoxicillin      Rash, hives     Codeine Sulfate      Lactose      Seasonal Allergies        Health maintenance updated:  no    ROS:   CONSTITUTIONAL: NEGATIVE for fever, chills, change in weight  INTEGUMENTARU/SKIN: NEGATIVE for worrisome rashes, moles or lesions  EYES: NEGATIVE for vision changes or irritation  ENT: NEGATIVE for ear, mouth and throat problems, POSITIVE sinus pressure and congeestion  RESP: NEGATIVE for significant cough or SOB  GI: POSITIVE for nausea,  abdominal distention, constipation, bloating  : NEGATIVE for unusual urinary symptoms. Periods are regular. POSITIVE for vaginal itching and irritation   MUSCULOSKELETAL: NEGATIVE for significant arthralgias or myalgia  NEURO: NEGATIVE for weakness, dizziness or paresthesias  PSYCHIATRIC: NEGATIVE for changes in mood or affect    PHYSICAL EXAM:    /84 (BP Location: Right arm, Patient Position: Chair, Cuff Size: Adult Regular)   Wt 55.2 kg (121 lb 11.2 oz)   LMP 05/05/2020 (Exact Date)   Breastfeeding No   BMI 20.73 kg/m  , Body mass index is 20.73 kg/m .  Exam:  Constitutional: healthy, alert and no distress  Respiratory: non-labored  Psychiatric: mentation appears normal and affect normal/bright    Pelvic Exam:    Pelvic Exam:  Vulva: No external lesions, normal hair distribution, no adenopathy  Vagina: Moist, pink, no abnormal discharge, well rugated, no lesions, Swab collected for wet prep.  Cervix:  Smooth, pink, no visible lesions  Rectal exam: Deferred    ASSESSMENT/PLAN:     ICD-10-CM    1. Vaginitis and vulvovaginitis  N76.0 Wet prep       Results for orders placed or performed in visit on 05/22/20   Wet prep     Status: Abnormal    Specimen: Vagina   Result Value Ref Range    Specimen Description Vagina     Wet Prep No Trichomonas seen     Wet Prep Moderate  Clue cells seen   (A)     Wet Prep No yeast seen     Wet Prep Many  WBC'S seen            COUNSELING:  Discussed common causes of bacterial vaginosis  Rx'd Metrogel x5 days, proper usage reviewed   Vaginal health discussed, avoidance of highly scented soaps, douching, tight clothing   Reviewed that GI symptoms may be related to antibiotics she has been taking for sinus infection.   Recommended pregnancy test, pt declined   RTO with new or worsening symptoms or if symptoms not resolved with treatment     YAEL Alexandre CNM 5/22/2020 8:59 AM

## 2020-05-22 NOTE — LETTER
[facility]  [address]  [address]  [facility] [address]  [phone]  Dept: [phone]      5/22/2020    Re: Sammie Ramirez      TO WHOM IT MAY CONCERN:    Sammie Ramirez  was seen on 5/22.  Please excuse her for 1 week due to illness.    Hawk Alexandre CNM 5/22/2020 8:37 AM    [facility]

## 2020-05-22 NOTE — NURSING NOTE
"Chief Complaint   Patient presents with     Gyn Exam     Recurrent yeast issues, itching no discharge odor with urination       Initial /84 (BP Location: Right arm, Patient Position: Chair, Cuff Size: Adult Regular)   Wt 55.2 kg (121 lb 11.2 oz)   LMP 2020 (Exact Date)   Breastfeeding No   BMI 20.73 kg/m   Estimated body mass index is 20.73 kg/m  as calculated from the following:    Height as of 20: 1.632 m (5' 4.25\").    Weight as of this encounter: 55.2 kg (121 lb 11.2 oz).  BP completed using cuff size: regular    Questioned patient about current smoking habits.  Pt. has never smoked.              The following HM Due: NONE      Mago Michaud CMA on 2020 at 8:14 AM        "

## 2020-05-29 NOTE — TELEPHONE ENCOUNTER
Encounter closed, appears TC took care of letter  Jessica King RN, BSN  Message handled by Nurse Triage.

## 2020-07-06 ENCOUNTER — TELEPHONE (OUTPATIENT)
Dept: FAMILY MEDICINE | Facility: CLINIC | Age: 45
End: 2020-07-06

## 2020-07-06 ENCOUNTER — VIRTUAL VISIT (OUTPATIENT)
Dept: FAMILY MEDICINE | Facility: CLINIC | Age: 45
End: 2020-07-06
Payer: MEDICARE

## 2020-07-06 DIAGNOSIS — Z02.89 ENCOUNTER FOR REVIEW OF FORM WITH PATIENT: Primary | ICD-10-CM

## 2020-07-06 PROCEDURE — 99207 ZZC NO BILLABLE SERVICE THIS VISIT: CPT | Performed by: PHYSICIAN ASSISTANT

## 2020-07-06 NOTE — PROGRESS NOTES
"Sammie Ramirez is a 44 year old female who is being evaluated via a billable telephone visit.      The patient has been notified of following:     \"This telephone visit will be conducted via a call between you and your physician/provider. We have found that certain health care needs can be provided without the need for a physical exam.  This service lets us provide the care you need with a short phone conversation.  If a prescription is necessary we can send it directly to your pharmacy.  If lab work is needed we can place an order for that and you can then stop by our lab to have the test done at a later time.    Telephone visits are billed at different rates depending on your insurance coverage. During this emergency period, for some insurers they may be billed the same as an in-person visit.  Please reach out to your insurance provider with any questions.    If during the course of the call the physician/provider feels a telephone visit is not appropriate, you will not be charged for this service.\"    Patient has given verbal consent for Telephone visit?  Yes    What phone number would you like to be contacted at? [phone]    How would you like to obtain your AVS? Mail a copy    Subjective     Sammie Ramirez is a 44 year old female who presents via phone visit today for the following health issues:    HPI  Headache  Onset: two weeks ago it came back again- antibiotics helped from May and then came back 2 weeks ago- filed MNAI from May 26-now     Description:   Location: bilateral in the frontal area   Character: \" haze\"- air pressure difference    Frequency: every day   Duration: comes and goes     Intensity: mild    Progression of Symptoms: same    Accompanying Signs & Symptoms:  Stiff neck: YES  Neck or upper back pain: YES- near the shoulders   Fever: no  Sinus pressure: YES- sometimes   Nausea or vomiting: YES- sometimes pt has nausea   Dizziness: no  Numbness: no  Weakness: no  Visual changes: " no    History:   Head trauma: no  Family history of migraines: YES  Previous tests for headaches: no   Neurologist evaluations: no   Able to do daily activities: YES  Wake with a headaches: YES  Do headaches wake you up: YES  Daily pain medication use: no   Work/school stressors/changes: no     Precipitating factors:   Does light make it worse: no  Does sound make it worse: no     Alleviating factors:  Does sleep help: no    Therapies Tried and outcome: Ibuprofen (Advil, Motrin) and Tylenol with little relief              Review of Systems   Constitutional, HEENT, cardiovascular, pulmonary, gi and gu systems are negative, except as otherwise noted.       Objective   Reported vitals:  There were no vitals taken for this visit.   healthy, alert and no distress  PSYCH: Alert and oriented times 3; coherent speech, normal   rate and volume, able to articulate logical thoughts, able   to abstract reason, no tangential thoughts, no hallucinations   or delusions  Her affect is normal  RESP: No cough, no audible wheezing, able to talk in full sentences  Remainder of exam unable to be completed due to telephone visits    Diagnostic Test Results:  none         Assessment/Plan:  1. Encounter for review of form with patient    Patient requested leave of absence paperwork / a letter to excuse her from work for headache from May 12 through now. However, headache was resolved from mid May up until 2 weeks ago. When asked why she continued on the MANI, she stated that she wanted to make sure that the headache wouldn't come back. She prefers to be seen in person to discuss this so we will cancel this visit and get her scheduled to see someone in person.      No follow-ups on file.      Phone call duration:  10 minutes    Stalin Chavira PA-C

## 2020-07-06 NOTE — TELEPHONE ENCOUNTER
Patient asking for to provide Dr note for absence from work, has had sinus headaches for a couple weeks, gets better but comes right back. Patient states has tried claritin, zyrtec but does not work right away. Nasal spray uses as well. Has been out since May 16th. Scheduled phone visit since has not been seen for this issue since May.    Clotilde TRIPP RN, BSN

## 2020-10-07 ENCOUNTER — OFFICE VISIT (OUTPATIENT)
Dept: FAMILY MEDICINE | Facility: CLINIC | Age: 45
End: 2020-10-07
Payer: MEDICARE

## 2020-10-07 VITALS
HEART RATE: 69 BPM | TEMPERATURE: 98 F | DIASTOLIC BLOOD PRESSURE: 74 MMHG | BODY MASS INDEX: 21.97 KG/M2 | OXYGEN SATURATION: 100 % | WEIGHT: 129 LBS | SYSTOLIC BLOOD PRESSURE: 112 MMHG

## 2020-10-07 DIAGNOSIS — J01.00 ACUTE NON-RECURRENT MAXILLARY SINUSITIS: Primary | ICD-10-CM

## 2020-10-07 PROCEDURE — 99213 OFFICE O/P EST LOW 20 MIN: CPT | Performed by: FAMILY MEDICINE

## 2020-10-07 RX ORDER — DOXYCYCLINE 100 MG/1
100 CAPSULE ORAL 2 TIMES DAILY
Qty: 20 CAPSULE | Refills: 0 | Status: SHIPPED | OUTPATIENT
Start: 2020-10-07 | End: 2020-10-17

## 2020-10-07 NOTE — PATIENT INSTRUCTIONS
Patient Education     Patient Education    Distilled Water, Docusate Sodium, Methylparaben, Poloxamer 182, Propylene Glycol, Propylparaben, Salicylic Acid, Sodium Hydroxide, tetrasodium EDTA Topical pad, cleanser, Doxycycline Monohydrate Oral capsule    Distilled Water, Docusate Sodium, Methylparaben, Poloxamer 182, Propylene Glycol, Propylparaben, Salicylic Acid, Sodium Hydroxide, tetrasodium EDTA Topical pad, cleanser, Doxycycline Monohydrate Oral tablet    Doxycycline Calcium Oral suspension    Doxycycline Hyclate Gastro-resistant tablet    Doxycycline Hyclate Oral capsule    Doxycycline Hyclate Oral capsule [Periodontitis]    Doxycycline Hyclate Oral capsule, gastro-resistant    Doxycycline Hyclate Oral capsule, gastro-resistant pellets    Doxycycline Hyclate Oral capsule, gastro-resistant sprinkles    Doxycycline Hyclate Oral tablet    Doxycycline Hyclate Oral tablet [Periodontitis]    Doxycycline Hyclate Solution for injection    Doxycycline Monohydrate Oral capsule    Doxycycline Monohydrate Oral capsule, biphasic release    Doxycycline Monohydrate Oral suspension    Doxycycline Monohydrate Oral tablet  Doxycycline Hyclate Oral capsule  What is this medicine?  DOXYCYCLINE (dox maik JOSÉ MANUEL boudreauxgabino) is a tetracycline antibiotic. It kills certain bacteria or stops their growth. It is used to treat many kinds of infections, like dental, skin, respiratory, and urinary tract infections. It also treats acne, Lyme disease, malaria, and certain sexually transmitted infections.  This medicine may be used for other purposes; ask your health care provider or pharmacist if you have questions.  What should I tell my health care provider before I take this medicine?  They need to know if you have any of these conditions:    liver disease    long exposure to sunlight like working outdoors    stomach problems like colitis    an unusual or allergic reaction to doxycycline, tetracycline antibiotics, other medicines, foods, dyes, or  preservatives    pregnant or trying to get pregnant    breast-feeding  How should I use this medicine?  Take this medicine by mouth with a full glass of water. Follow the directions on the prescription label. It is best to take this medicine without food, but if it upsets your stomach take it with food. Take your medicine at regular intervals. Do not take your medicine more often than directed. Take all of your medicine as directed even if you think you are better. Do not skip doses or stop your medicine early.  Talk to your pediatrician regarding the use of this medicine in children. Special care may be needed. While this drug may be prescribed for children as young as 8 years old for selected conditions, precautions do apply.  Overdosage: If you think you have taken too much of this medicine contact a poison control center or emergency room at once.  NOTE: This medicine is only for you. Do not share this medicine with others.  What if I miss a dose?  If you miss a dose, take it as soon as you can. If it is almost time for your next dose, take only that dose. Do not take double or extra doses.  What may interact with this medicine?    antacids    barbiturates    birth control pills    bismuth subsalicylate    carbamazepine    methoxyflurane    other antibiotics    phenytoin    vitamins that contain iron    warfarin  This list may not describe all possible interactions. Give your health care provider a list of all the medicines, herbs, non-prescription drugs, or dietary supplements you use. Also tell them if you smoke, drink alcohol, or use illegal drugs. Some items may interact with your medicine.  What should I watch for while using this medicine?  Tell your doctor or health care professional if your symptoms do not improve.  Do not treat diarrhea with over the counter products. Contact your doctor if you have diarrhea that lasts more than 2 days or if it is severe and watery.  Do not take this medicine just  before going to bed. It may not dissolve properly when you lay down and can cause pain in your throat. Drink plenty of fluids while taking this medicine to also help reduce irritation in your throat.  This medicine can make you more sensitive to the sun. Keep out of the sun. If you cannot avoid being in the sun, wear protective clothing and use sunscreen. Do not use sun lamps or tanning beds/booths.  Birth control pills may not work properly while you are taking this medicine. Talk to your doctor about using an extra method of birth control.  If you are being treated for a sexually transmitted infection, avoid sexual contact until you have finished your treatment. Your sexual partner may also need treatment.  Avoid antacids, aluminum, calcium, magnesium, and iron products for 4 hours before and 2 hours after taking a dose of this medicine.  If you are using this medicine to prevent malaria, you should still protect yourself from contact with mosquitos. Stay in screened-in areas, use mosquito nets, keep your body covered, and use an insect repellent.  What side effects may I notice from receiving this medicine?  Side effects that you should report to your doctor or health care professional as soon as possible:    allergic reactions like skin rash, itching or hives, swelling of the face, lips, or tongue    difficulty breathing    fever    itching in the rectal or genital area    pain on swallowing    redness, blistering, peeling or loosening of the skin, including inside the mouth    severe stomach pain or cramps    unusual bleeding or bruising    unusually weak or tired    yellowing of the eyes or skin  Side effects that usually do not require medical attention (report to your doctor or health care professional if they continue or are bothersome):    diarrhea    loss of appetite    nausea, vomiting  This list may not describe all possible side effects. Call your doctor for medical advice about side effects. You may  report side effects to FDA at 4-746-FDA-9934.  Where should I keep my medicine?  Keep out of the reach of children.  Store at room temperature, below 30 degrees C (86 degrees F). Protect from light. Keep container tightly closed. Throw away any unused medicine after the expiration date. Taking this medicine after the expiration date can make you seriously ill.  NOTE:This sheet is a summary. It may not cover all possible information. If you have questions about this medicine, talk to your doctor, pharmacist, or health care provider. Copyright  2016 Gold Standard

## 2020-10-07 NOTE — PROGRESS NOTES
Subjective     Sammie Ramirez is a 44 year old female who presents to clinic today for the following health issues:    HPI         Acute Illness  Acute illness concerns: sinus  Onset/Duration: 9days   Symptoms:  Fever: no  Chills/Sweats: no  Headache (location?): YES- 2 times per week  Sinus Pressure: YES  Conjunctivitis:  no  Ear Pain: no  Rhinorrhea: no  Congestion: YES  Sore Throat: no  Cough: no  Wheeze: no  Decreased Appetite: YES  Nausea: no  Vomiting: no  Diarrhea: no  Dysuria/Freq.: no  Dysuria or Hematuria: no  Fatigue/Achiness: no  Sick/Strep Exposure: no  Therapies tried and outcome: Tylenol, ibuprofen (800 mg), allergy medication and migraine medication all with no help.    Having headache for 9 days, wondered about sinus infection.  Previous infection in May, took 5 weeks to resolve, was treated with antibiotics, two different kinds.  Azithromycin did not work.  Was given Cefdinir, had negative side effects. Worked for about 20 days but had side effects that were intolerable.    Has a history of migraines, had them twice last week, used Imitrex which was somewhat helpful, but headache returned.  This headache is somewhat similar to her previous headaches.  Also has seasonal allergies.  No itchy or watery eyes.  Some nasal congestion, some sinus presure in maxillary sinuses.  Some drainage, light greenish to yellowish, very light to clear in color.          Review of Systems   Constitutional, HEENT, cardiovascular, pulmonary, gi and gu systems are negative, except as otherwise noted.      Objective    /74   Pulse 69   Temp 98  F (36.7  C) (Oral)   Wt 58.5 kg (129 lb)   SpO2 100%   BMI 21.97 kg/m    Body mass index is 21.97 kg/m .  Physical Exam   GENERAL: healthy, alert and no distress  EYES: Eyes grossly normal to inspection, PERRL and conjunctivae and sclerae normal  HENT: ear canals and TM's normal, Nasal mucosa erythematous with light yellow drainage noted.  Oral mucosa pink and  moist.  RESP: lungs clear to auscultation - no rales, rhonchi or wheezes  CV: regular rate and rhythm  PSYCH: mentation appears normal, affect normal/bright          Assessment & Plan     Acute non-recurrent maxillary sinusitis  Will go ahead and treat with antibiotics given that our standard allergy treatments have not worked for her, which I suspected to be the cause over infection.  Follow up as needed after completion of antibiotics.  - doxycycline hyclate (VIBRAMYCIN) 100 MG capsule; Take 1 capsule (100 mg) by mouth 2 times daily for 10 days        Patient Instructions     Patient Education     Patient Education    Distilled Water, Docusate Sodium, Methylparaben, Poloxamer 182, Propylene Glycol, Propylparaben, Salicylic Acid, Sodium Hydroxide, tetrasodium EDTA Topical pad, cleanser, Doxycycline Monohydrate Oral capsule    Distilled Water, Docusate Sodium, Methylparaben, Poloxamer 182, Propylene Glycol, Propylparaben, Salicylic Acid, Sodium Hydroxide, tetrasodium EDTA Topical pad, cleanser, Doxycycline Monohydrate Oral tablet    Doxycycline Calcium Oral suspension    Doxycycline Hyclate Gastro-resistant tablet    Doxycycline Hyclate Oral capsule    Doxycycline Hyclate Oral capsule [Periodontitis]    Doxycycline Hyclate Oral capsule, gastro-resistant    Doxycycline Hyclate Oral capsule, gastro-resistant pellets    Doxycycline Hyclate Oral capsule, gastro-resistant sprinkles    Doxycycline Hyclate Oral tablet    Doxycycline Hyclate Oral tablet [Periodontitis]    Doxycycline Hyclate Solution for injection    Doxycycline Monohydrate Oral capsule    Doxycycline Monohydrate Oral capsule, biphasic release    Doxycycline Monohydrate Oral suspension    Doxycycline Monohydrate Oral tablet  Doxycycline Hyclate Oral capsule  What is this medicine?  DOXYCYCLINE (dox maik nichols) is a tetracycline antibiotic. It kills certain bacteria or stops their growth. It is used to treat many kinds of infections, like dental, skin,  respiratory, and urinary tract infections. It also treats acne, Lyme disease, malaria, and certain sexually transmitted infections.  This medicine may be used for other purposes; ask your health care provider or pharmacist if you have questions.  What should I tell my health care provider before I take this medicine?  They need to know if you have any of these conditions:    liver disease    long exposure to sunlight like working outdoors    stomach problems like colitis    an unusual or allergic reaction to doxycycline, tetracycline antibiotics, other medicines, foods, dyes, or preservatives    pregnant or trying to get pregnant    breast-feeding  How should I use this medicine?  Take this medicine by mouth with a full glass of water. Follow the directions on the prescription label. It is best to take this medicine without food, but if it upsets your stomach take it with food. Take your medicine at regular intervals. Do not take your medicine more often than directed. Take all of your medicine as directed even if you think you are better. Do not skip doses or stop your medicine early.  Talk to your pediatrician regarding the use of this medicine in children. Special care may be needed. While this drug may be prescribed for children as young as 8 years old for selected conditions, precautions do apply.  Overdosage: If you think you have taken too much of this medicine contact a poison control center or emergency room at once.  NOTE: This medicine is only for you. Do not share this medicine with others.  What if I miss a dose?  If you miss a dose, take it as soon as you can. If it is almost time for your next dose, take only that dose. Do not take double or extra doses.  What may interact with this medicine?    antacids    barbiturates    birth control pills    bismuth subsalicylate    carbamazepine    methoxyflurane    other antibiotics    phenytoin    vitamins that contain iron    warfarin  This list may not  describe all possible interactions. Give your health care provider a list of all the medicines, herbs, non-prescription drugs, or dietary supplements you use. Also tell them if you smoke, drink alcohol, or use illegal drugs. Some items may interact with your medicine.  What should I watch for while using this medicine?  Tell your doctor or health care professional if your symptoms do not improve.  Do not treat diarrhea with over the counter products. Contact your doctor if you have diarrhea that lasts more than 2 days or if it is severe and watery.  Do not take this medicine just before going to bed. It may not dissolve properly when you lay down and can cause pain in your throat. Drink plenty of fluids while taking this medicine to also help reduce irritation in your throat.  This medicine can make you more sensitive to the sun. Keep out of the sun. If you cannot avoid being in the sun, wear protective clothing and use sunscreen. Do not use sun lamps or tanning beds/booths.  Birth control pills may not work properly while you are taking this medicine. Talk to your doctor about using an extra method of birth control.  If you are being treated for a sexually transmitted infection, avoid sexual contact until you have finished your treatment. Your sexual partner may also need treatment.  Avoid antacids, aluminum, calcium, magnesium, and iron products for 4 hours before and 2 hours after taking a dose of this medicine.  If you are using this medicine to prevent malaria, you should still protect yourself from contact with mosquitos. Stay in screened-in areas, use mosquito nets, keep your body covered, and use an insect repellent.  What side effects may I notice from receiving this medicine?  Side effects that you should report to your doctor or health care professional as soon as possible:    allergic reactions like skin rash, itching or hives, swelling of the face, lips, or tongue    difficulty  breathing    fever    itching in the rectal or genital area    pain on swallowing    redness, blistering, peeling or loosening of the skin, including inside the mouth    severe stomach pain or cramps    unusual bleeding or bruising    unusually weak or tired    yellowing of the eyes or skin  Side effects that usually do not require medical attention (report to your doctor or health care professional if they continue or are bothersome):    diarrhea    loss of appetite    nausea, vomiting  This list may not describe all possible side effects. Call your doctor for medical advice about side effects. You may report side effects to FDA at 3-929-RPR-1038.  Where should I keep my medicine?  Keep out of the reach of children.  Store at room temperature, below 30 degrees C (86 degrees F). Protect from light. Keep container tightly closed. Throw away any unused medicine after the expiration date. Taking this medicine after the expiration date can make you seriously ill.  NOTE:This sheet is a summary. It may not cover all possible information. If you have questions about this medicine, talk to your doctor, pharmacist, or health care provider. Copyright  2016 Gold Standard               Return if symptoms worsen or fail to improve.    Jocy Gimenez MD  [facility]

## 2020-10-24 DIAGNOSIS — R51.9 SINUS HEADACHE: ICD-10-CM

## 2020-10-26 RX ORDER — MOMETASONE FUROATE MONOHYDRATE 50 UG/1
2 SPRAY, METERED NASAL DAILY
Qty: 17 G | Refills: 1 | Status: SHIPPED | OUTPATIENT
Start: 2020-10-26 | End: 2021-11-03

## 2020-10-26 NOTE — TELEPHONE ENCOUNTER
Prescription approved per [facility] Refill Protocol.  Mary Beth Izquierdo RN, BSN

## 2020-11-02 DIAGNOSIS — G43.909 MIGRAINE WITHOUT STATUS MIGRAINOSUS, NOT INTRACTABLE, UNSPECIFIED MIGRAINE TYPE: ICD-10-CM

## 2020-11-02 RX ORDER — IBUPROFEN 800 MG/1
800 TABLET, FILM COATED ORAL EVERY 8 HOURS PRN
Qty: 90 TABLET | Refills: 0 | Status: SHIPPED | OUTPATIENT
Start: 2020-11-02 | End: 2021-05-07

## 2020-11-02 RX ORDER — IBUPROFEN 800 MG/1
800 TABLET, FILM COATED ORAL EVERY 8 HOURS PRN
Qty: 90 TABLET | Refills: 0 | Status: SHIPPED | OUTPATIENT
Start: 2020-11-02 | End: 2020-11-02

## 2020-11-02 NOTE — TELEPHONE ENCOUNTER
Routing refill request to provider for review/approval because:  Labs not current:  CBC    Katia refill sent, please place lab orders if desired and route to care team to schedule lab only appt.    Clotilde TRIPP RN, BSN

## 2020-11-02 NOTE — TELEPHONE ENCOUNTER
Medication Question or Refill    Who is calling: Patient     What medication are you calling about (include dose and sig)?: ibuprofen (ADVIL/MOTRIN) 800 MG tablet    Controlled Substance Agreement on file: No    Who prescribed the medication?: Dr Wesley    Do you need a refill? Yes:     When did you use the medication last? na    Patient offered an appointment? No    Do you have any questions or concerns?  No    Requested Pharmacy: Mary Bernstein to leave a detailed message?: Yes at Cell number on file:    Telephone Information:   Mobile [phone]

## 2020-11-25 ENCOUNTER — OFFICE VISIT (OUTPATIENT)
Dept: ORTHOPEDICS | Facility: CLINIC | Age: 45
End: 2020-11-25
Payer: COMMERCIAL

## 2020-11-25 ENCOUNTER — ANCILLARY PROCEDURE (OUTPATIENT)
Dept: GENERAL RADIOLOGY | Facility: CLINIC | Age: 45
End: 2020-11-25
Attending: FAMILY MEDICINE
Payer: COMMERCIAL

## 2020-11-25 VITALS
HEIGHT: 64 IN | BODY MASS INDEX: 22.02 KG/M2 | SYSTOLIC BLOOD PRESSURE: 136 MMHG | DIASTOLIC BLOOD PRESSURE: 70 MMHG | WEIGHT: 129 LBS

## 2020-11-25 DIAGNOSIS — M25.519 PAIN IN JOINT, SHOULDER REGION: ICD-10-CM

## 2020-11-25 DIAGNOSIS — S46.011A STRAIN OF RIGHT ROTATOR CUFF CAPSULE, INITIAL ENCOUNTER: ICD-10-CM

## 2020-11-25 DIAGNOSIS — M25.511 PAIN IN JOINT OF RIGHT SHOULDER: Primary | ICD-10-CM

## 2020-11-25 PROCEDURE — 73030 X-RAY EXAM OF SHOULDER: CPT | Mod: RT | Performed by: RADIOLOGY

## 2020-11-25 PROCEDURE — 99214 OFFICE O/P EST MOD 30 MIN: CPT | Performed by: FAMILY MEDICINE

## 2020-11-25 RX ORDER — CELECOXIB 200 MG/1
200 CAPSULE ORAL DAILY PRN
Qty: 30 CAPSULE | Refills: 2 | Status: SHIPPED | OUTPATIENT
Start: 2020-11-25 | End: 2021-03-01

## 2020-11-25 ASSESSMENT — MIFFLIN-ST. JEOR: SCORE: 1224.11
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
## 2020-12-01 ENCOUNTER — THERAPY VISIT (OUTPATIENT)
Dept: PHYSICAL THERAPY | Facility: CLINIC | Age: 45
End: 2020-12-01
Attending: FAMILY MEDICINE
Payer: COMMERCIAL

## 2020-12-01 DIAGNOSIS — M25.511 PAIN IN JOINT OF RIGHT SHOULDER: ICD-10-CM

## 2020-12-01 DIAGNOSIS — S46.011A STRAIN OF RIGHT ROTATOR CUFF CAPSULE, INITIAL ENCOUNTER: ICD-10-CM

## 2020-12-01 PROCEDURE — 97161 PT EVAL LOW COMPLEX 20 MIN: CPT | Mod: GP | Performed by: PHYSICAL THERAPIST

## 2020-12-01 PROCEDURE — T1013 SIGN LANG/ORAL INTERPRETER: HCPCS | Mod: U3 | Performed by: PHYSICAL THERAPIST

## 2020-12-01 PROCEDURE — 97110 THERAPEUTIC EXERCISES: CPT | Mod: GP | Performed by: PHYSICAL THERAPIST
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
## 2020-12-02 ENCOUNTER — TELEPHONE (OUTPATIENT)
Dept: ORTHOPEDICS | Facility: CLINIC | Age: 45
End: 2020-12-02

## 2020-12-02 PROBLEM — M25.511 PAIN IN JOINT OF RIGHT SHOULDER: Status: ACTIVE | Noted: 2020-12-02
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
## 2020-12-02 NOTE — TELEPHONE ENCOUNTER
Interpreted voicemail left regarding medical massage therapy for Rt shoulder. Please call to discuss.  [phone]

## 2020-12-08 ENCOUNTER — OFFICE VISIT (OUTPATIENT)
Dept: ORTHOPEDICS | Facility: CLINIC | Age: 45
End: 2020-12-08
Payer: COMMERCIAL

## 2020-12-08 VITALS
WEIGHT: 129 LBS | BODY MASS INDEX: 22.02 KG/M2 | HEIGHT: 64 IN | DIASTOLIC BLOOD PRESSURE: 76 MMHG | SYSTOLIC BLOOD PRESSURE: 128 MMHG

## 2020-12-08 DIAGNOSIS — S46.911A MUSCLE STRAIN OF RIGHT SCAPULAR REGION, INITIAL ENCOUNTER: Primary | ICD-10-CM

## 2020-12-08 DIAGNOSIS — S46.011A STRAIN OF RIGHT ROTATOR CUFF CAPSULE, INITIAL ENCOUNTER: ICD-10-CM

## 2020-12-08 PROCEDURE — 99214 OFFICE O/P EST MOD 30 MIN: CPT | Performed by: FAMILY MEDICINE

## 2020-12-08 RX ORDER — TIZANIDINE 2 MG/1
4 TABLET ORAL 3 TIMES DAILY PRN
Qty: 60 TABLET | Refills: 0 | Status: SHIPPED | OUTPATIENT
Start: 2020-12-08 | End: 2021-06-15

## 2020-12-08 ASSESSMENT — MIFFLIN-ST. JEOR: SCORE: 1219.11

## 2020-12-08 NOTE — PATIENT INSTRUCTIONS
1. Muscle strain of right scapular region, initial encounter    2. Strain of right rotator cuff capsule, initial encounter      -Patient is following for right shoulder pain due to a rotator cuff and scapular muscle strain  -Patient noticed worsening pain in physical therapy and so we will stop at this time  -Physical therapy recommended massage therapy and the patient is interested in it since she cannot tolerate exercises at this time.  Patient will start massage therapy to address the tight muscles  -Was unable to tolerate Celebrex.  Patient will continue with ibuprofen as needed.  Patient will start tizanidine 2 to 4 mg as needed for muscle spasms.  -Patient will follow up if pain does not improve  -Call direct clinic number [[phone]] at any time with questions or concerns.    Albert Yeo MD [facility] Orthopedics and Sports Medicine  [facility] [place]
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
## 2021-01-19 ENCOUNTER — TELEPHONE (OUTPATIENT)
Dept: FAMILY MEDICINE | Facility: CLINIC | Age: 46
End: 2021-01-19

## 2021-01-19 NOTE — TELEPHONE ENCOUNTER
Please advise if ok for a virtual visit to discuss increased dose of imitrex   Mary Beth Izquierdo RN, BSN

## 2021-01-19 NOTE — TELEPHONE ENCOUNTER
Reason for Call:  Medication or medication refill:    Do you use a [facility]? [place] of the pharmacy and phone number for the current request:  Mary  [address] [place], [place]  Name of the medication requested: Imitrex    Other request: Patient would like a higher dose.     Can we leave a detailed message on this number? YES    Phone number patient can be reached at: Home number on file [phone] (home)    Best Time: Anytime    Call taken on 1/19/2021 at 3:48 PM by Raisa Fatima

## 2021-01-20 DIAGNOSIS — G43.909 MIGRAINE WITHOUT STATUS MIGRAINOSUS, NOT INTRACTABLE, UNSPECIFIED MIGRAINE TYPE: ICD-10-CM

## 2021-01-21 RX ORDER — SUMATRIPTAN 50 MG/1
TABLET, FILM COATED ORAL
Qty: 9 TABLET | Refills: 5 | Status: SHIPPED | OUTPATIENT
Start: 2021-01-21 | End: 2021-06-28

## 2021-01-21 NOTE — TELEPHONE ENCOUNTER
Sumatriptan 50 mg tablets    Summary: TAKE 1 TABLET BY MOUTH AT ONSET OF HEADACHE. MAY REPEAT DOSE IN 2HRS.DO NOT EXCEED 4TABS IN 24HRS, Disp-9 tablet,R-5, No Print Out   Start: 5/11/2020  Ord/Sold: 5/11/2020

## 2021-01-22 ENCOUNTER — PATIENT OUTREACH (OUTPATIENT)
Dept: FAMILY MEDICINE | Facility: CLINIC | Age: 46
End: 2021-01-22

## 2021-01-22 DIAGNOSIS — K64.4 EXTERNAL HEMORRHOIDS: ICD-10-CM

## 2021-01-22 NOTE — TELEPHONE ENCOUNTER
Routing refill request to provider for review/approval because:  Drug not on the [facility] refill protocol     Julia Reza RN

## 2021-01-22 NOTE — TELEPHONE ENCOUNTER
proctozone      Last Written Prescription Date:  3/18/20  Last Fill Quantity: 70g,   # refills: 11  Last Office Visit: 2/10/20  Future Office visit:    Next 5 appointments (look out 90 days)    Mar 01, 2021  8:00 AM  PHYSICAL with Krystin Sen DO  [facility] ([facility]) [address] [place] [id number] [phone]

## 2021-01-26 RX ORDER — HYDROCORTISONE 2.5 %
CREAM (GRAM) TOPICAL 2 TIMES DAILY
Qty: 70 G | Refills: 11 | Status: SHIPPED | OUTPATIENT
Start: 2021-01-26 | End: 2022-04-19

## 2021-02-22 NOTE — TELEPHONE ENCOUNTER
LMTCB to make a telephone visit with provider to discuss increase in Imitrex.     Used  by calling [phone]    Domitila Benton RN Flex

## 2021-03-01 ENCOUNTER — OFFICE VISIT (OUTPATIENT)
Dept: OBGYN | Facility: CLINIC | Age: 46
End: 2021-03-01
Payer: MEDICARE

## 2021-03-01 VITALS
WEIGHT: 133.5 LBS | SYSTOLIC BLOOD PRESSURE: 122 MMHG | BODY MASS INDEX: 22.79 KG/M2 | HEIGHT: 64 IN | DIASTOLIC BLOOD PRESSURE: 78 MMHG

## 2021-03-01 DIAGNOSIS — Z00.00 ENCOUNTER FOR PREVENTATIVE ADULT HEALTH CARE EXAMINATION: Primary | ICD-10-CM

## 2021-03-01 DIAGNOSIS — Z12.4 ENCOUNTER FOR SCREENING FOR MALIGNANT NEOPLASM OF CERVIX: ICD-10-CM

## 2021-03-01 DIAGNOSIS — B96.89 BACTERIAL VAGINOSIS: ICD-10-CM

## 2021-03-01 DIAGNOSIS — N76.0 BACTERIAL VAGINOSIS: ICD-10-CM

## 2021-03-01 LAB
ALBUMIN SERPL-MCNC: 3.6 G/DL (ref 3.4–5)
ALP SERPL-CCNC: 45 U/L (ref 40–150)
ALT SERPL W P-5'-P-CCNC: 29 U/L (ref 0–50)
ANION GAP SERPL CALCULATED.3IONS-SCNC: 4 MMOL/L (ref 3–14)
AST SERPL W P-5'-P-CCNC: 21 U/L (ref 0–45)
BILIRUB SERPL-MCNC: 0.3 MG/DL (ref 0.2–1.3)
BUN SERPL-MCNC: 11 MG/DL (ref 7–30)
CALCIUM SERPL-MCNC: 9 MG/DL (ref 8.5–10.1)
CHLORIDE SERPL-SCNC: 107 MMOL/L (ref 94–109)
CHOLEST SERPL-MCNC: 196 MG/DL
CO2 SERPL-SCNC: 29 MMOL/L (ref 20–32)
CREAT SERPL-MCNC: 0.78 MG/DL (ref 0.52–1.04)
ERYTHROCYTE [DISTWIDTH] IN BLOOD BY AUTOMATED COUNT: 12.8 % (ref 10–15)
GFR SERPL CREATININE-BSD FRML MDRD: >90 ML/MIN/{1.73_M2}
GLUCOSE SERPL-MCNC: 92 MG/DL (ref 70–99)
HCT VFR BLD AUTO: 41.4 % (ref 35–47)
HDLC SERPL-MCNC: 58 MG/DL
HGB BLD-MCNC: 13.8 G/DL (ref 11.7–15.7)
LDLC SERPL CALC-MCNC: 113 MG/DL
MCH RBC QN AUTO: 29.7 PG (ref 26.5–33)
MCHC RBC AUTO-ENTMCNC: 33.3 G/DL (ref 31.5–36.5)
MCV RBC AUTO: 89 FL (ref 78–100)
NONHDLC SERPL-MCNC: 138 MG/DL
PLATELET # BLD AUTO: 184 10E9/L (ref 150–450)
POTASSIUM SERPL-SCNC: 3.6 MMOL/L (ref 3.4–5.3)
PROT SERPL-MCNC: 7.1 G/DL (ref 6.8–8.8)
RBC # BLD AUTO: 4.65 10E12/L (ref 3.8–5.2)
SODIUM SERPL-SCNC: 140 MMOL/L (ref 133–144)
SPECIMEN SOURCE: ABNORMAL
TRIGL SERPL-MCNC: 123 MG/DL
TSH SERPL DL<=0.005 MIU/L-ACNC: 1.01 MU/L (ref 0.4–4)
WBC # BLD AUTO: 4.9 10E9/L (ref 4–11)
WET PREP SPEC: ABNORMAL

## 2021-03-01 PROCEDURE — T1013 SIGN LANG/ORAL INTERPRETER: HCPCS | Mod: U3 | Performed by: FAMILY MEDICINE

## 2021-03-01 PROCEDURE — 80061 LIPID PANEL: CPT | Performed by: FAMILY MEDICINE

## 2021-03-01 PROCEDURE — 84443 ASSAY THYROID STIM HORMONE: CPT | Performed by: FAMILY MEDICINE

## 2021-03-01 PROCEDURE — 87624 HPV HI-RISK TYP POOLED RSLT: CPT | Performed by: FAMILY MEDICINE

## 2021-03-01 PROCEDURE — 99396 PREV VISIT EST AGE 40-64: CPT | Performed by: FAMILY MEDICINE

## 2021-03-01 PROCEDURE — 87210 SMEAR WET MOUNT SALINE/INK: CPT | Performed by: FAMILY MEDICINE

## 2021-03-01 PROCEDURE — G0145 SCR C/V CYTO,THINLAYER,RESCR: HCPCS | Performed by: FAMILY MEDICINE

## 2021-03-01 PROCEDURE — 85027 COMPLETE CBC AUTOMATED: CPT | Performed by: FAMILY MEDICINE

## 2021-03-01 PROCEDURE — 36415 COLL VENOUS BLD VENIPUNCTURE: CPT | Performed by: FAMILY MEDICINE

## 2021-03-01 PROCEDURE — 80053 COMPREHEN METABOLIC PANEL: CPT | Performed by: FAMILY MEDICINE

## 2021-03-01 PROCEDURE — G0101 CA SCREEN;PELVIC/BREAST EXAM: HCPCS | Performed by: FAMILY MEDICINE

## 2021-03-01 RX ORDER — METRONIDAZOLE 500 MG/1
500 TABLET ORAL 2 TIMES DAILY
Qty: 14 TABLET | Refills: 0 | Status: SHIPPED | OUTPATIENT
Start: 2021-03-01 | End: 2021-03-03

## 2021-03-01 ASSESSMENT — MIFFLIN-ST. JEOR: SCORE: 1239.52

## 2021-03-01 NOTE — NURSING NOTE
"Chief Complaint   Patient presents with     Gyn Exam     pap due---discuss ajit-menopause--pt will schedule mammogram       Initial /78   Ht 1.632 m (5' 4.25\")   Wt 60.6 kg (133 lb 8 oz)   LMP 02/20/2021 (Exact Date)   BMI 22.74 kg/m   Estimated body mass index is 22.74 kg/m  as calculated from the following:    Height as of this encounter: 1.632 m (5' 4.25\").    Weight as of this encounter: 60.6 kg (133 lb 8 oz).  BP completed using cuff size: regular    Questioned patient about current smoking habits.  Pt. has never smoked.      No obstetric history on file.    The following HM Due: pap smear           "

## 2021-03-01 NOTE — PROGRESS NOTES
SUBJECTIVE:  Sammie Ramirez is an 45 year old  woman who presents for   annual gyn exam. Patient's last menstrual period was 2021 (exact date). Periods are regular q 28-30 days, lasting 5 days. Dysmenorrhea:mild, occurring premenstrually and first 1-2 days of flow. Cyclic symptoms   include none. No intermenstrual bleeding,   spotting, or discharge.  Menarche age teen.  STD hx: none.  Having hot flushes, 15 minutes at a time, doesn't know frequency because she hasn't tracked the hot flushes.   Doesn't seem to follow a pattern.     Current contraception: none  BINA exposure: no  History of abnormal Pap smear: Yes: hx of LEEP, now normal   Family history of uterine or ovarian cancer: No  Regular self breast exam: Yes  History of abnormal mammogram: No  Family history of breast cancer: No  History of abnormal lipids: No    Past Medical History:   Diagnosis Date     ASCUS (atypical squamous cells of undetermined significance) on Pap smear 10/13, 2016    Neg HPV     LSIL (low grade squamous intraepithelial lesion) on Pap smear 2014        Family History   Problem Relation Age of Onset     Family History Negative Mother      Family History Negative Father        Past Surgical History:   Procedure Laterality Date     LEEP TX, CERVICAL  x 2 per chart    historical       Current Outpatient Medications   Medication     diclofenac (VOLTAREN) 75 MG EC tablet     fexofenadine (ALLEGRA) 180 MG tablet     hydrocortisone 2.5 % cream     ibuprofen (ADVIL/MOTRIN) 800 MG tablet     mometasone (NASONEX) 50 MCG/ACT nasal spray     omeprazole (PRILOSEC) 40 MG DR capsule     ondansetron (ZOFRAN ODT) 4 MG ODT tab     ondansetron (ZOFRAN) 4 MG tablet     order for DME     SUMAtriptan (IMITREX) 50 MG tablet     tiZANidine (ZANAFLEX) 2 MG tablet     triamcinolone (KENALOG) 0.1 % external cream     No current facility-administered medications for this visit.      Allergies   Allergen Reactions     Amoxicillin      Rash, hives      "Codeine Sulfate      Lactose      Seasonal Allergies        Social History     Tobacco Use     Smoking status: Never Smoker     Smokeless tobacco: Never Used   Substance Use Topics     Alcohol use: Yes     Comment: wine       Review Of Systems  Ears/Nose/Throat: negative  Respiratory: No shortness of breath, dyspnea on exertion, cough, or hemoptysis  Cardiovascular: negative  Gastrointestinal: negative  Genitourinary: negative  Constitutional, HEENT, cardiovascular, pulmonary, GI, , musculoskeletal, neuro, skin, endocrine and psych systems are negative, except as otherwise noted.      OBJECTIVE:  /78   Ht 1.632 m (5' 4.25\")   Wt 60.6 kg (133 lb 8 oz)   LMP 2021 (Exact Date)   BMI 22.74 kg/m      General appearance: healthy, alert and no distress  Skin: Skin color, texture, turgor normal. No rashes or lesions.  Ears: negative  Nose/Sinuses: Nares normal. Septum midline. Mucosa normal. No drainage or sinus tenderness.  Oropharynx: Lips, mucosa, and tongue normal. Teeth and gums normal.  Neck: Neck supple. No adenopathy. Thyroid symmetric, normal size,, Carotids without bruits.  Lungs: negative, Percussion normal. Good diaphragmatic excursion. Lungs clear  Heart: negative, PMI normal. No lifts, heaves, or thrills. RRR. No murmurs, clicks gallops or rub  Breasts: Inspection negative. No nipple discharge or bleeding. No masses.  Abdomen: Abdomen soft, non-tender. BS normal. No masses, organomegaly  Pelvic: Pelvic:  Pelvic examination with pap/no Gonorrhea and Chlamydia   including  External genitalia normal   and vagina normal rugatted not atrophic  Examination of urethra  normal no masses, tenderness, scarring  bladder, no masses or tenderness  Cervix no lesions or discharge  Bimanual exam with   Uterus 6 weeks size, mid position, mobile,non-tenderness, no descent   Adnexa/parametria  No masses           ASSESSMENT:  Sammie Ramirez is an 45 year old  woman who presents for   annual gyn exam. " Concerns:  None     PLAN:  Dx:  1)  Pap smear  2)  Mammography, lipids at appropriate intervals   3)  Hot flushes: discussed Oral Contraceptive continuous vs anti-depressant. Discussed dietary changes   Such as reducing refined sugar.     PE:  Reviewed health maintenance including diet, regular exercise   and periodic exams.    Dr. Krystin Sen, DO    Obstetrics and Gynecology  [facility] [place] [facility] [place]

## 2021-03-01 NOTE — PATIENT INSTRUCTIONS
Labs today and return yearly     Dr. Krystin Sen, DO    Obstetrics and Gynecology  [facility] [place] [facility] [place]

## 2021-03-03 ENCOUNTER — VIRTUAL VISIT (OUTPATIENT)
Dept: FAMILY MEDICINE | Facility: CLINIC | Age: 46
End: 2021-03-03
Payer: MEDICARE

## 2021-03-03 ENCOUNTER — TELEPHONE (OUTPATIENT)
Dept: FAMILY MEDICINE | Facility: CLINIC | Age: 46
End: 2021-03-03

## 2021-03-03 DIAGNOSIS — J01.00 ACUTE MAXILLARY SINUSITIS, RECURRENCE NOT SPECIFIED: ICD-10-CM

## 2021-03-03 DIAGNOSIS — R51.9 SINUS HEADACHE: Primary | ICD-10-CM

## 2021-03-03 DIAGNOSIS — G43.809 OTHER MIGRAINE WITHOUT STATUS MIGRAINOSUS, NOT INTRACTABLE: ICD-10-CM

## 2021-03-03 LAB
COPATH REPORT: NORMAL
PAP: NORMAL

## 2021-03-03 PROCEDURE — 99443 PR PHYSICIAN TELEPHONE EVALUATION 21-30 MIN: CPT | Mod: 95 | Performed by: FAMILY MEDICINE

## 2021-03-03 RX ORDER — CETIRIZINE HYDROCHLORIDE 10 MG/1
10 TABLET ORAL DAILY
COMMUNITY
End: 2021-09-27

## 2021-03-03 RX ORDER — DOXYCYCLINE HYCLATE 100 MG
100 TABLET ORAL 2 TIMES DAILY
Qty: 20 TABLET | Refills: 0 | Status: SHIPPED | OUTPATIENT
Start: 2021-03-03 | End: 2021-03-05

## 2021-03-03 NOTE — TELEPHONE ENCOUNTER
LVMRTC- please help patient make the following appointment. Orin Woodard     Visit Disposition    Check-out Note   Please schedule in clinic appt - approval req ok (needs )

## 2021-03-03 NOTE — PROGRESS NOTES
Sammie is a 45 year old who is being evaluated via a billable telephone visit.      What phone number would you like to be contacted at? [phone]  How would you like to obtain your AVS? Mail a copy    Assessment & Plan     Sinus headache  Treat for possible sinus infection with antibiotics.  Continue antihistamine and nasal steroid.  Follow-up if not improving.    Acute maxillary sinusitis, recurrence not specified  - doxycycline hyclate (VIBRA-TABS) 100 MG tablet; Take 1 tablet (100 mg) by mouth 2 times daily for 10 days    Other migraine without status migrainosus, not intractable  Discussed briefly but difficulty with phone visit and  with ASL interpretation to fully discuss.  Discussed follow-up in clinic and patient agrees.                   No follow-ups on file.    Walter Wesley MD  [facility]   Sammie is a 45 year old who presents for the following health issues     HPI       Patient with frontal headache over the past month.  Has facial pressure and nasal drainage.  History of sinus infection.  Denies bloody drainage.  No fever.    History of seasonal allergic rhinitis. She would like a refill of the Nasonex.     History of migraine headaches, occurring once per month. She continues to use the Imitrex as needed for abortive therapy.    Review of Systems         Objective         Vitals:  No vitals were obtained today due to virtual visit.    Physical Exam   Phone visit done with  with                 Phone call duration: 21 minutes

## 2021-03-04 LAB
FINAL DIAGNOSIS: ABNORMAL
HPV HR 12 DNA CVX QL NAA+PROBE: POSITIVE
HPV16 DNA SPEC QL NAA+PROBE: NEGATIVE
HPV18 DNA SPEC QL NAA+PROBE: NEGATIVE
SPECIMEN DESCRIPTION: ABNORMAL
SPECIMEN SOURCE CVX/VAG CYTO: ABNORMAL

## 2021-03-05 ENCOUNTER — OFFICE VISIT (OUTPATIENT)
Dept: FAMILY MEDICINE | Facility: CLINIC | Age: 46
End: 2021-03-05
Payer: MEDICARE

## 2021-03-05 VITALS
TEMPERATURE: 98.7 F | WEIGHT: 133 LBS | DIASTOLIC BLOOD PRESSURE: 78 MMHG | HEART RATE: 71 BPM | SYSTOLIC BLOOD PRESSURE: 120 MMHG | OXYGEN SATURATION: 98 % | RESPIRATION RATE: 16 BRPM | BODY MASS INDEX: 22.71 KG/M2 | HEIGHT: 64 IN

## 2021-03-05 DIAGNOSIS — J01.00 ACUTE MAXILLARY SINUSITIS, RECURRENCE NOT SPECIFIED: ICD-10-CM

## 2021-03-05 DIAGNOSIS — B37.31 YEAST INFECTION OF THE VAGINA: ICD-10-CM

## 2021-03-05 DIAGNOSIS — R11.2 NON-INTRACTABLE VOMITING WITH NAUSEA, UNSPECIFIED VOMITING TYPE: ICD-10-CM

## 2021-03-05 DIAGNOSIS — G43.809 OTHER MIGRAINE WITHOUT STATUS MIGRAINOSUS, NOT INTRACTABLE: Primary | ICD-10-CM

## 2021-03-05 DIAGNOSIS — J30.2 SEASONAL ALLERGIC RHINITIS, UNSPECIFIED TRIGGER: ICD-10-CM

## 2021-03-05 DIAGNOSIS — R51.9 SINUS HEADACHE: ICD-10-CM

## 2021-03-05 DIAGNOSIS — K59.00 CONSTIPATION, UNSPECIFIED CONSTIPATION TYPE: ICD-10-CM

## 2021-03-05 PROCEDURE — 99214 OFFICE O/P EST MOD 30 MIN: CPT | Performed by: PHYSICIAN ASSISTANT

## 2021-03-05 RX ORDER — POLYETHYLENE GLYCOL 3350 17 G/17G
1 POWDER, FOR SOLUTION ORAL DAILY
Qty: 500 G | Refills: 0 | Status: SHIPPED | OUTPATIENT
Start: 2021-03-05

## 2021-03-05 RX ORDER — ONDANSETRON 8 MG/1
8 TABLET, FILM COATED ORAL EVERY 8 HOURS PRN
Qty: 20 TABLET | Refills: 1 | Status: SHIPPED | OUTPATIENT
Start: 2021-03-05 | End: 2021-09-27

## 2021-03-05 RX ORDER — FLUCONAZOLE 150 MG/1
150 TABLET ORAL ONCE
Qty: 1 TABLET | Refills: 0 | Status: SHIPPED | OUTPATIENT
Start: 2021-03-05 | End: 2021-03-05

## 2021-03-05 RX ORDER — RIZATRIPTAN BENZOATE 5 MG/1
5-10 TABLET ORAL
Qty: 9 TABLET | Refills: 3 | Status: CANCELLED | OUTPATIENT
Start: 2021-03-05

## 2021-03-05 RX ORDER — AZITHROMYCIN 250 MG/1
TABLET, FILM COATED ORAL
Qty: 6 TABLET | Refills: 0 | Status: SHIPPED | OUTPATIENT
Start: 2021-03-05 | End: 2021-03-10

## 2021-03-05 RX ORDER — PROPRANOLOL HYDROCHLORIDE 80 MG/1
80 CAPSULE, EXTENDED RELEASE ORAL DAILY
Qty: 30 CAPSULE | Refills: 1 | Status: CANCELLED | OUTPATIENT
Start: 2021-03-05

## 2021-03-05 ASSESSMENT — MIFFLIN-ST. JEOR: SCORE: 1237.25

## 2021-03-05 NOTE — PROGRESS NOTES
"    Assessment & Plan     Other migraine without status migrainosus, not intractable  Will follow up in 1 week if doesn't improve with sinus medication.  May need to start on preventative medication or change to maxalt.    Sinus headache      Acute maxillary sinusitis, recurrence not specified    - fluconazole (DIFLUCAN) 150 MG tablet; Take 1 tablet (150 mg) by mouth once for 1 dose    Yeast infection of the vagina    - azithromycin (ZITHROMAX) 250 MG tablet; Take 2 tablets (500 mg) by mouth daily for 1 day, THEN 1 tablet (250 mg) daily for 4 days.    Constipation, unspecified constipation type    - polyethylene glycol (MIRALAX) 17 GM/Dose powder; Take 17 g (1 capful) by mouth daily    Non-intractable vomiting with nausea, unspecified vomiting type  Will increase zofran dose.  - ondansetron (ZOFRAN) 8 MG tablet; Take 1 tablet (8 mg) by mouth every 8 hours as needed for nausea    Seasonal allergic rhinitis, unspecified trigger  Would like allergy testing to try and decrease her number of sinus infections.  - ALLERGY/ASTHMA ADULT REFERRAL        Return in about 1 week (around 3/12/2021) for Recheck if not improving.    Lakeisha Albert PA-C  [facility] YANIRA Cochran is a 45 year old who presents for the following health issues  accompanied by her :    Sinus Problem     History of Present Illness       Migraines:   Since the patient's last clinic visit, headaches are: worsened  The patient is getting headaches:  Month on and off  She is able to do normal daily activities when she has a migraine.  The patient is taking the following rescue/relief medications:  Ibuprofen (Advil, Motrin), Tylenol and sumatriptan (Imitrex)   Patient states \"I get some relief\" from the rescue/relief medications.   The patient is taking the following medications to prevent migraines:  No medications to prevent migraines  In the past 4 weeks, the patient has gone to an Urgent Care or " "Emergency Room 0 times times due to headaches.    She eats 2-3 servings of fruits and vegetables daily.She consumes 2 sweetened beverage(s) daily.   She is taking medications regularly.     Additional complaints: None  HPI additional notes: Sammie presents today with   Chief Complaint   Patient presents with     Recheck Medication     Sinus Problem   Had video visit with Dr. Wesley two days ago, was started on doxycycline for sinusitis and wanted to discuss with her changing her migraine medications which was difficult to do over the video call.  Saw Dr. Glass on Monday.  Has not started the doxycycline.      Nausea, anorexia, not eating, had to missed a day of work.  Has needed spring and fall antibiotics for sinusitis.  Has tolerated azithromycin in the past.       Review of Systems   Constitutional, HEENT, cardiovascular, pulmonary, gi and gu systems are negative, except as otherwise noted.      Objective    /78 (BP Location: Right arm, Patient Position: Chair, Cuff Size: Adult Regular)   Pulse 71   Temp 98.7  F (37.1  C) (Oral)   Resp 16   Ht 1.632 m (5' 4.25\")   Wt 60.3 kg (133 lb)   LMP 02/20/2021 (Exact Date)   SpO2 98%   BMI 22.65 kg/m    Body mass index is 22.65 kg/m .  Physical Exam     Physical Exam   GENERAL: healthy, alert, in no acute distress  EYES: Grossly normal to inspection, EOMI, PERRL  HENT: Mucous mebranes moist.  NECK: Non-tender, no adenopathy.  RESP: lungs clear to auscultation - no rales, no rhonchi, no wheezes  CV: regular rate and rhythm, normal S1 S2. No peripheral edema.  ABDOMEN: abdomen soft, mildly distended.  SKIN: no suspicious lesions, no rashes  PSYCH: Alert and oriented times 3;  Able to articulate logical thoughts. Affect is normal.       "

## 2021-03-08 ENCOUNTER — PATIENT OUTREACH (OUTPATIENT)
Dept: FAMILY MEDICINE | Facility: CLINIC | Age: 46
End: 2021-03-08

## 2021-03-08 DIAGNOSIS — Z98.890 S/P LEEP OF CERVIX: ICD-10-CM

## 2021-03-10 ENCOUNTER — TELEPHONE (OUTPATIENT)
Dept: OBGYN | Facility: CLINIC | Age: 46
End: 2021-03-10

## 2021-03-10 NOTE — TELEPHONE ENCOUNTER
Pt called and asked if Dr Camacho could call her back regarding her abnormal pap she just had. Pt has questions as to what happens next.  plz call pt at [phone]

## 2021-03-11 NOTE — TELEPHONE ENCOUNTER
Patient called back with . Recommendations regarding colp reviewed.  Forwarded to scheduling to make appt for colp with KT.  Nina Sanchez RN

## 2021-03-16 ENCOUNTER — TELEPHONE (OUTPATIENT)
Dept: FAMILY MEDICINE | Facility: CLINIC | Age: 46
End: 2021-03-16

## 2021-03-16 NOTE — TELEPHONE ENCOUNTER
Patient called in regarding her medication for Sumatriptan and conversation with Natasha Albert regarding uping the dosage from 50mg to 100mg     Please have someone follow up with Natasha and see if this was the case or if something was changing     Lola Kitchen/

## 2021-03-16 NOTE — TELEPHONE ENCOUNTER
3/5/21: patient was seen for    Other migraine without status migrainosus, not intractable  Sinus headache  Acute maxillary sinusitis, recurrence not specified  Seasonal allergic rhinitis, unspecified trigger      Was to  Return in about 1 week (around 3/12/2021) for Recheck if not improving.    Would you like a visit with patient?     See below she is asking for an increase in migraine medication.     Domitila Benton RN Flex

## 2021-03-19 ENCOUNTER — TRANSFERRED RECORDS (OUTPATIENT)
Dept: HEALTH INFORMATION MANAGEMENT | Facility: CLINIC | Age: 46
End: 2021-03-19

## 2021-05-06 DIAGNOSIS — G43.909 MIGRAINE WITHOUT STATUS MIGRAINOSUS, NOT INTRACTABLE, UNSPECIFIED MIGRAINE TYPE: ICD-10-CM

## 2021-05-07 RX ORDER — IBUPROFEN 800 MG/1
TABLET, FILM COATED ORAL
Qty: 90 TABLET | Refills: 0 | Status: SHIPPED | OUTPATIENT
Start: 2021-05-07 | End: 2021-06-07

## 2021-05-07 NOTE — TELEPHONE ENCOUNTER
Prescription approved per [facility] Refill Protocol.  Mary Beth Izquierdo RN, BSN

## 2021-05-14 NOTE — TELEPHONE ENCOUNTER
Patient called checking the status of the new headache medication and antibiotic. Please review and advise.  Orin Woodard      Pacemaker

## 2021-05-24 ENCOUNTER — OFFICE VISIT (OUTPATIENT)
Dept: OBGYN | Facility: CLINIC | Age: 46
End: 2021-05-24
Payer: MEDICARE

## 2021-05-24 ENCOUNTER — APPOINTMENT (OUTPATIENT)
Dept: FAMILY MEDICINE | Facility: CLINIC | Age: 46
End: 2021-05-24
Payer: MEDICARE

## 2021-05-24 VITALS
BODY MASS INDEX: 23.25 KG/M2 | HEIGHT: 64 IN | DIASTOLIC BLOOD PRESSURE: 64 MMHG | WEIGHT: 136.2 LBS | SYSTOLIC BLOOD PRESSURE: 112 MMHG

## 2021-05-24 DIAGNOSIS — B97.7 HUMAN PAPILLOMA VIRUS (HPV) INFECTION: ICD-10-CM

## 2021-05-24 DIAGNOSIS — Z11.51 SCREENING FOR HUMAN PAPILLOMAVIRUS: Primary | ICD-10-CM

## 2021-05-24 PROCEDURE — 57454 BX/CURETT OF CERVIX W/SCOPE: CPT | Performed by: FAMILY MEDICINE

## 2021-05-24 PROCEDURE — 88305 TISSUE EXAM BY PATHOLOGIST: CPT | Performed by: PATHOLOGY

## 2021-05-24 ASSESSMENT — MIFFLIN-ST. JEOR: SCORE: 1251.77

## 2021-05-24 NOTE — NURSING NOTE
"Chief Complaint   Patient presents with     Colposcopy     HPV positive other       Initial /64   Ht 1.632 m (5' 4.25\")   Wt 61.8 kg (136 lb 3.2 oz)   LMP 2021 (Exact Date)   BMI 23.20 kg/m   Estimated body mass index is 23.2 kg/m  as calculated from the following:    Height as of this encounter: 1.632 m (5' 4.25\").    Weight as of this encounter: 61.8 kg (136 lb 3.2 oz).  BP completed using cuff size: regular    Questioned patient about current smoking habits.  Pt. has never smoked.          The following HM Due: NONE      "

## 2021-05-24 NOTE — PROGRESS NOTES
Sammie Ramirez is a 45 year old para who presents for colposcopy for 03/1/21 NIL, +HR HPV, not 16/18 (current)  She has had prior history:  See below.    GYNECOLOGIC HISTORY   Menarche: teen  Contraception Use: none  Pap:   Hx of LEEP x 2  2/14/12: LSIL  10/3/12: NIL.  Plan pap in 1 yr.  10/2013 ASCUS neg HPV.  Plan pap in 1 yr. Per ASCCP algorithms, needs two negative cotests at 12 & 24 months after LEEP before going to cotest in 3 yrs.  10/2014: NIL pap, neg HPV. Plan cotest pap & HPV in 1 year. Tracking started.  2/9/2016 Ascus Neg HPV, plan: cotest one year per MD. Tracking.   05/01/17: NIL pap, Neg HR HPV result. Plan cotest in 1 year per provider.  5/15/18 Atypical Endocervical cells, Neg HPV. Plan colp and endometrial sampling per guidelines.   06/14/18: [place] bx Suggestive of LSIL ROBERT-1 and Cervicitis. ECC Neg for dyplasia. EMB neg for dysplasia. Plan cotest in 1 year.   2/10/20 NIL pap, Neg HPV. Plan cotest in 1 year.    03/1/21 NIL, +HR HPV, not 16/18 (current)    Risks and benefits were explained in detail prior to procedure including bleeding, infection and possible need for further treatment.  Informed consent was obtained to proceed.    TIME OUT DONE PRIOR TO PROCEDURE:   STATING PROCEDURE NAME AND NEGATIVE URINE PREGNANCY TEST        Procedure/Findings:  Patient was placed in dorsal lithotomy position. Speculum was inserted. Gross examination of the cervix revealed no pathology.  Acetic acid and Lugol's solution applied.    1. Colposcopy adequate:  Yes     2. Squamocolumnar junction visibility: completely visible    3. Normal colposcopic findings:      Original squamous epithelium:  mature    Other:  other: no    4. Abnormal colposcopic findings:  yes    Location of lesion:  inside transformation zone at 12,3,9 o'clock    Size of lesion:  3 quadrants,  2% of cervix    Grade 1 (minor):  yes     Details:   fine mosaicism, fine punctation and thin acetowhite epithelium    Grade 2 (major):   no     Details:     no    Non-specific:         Leukoplakia: no        Erosion: no    Lugol's staining:  Non-stained    Suspicious for invasion: no     Details:  no    Miscellaneous findings: no    5.  3 biopsies were obtained at 12,3,9 o'clock.     ECC was performed.     Hemostasis was achieved with monsels.        Procedure course: Patient tolerated procedure well  Assessment:  Normal exam without visible pathology, ROBERT 1, ROBERT 2 and ROBERT 3    Plan:   If no dysplasia, repeat pap in 6/12 months, if CIN1, recommend Repeat pap in 6/12 months or    HPV in 1 year and if CIN2-3 recommend LEEP, Cryo or Repeat pap/colposcopy in 6/12     months        Dr. Krystin Sen, DO    OB/GYN   [facility]      2011 Colposcopic Terminology of the International Federation for Cervical Pathology and Colposcopy  Obstetrics and Gynecology, VOL. 120, NO. 1, JULY 2012

## 2021-05-25 LAB — COPATH REPORT: NORMAL

## 2021-06-06 DIAGNOSIS — G43.909 MIGRAINE WITHOUT STATUS MIGRAINOSUS, NOT INTRACTABLE, UNSPECIFIED MIGRAINE TYPE: ICD-10-CM

## 2021-06-06 DIAGNOSIS — L30.9 DERMATITIS: ICD-10-CM

## 2021-06-07 RX ORDER — IBUPROFEN 800 MG/1
TABLET, FILM COATED ORAL
Qty: 90 TABLET | Refills: 0 | Status: SHIPPED | OUTPATIENT
Start: 2021-06-07 | End: 2022-04-06

## 2021-06-07 RX ORDER — TRIAMCINOLONE ACETONIDE 1 MG/G
CREAM TOPICAL
Qty: 30 G | Refills: 3 | Status: SHIPPED | OUTPATIENT
Start: 2021-06-07 | End: 2022-08-22

## 2021-06-09 ENCOUNTER — TELEPHONE (OUTPATIENT)
Dept: FAMILY MEDICINE | Facility: CLINIC | Age: 46
End: 2021-06-09

## 2021-06-09 DIAGNOSIS — J30.2 SEASONAL ALLERGIC RHINITIS, UNSPECIFIED TRIGGER: Primary | ICD-10-CM

## 2021-06-09 NOTE — TELEPHONE ENCOUNTER
"[facility] pharmacy sends fax,      \"RX for Mometasone 50 mcg spray  is not covered under patient's insurance,    Preferred alternates:  Fluticasone Spray  Flunisolide Spray    Please advise if patient to purchase OTC or if new rx to be sent, new rx will need strength, directions, quantity refill\"    Justin Carreon RN        "

## 2021-06-10 NOTE — TELEPHONE ENCOUNTER
Called patient and left voicemail to return call to clinic using     *see note below*    Justin Carreon RN

## 2021-06-11 NOTE — TELEPHONE ENCOUNTER
Called patient and left voicemail to return call to clinic  *see note below* -- second attempt    Justin Carreon RN

## 2021-06-14 NOTE — TELEPHONE ENCOUNTER
Called and spoke with patient. Patient has tried fluticasone in the past and it did not work and also affected her taste buds. Patient does not recall trying flunisolide in the past. Patient states mometasone has worked wonderfully in the past and would prefer to continue with that. Patient is planning to call insurance company. Patient prefers PCP to be Lakeisha Albert PA-C.     Other concerns: Patient requesting in office visit with  6/21/21 w/ Lakeisha Albert PA-C regarding allergy results and need for approval on allergy shots. Scheduled. Patient also due for tetanus at this time.     Davin RODAS RN

## 2021-06-15 RX ORDER — FLUNISOLIDE 0.25 MG/ML
2 SOLUTION NASAL EVERY 12 HOURS
Qty: 25 ML | Refills: 0 | Status: SHIPPED | OUTPATIENT
Start: 2021-06-15 | End: 2021-11-03

## 2021-06-15 NOTE — TELEPHONE ENCOUNTER
Received call back from patient. Patient is adamant that insurance should have nothing to do with her medication choices. Patient does not want to try flunisolide as she does not want a possible disruption in her health. Mometasone has worked for her and patient does not think she should have to change. Patient plans to call her insurance company to discuss. Awaiting call back.     Davin RODAS RN

## 2021-06-23 ENCOUNTER — PATIENT OUTREACH (OUTPATIENT)
Dept: OBGYN | Facility: CLINIC | Age: 46
End: 2021-06-23
Payer: MEDICARE

## 2021-06-23 DIAGNOSIS — Z98.890 S/P LEEP OF CERVIX: ICD-10-CM

## 2021-06-28 ENCOUNTER — OFFICE VISIT (OUTPATIENT)
Dept: FAMILY MEDICINE | Facility: CLINIC | Age: 46
End: 2021-06-28
Payer: MEDICARE

## 2021-06-28 VITALS
TEMPERATURE: 98 F | RESPIRATION RATE: 12 BRPM | DIASTOLIC BLOOD PRESSURE: 70 MMHG | BODY MASS INDEX: 23.5 KG/M2 | HEART RATE: 73 BPM | SYSTOLIC BLOOD PRESSURE: 120 MMHG | OXYGEN SATURATION: 98 % | WEIGHT: 138 LBS

## 2021-06-28 DIAGNOSIS — Z23 NEED FOR VACCINATION: ICD-10-CM

## 2021-06-28 DIAGNOSIS — Z71.89 ADVANCED CARE PLANNING/COUNSELING DISCUSSION: ICD-10-CM

## 2021-06-28 DIAGNOSIS — I83.813 VARICOSE VEINS OF BOTH LOWER EXTREMITIES WITH PAIN: ICD-10-CM

## 2021-06-28 DIAGNOSIS — Z11.59 NEED FOR HEPATITIS C SCREENING TEST: ICD-10-CM

## 2021-06-28 DIAGNOSIS — Z11.4 SCREENING FOR HIV (HUMAN IMMUNODEFICIENCY VIRUS): ICD-10-CM

## 2021-06-28 DIAGNOSIS — J30.2 SEASONAL ALLERGIC RHINITIS, UNSPECIFIED TRIGGER: Primary | ICD-10-CM

## 2021-06-28 DIAGNOSIS — G43.909 MIGRAINE WITHOUT STATUS MIGRAINOSUS, NOT INTRACTABLE, UNSPECIFIED MIGRAINE TYPE: ICD-10-CM

## 2021-06-28 LAB
HCV AB SERPL QL IA: NONREACTIVE
HIV 1+2 AB+HIV1 P24 AG SERPL QL IA: NONREACTIVE

## 2021-06-28 PROCEDURE — 87389 HIV-1 AG W/HIV-1&-2 AB AG IA: CPT | Performed by: PHYSICIAN ASSISTANT

## 2021-06-28 PROCEDURE — 90715 TDAP VACCINE 7 YRS/> IM: CPT | Performed by: PHYSICIAN ASSISTANT

## 2021-06-28 PROCEDURE — 36415 COLL VENOUS BLD VENIPUNCTURE: CPT | Performed by: PHYSICIAN ASSISTANT

## 2021-06-28 PROCEDURE — 99214 OFFICE O/P EST MOD 30 MIN: CPT | Mod: 25 | Performed by: PHYSICIAN ASSISTANT

## 2021-06-28 PROCEDURE — 86803 HEPATITIS C AB TEST: CPT | Performed by: PHYSICIAN ASSISTANT

## 2021-06-28 PROCEDURE — T1013 SIGN LANG/ORAL INTERPRETER: HCPCS | Mod: U3 | Performed by: PHYSICIAN ASSISTANT

## 2021-06-28 PROCEDURE — 90471 IMMUNIZATION ADMIN: CPT | Performed by: PHYSICIAN ASSISTANT

## 2021-06-28 RX ORDER — SUMATRIPTAN 100 MG/1
TABLET, FILM COATED ORAL
Qty: 9 TABLET | Refills: 3 | Status: SHIPPED | OUTPATIENT
Start: 2021-06-28 | End: 2021-09-27 | Stop reason: SINTOL

## 2021-06-28 RX ORDER — RIZATRIPTAN BENZOATE 5 MG/1
5-10 TABLET ORAL
Qty: 9 TABLET | Refills: 3 | Status: CANCELLED | OUTPATIENT
Start: 2021-06-28

## 2021-06-28 RX ORDER — PROPRANOLOL HYDROCHLORIDE 80 MG/1
80 CAPSULE, EXTENDED RELEASE ORAL DAILY
Qty: 30 CAPSULE | Refills: 1 | Status: SHIPPED | OUTPATIENT
Start: 2021-06-28 | End: 2021-09-27 | Stop reason: SINTOL

## 2021-06-28 NOTE — LETTER
June 29, 2021      Sammie Ramirez  [address] [address]        Dear MsGilbertoJames,    We are writing to inform you of your test results.    Your lab results look stable; everything is normal. Your Hepatitis C Screening test was negative for infection. Your HIV 1 and 2 Antibody test is negative for infection.     Resulted Orders   HIV Antigen Antibody Combo   Result Value Ref Range    HIV Antigen Antibody Combo Nonreactive NR^Nonreactive          Comment:      HIV-1 p24 Ag & HIV-1/HIV-2 Ab Not Detected   **Hepatitis C Screen Reflex to RNA FUTURE anytime   Result Value Ref Range    Hepatitis C Antibody Nonreactive NR^Nonreactive      Comment:      Assay performance characteristics have not been established for newborns,   infants, and children         If you have any questions or concerns, please call the clinic at the number listed above.       Sincerely,      Lakeisha Albert PA-C

## 2021-06-28 NOTE — PROGRESS NOTES
"    Assessment & Plan     Seasonal allergic rhinitis, unspecified trigger  Signed HCE to get records from [facility], pt was seen in April and had lots of allergies.  Thinking getting shots should help her headaches.    Migraine without status migrainosus, not intractable, unspecified migraine type  Increase imitrex.  Start propranolol  - SUMAtriptan (IMITREX) 100 MG tablet; TAKE 1 TAB PO AT ONSET OF HEADACHE. MAY REPEAT DOSE IN 2HRS.DO NOT EXCEED 2 TABS IN 24HRS  - propranolol ER (INDERAL LA) 80 MG 24 hr capsule; Take 1 capsule (80 mg) by mouth daily    Need for vaccination    - TDAP VACCINE (Adacel, Boostrix)  [[id number]]    Advanced care planning/counseling discussion      Screening for HIV (human immunodeficiency virus)    - HIV Antigen Antibody Combo    Need for hepatitis C screening test    - **Hepatitis C Screen Reflex to RNA FUTURE anytime    Varicose veins of both lower extremities with pain  Follow up as referred.  - VASCULAR SURGERY REFERRAL; Future        Will schedule mammogram at [facility].   Declines covid vaccine.      Return in about 1 month (around 7/28/2021) for Med Check.    Lakeisha Albert PA-C  [facility] YANIRA Cochran is a 45 year old who presents for the following health issues     History of Present Illness       Migraines:   Since the patient's last clinic visit, headaches are: no change  The patient is getting headaches:  More often in a month  She is able to do normal daily activities when she has a migraine.  The patient is taking the following rescue/relief medications:  Ibuprofen (Advil, Motrin), Tylenol and sumatriptan (Imitrex)   Patient states \"The relief is inconsistent\" from the rescue/relief medications.   The patient is taking the following medications to prevent migraines:  No medications to prevent migraines  In the past 4 weeks, the patient has gone to an Urgent Care or Emergency Room 0 times times due to headaches.    She eats 2-3 " servings of fruits and vegetables daily.She consumes 2 sweetened beverage(s) daily.She exercises with enough effort to increase her heart rate 30 to 60 minutes per day.    She is taking medications regularly.       Additional complaints: Need to get allergy results back, pap Dr. Rojas pap  HPI additional notes: Sammie presents today with   Chief Complaint   Patient presents with     Headache     related to enviromental allergies     Imm/Inj     Tdap     Recheck Medication     Sumatriptan            Review of Systems   Constitutional, HEENT, cardiovascular, pulmonary, gi and gu systems are negative, except as otherwise noted.      Objective    /70 (Cuff Size: Adult Regular)   Pulse 73   Temp 98  F (36.7  C) (Tympanic)   Resp 12   Wt 62.6 kg (138 lb)   SpO2 98%   BMI 23.50 kg/m    Body mass index is 23.5 kg/m .  Physical Exam     Physical Exam   GENERAL: healthy, alert, in no acute distress  SKIN:varicose veins bilateral lower extremities.  PSYCH: Alert and oriented times 3;  Able to articulate logical thoughts. Affect is normal.    Pending

## 2021-06-29 ENCOUNTER — TELEPHONE (OUTPATIENT)
Dept: VASCULAR SURGERY | Facility: CLINIC | Age: 46
End: 2021-06-29

## 2021-06-29 NOTE — RESULT ENCOUNTER NOTE
Please generate lab letter with comments below:  - Your lab results look stable; everything is normal.  - Your Hepatitis C Screening test was negative for infection.  - Your HIV 1 and 2 Antibody test is negative for infection.

## 2021-07-05 ENCOUNTER — TELEPHONE (OUTPATIENT)
Dept: FAMILY MEDICINE | Facility: CLINIC | Age: 46
End: 2021-07-05

## 2021-07-05 NOTE — TELEPHONE ENCOUNTER
"Routing to TC,    See referral information below and assist patient in scheduling    \"   Surgical Consult   [address]   [facility] [place] [address]   Phone: [phone]   Fax: [phone]     \"    Justin Carreon RN   "

## 2021-07-05 NOTE — TELEPHONE ENCOUNTER
Patient called via an .     Patient states that the phone number that was provided with her vascular surgery referral for varicose veins is incorrect. When she called that number it was for providers only. Patient was provided with a new number for a place in [place] but she only got a busy signal.    Patient wonders if the referral was correct and if someone could help assist her in scheduling.     Marisa Jurado RN on 7/5/2021 at 10:03 AM

## 2021-07-05 NOTE — TELEPHONE ENCOUNTER
The referral doesn't have a phone number on it, they were supposed to call her to schedule.    9042 - VASCULAR SURGERY REFERRAL None  1 1   Diagnosis Information    Diagnosis   I83.813 (ICD-10-CM) - Varicose veins of both lower extremities with pain   Referral Notes  Number of Notes: 2  .  Type Date User Summary Attachment   Provider Comments 06/28/2021  9:07 AM Lakeisha Albert PA-C These are questions asked for the attached order compiled into a note of type: Provider Comments -   Note    Referral Type:  Vascular Surgery  Referred to Location:  [facility]  Reason for Referral:  Varicose Veins with pain, has had previous surgery.              Can someone help with this?

## 2021-07-05 NOTE — TELEPHONE ENCOUNTER
Successfully reached patient with  (patient's phone automatically connects with an ) and then hosted a conference call with vascular surgery to schedule patient. Patient was successfully scheduled.     Please advise that patient prefers a phone call if at all possible and would appreciate being called for her allergy results when they are in.     ~Noemi MOREL,

## 2021-09-08 ENCOUNTER — OFFICE VISIT (OUTPATIENT)
Dept: VASCULAR SURGERY | Facility: CLINIC | Age: 46
End: 2021-09-08
Attending: PHYSICIAN ASSISTANT
Payer: MEDICARE

## 2021-09-08 DIAGNOSIS — I83.813 VARICOSE VEINS OF BOTH LOWER EXTREMITIES WITH PAIN: ICD-10-CM

## 2021-09-08 PROCEDURE — 99203 OFFICE O/P NEW LOW 30 MIN: CPT | Performed by: SURGERY
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
## 2021-09-24 NOTE — PROGRESS NOTES
Pre-Visit Planning   Next 5 appointments (look out 90 days)    Sep 27, 2021  8:10 AM  Office Visit with Lakeisha Albert PA-C, ASL IS  [facility] ([facility] ) [address] [address] [address] [phone]   Nov 17, 2021  9:30 AM  Return Visit with Kaiden Tyler MD  [facility] ([facility]) [address], [address]  [facility] [address] [phone]        Appointment Notes for this encounter:      tail bone pain and migraine med follow up   Job ID: [id number]  : Sheryl Biswas    Questionnaires Reviewed/Assigned  No additional questionnaires are needed      Patient preferred phone number: [phone]    Unable to reach. Left voicemail. Advised patient to call clinic back at [phone].

## 2021-09-27 ENCOUNTER — OFFICE VISIT (OUTPATIENT)
Dept: FAMILY MEDICINE | Facility: CLINIC | Age: 46
End: 2021-09-27
Payer: MEDICARE

## 2021-09-27 ENCOUNTER — TELEPHONE (OUTPATIENT)
Dept: FAMILY MEDICINE | Facility: CLINIC | Age: 46
End: 2021-09-27

## 2021-09-27 VITALS
SYSTOLIC BLOOD PRESSURE: 118 MMHG | OXYGEN SATURATION: 99 % | BODY MASS INDEX: 23.67 KG/M2 | WEIGHT: 139 LBS | HEART RATE: 56 BPM | DIASTOLIC BLOOD PRESSURE: 86 MMHG | TEMPERATURE: 98.1 F | RESPIRATION RATE: 12 BRPM

## 2021-09-27 DIAGNOSIS — G43.009 MIGRAINE WITHOUT AURA AND WITHOUT STATUS MIGRAINOSUS, NOT INTRACTABLE: ICD-10-CM

## 2021-09-27 DIAGNOSIS — J30.2 SEASONAL ALLERGIC RHINITIS, UNSPECIFIED TRIGGER: ICD-10-CM

## 2021-09-27 DIAGNOSIS — M53.3 PAIN IN THE COCCYX: Primary | ICD-10-CM

## 2021-09-27 DIAGNOSIS — R11.2 NON-INTRACTABLE VOMITING WITH NAUSEA, UNSPECIFIED VOMITING TYPE: ICD-10-CM

## 2021-09-27 PROCEDURE — 99214 OFFICE O/P EST MOD 30 MIN: CPT | Performed by: PHYSICIAN ASSISTANT

## 2021-09-27 RX ORDER — METHYLPREDNISOLONE 4 MG
TABLET, DOSE PACK ORAL
Qty: 21 TABLET | Refills: 0 | Status: SHIPPED | OUTPATIENT
Start: 2021-09-27 | End: 2021-11-03

## 2021-09-27 RX ORDER — ONDANSETRON 8 MG/1
8 TABLET, FILM COATED ORAL EVERY 8 HOURS PRN
Qty: 20 TABLET | Refills: 1 | Status: SHIPPED | OUTPATIENT
Start: 2021-09-27 | End: 2022-12-12

## 2021-09-27 RX ORDER — RIZATRIPTAN BENZOATE 10 MG/1
10 TABLET ORAL
Qty: 9 TABLET | Refills: 3 | Status: SHIPPED | OUTPATIENT
Start: 2021-09-27 | End: 2021-10-20

## 2021-09-27 RX ORDER — PROPRANOLOL HYDROCHLORIDE 80 MG/1
80 CAPSULE, EXTENDED RELEASE ORAL DAILY
Qty: 30 CAPSULE | Refills: 1 | Status: CANCELLED | OUTPATIENT
Start: 2021-09-27

## 2021-09-27 RX ORDER — LEVOCETIRIZINE DIHYDROCHLORIDE 5 MG/1
5 TABLET, FILM COATED ORAL EVERY EVENING
Qty: 30 TABLET | Refills: 1 | Status: SHIPPED | OUTPATIENT
Start: 2021-09-27 | End: 2021-11-03

## 2021-09-27 NOTE — TELEPHONE ENCOUNTER
Talked with patient and relayed results of the tests this morning.    Joseph Colindres, [facility]  [phone]  September 27, 2021, 11:44 AM

## 2021-09-27 NOTE — TELEPHONE ENCOUNTER
Please call and inform pt of the following:    Got allergy results, she is allergic to:  birch, poplar and elm trees  grass  ragweed  dust mites  mold

## 2021-09-27 NOTE — PATIENT INSTRUCTIONS
Patient Education     Understanding Coccydynia    Coccydynia is pain at the lowest tip of the spine (the coccyx, or tailbone). This is sometimes called a  bruised tailbone.  Tailbone pain can be very uncomfortable. It can also interfere with daily activities, such as driving.    How to say it  isis aguilera  What causes coccydynia?   Causes of tailbone pain include:    Injury to the tailbone from a blow or fall    A bone spur on the tailbone    Poor posture while sitting    Sitting for a long time in an uncomfortable position    Vaginal childbirth    Arthritis  In some cases, the cause of the pain can t be found.   Symptoms of coccydynia   You may feel:     A dull ache or sharp pain in the tailbone area, near the top of the buttocks    Muscle spasms in the lower back or pelvic area    A sense of pressure in the rectum  Pain may be triggered by things like walking or standing up from sitting. It may hurt more if you sit for long periods. Things that put pressure on the tailbone, such as riding a horse or having sex, may also trigger the pain.   Treatment for coccydynia   Tailbone pain often goes away by itself within a few months. Treatment focuses on reducing pain and protecting the tailbone. Treatments can include:     Over-the-counter or prescription medicine to help relieve pain and swelling. NSAIDs (nonsteroidal anti-inflammatory drugs) are the most common medicines used. Medicines may be prescribed or bought over the counter. They may be given as pills. Or they may be put on the skin as a gel, cream, or patch.    Warm or cold to help relieve symptoms for a time, such as a heating pad, hot water bottle, or ice pack    Keeping pressure off the tailbone to help the area heal by shifting weight forward onto your hipbones and thighs when sitting or sitting on a special cushion    Medicine injected into the area to help relieve severe symptoms. The medicine is usually a corticosteroid. This is a strong  anti-inflammatory medicine.    Physical therapy to help strengthen the structures around the tailbone  If no other treatments work, you may need surgery to remove all or part of the coccyx.   When to call your healthcare provider   Call your healthcare provider right away if you have any of these:     Pain that continues for more than 2 months or gets worse    Pain that limits your usual activities    Pain that doesn t get better by trying the self-care treatments described above    Fever of 100.4 F (38 C) or higher, or as directed by your provider    Chills    Redness or swelling    A new sore in the cleft of the buttocks, especially one that contains or drains pus    Other new symptoms  Los last reviewed this educational content on 6/1/2019 2000-2021 [employer]. All rights reserved. This information is not intended as a substitute for professional medical care. Always follow your healthcare professional's instructions.

## 2021-09-27 NOTE — PROGRESS NOTES
Assessment & Plan     Pain in the coccyx  Will get donut pillow, discussed imaging doesn't .  May need to see ortho if not improving.  - methylPREDNISolone (MEDROL DOSEPAK) 4 MG tablet therapy pack; Follow Package Directions  - [facility] Referral; Future    Migraine without aura and without status migrainosus, not intractable  Side effects from imitrex, will switch to maxalt.  - rizatriptan (MAXALT) 10 MG tablet; Take 1 tablet (10 mg) by mouth at onset of headache for migraine May repeat in 2 hours. Max 3 tablets/24 hours.    Seasonal allergic rhinitis, unspecified trigger  Will see if xyzal works better than zyrtec.  - levocetirizine (XYZAL) 5 MG tablet; Take 1 tablet (5 mg) by mouth every evening      Return in about 1 week (around 10/4/2021) for PT.    Lakeisha Albert PA-C  [facility] YANIRA Cochran is a 45 year old who presents for the following health issues  accompanied by her :    History of Present Illness       She eats 2-3 servings of fruits and vegetables daily.She consumes 1 sweetened beverage(s) daily.   She is taking medications regularly.       Back Pain  Onset/Duration: 6 weeks ago  Description:   Location of pain: low back bilateral  Character of pain: dull ache  Pain radiation: none  New numbness or weakness in legs, not attributed to pain: no   Intensity:  moderate  Progression of Symptoms: worsening  History:   Specific cause: none  Pain interferes with job:  no   History of back problems: no prior back problems  Any previous MRI or X-rays: None  Sees a specialist for back pain: No  Alleviating factors:   Improved by: acetaminophen (Tylenol) and NSAIDs    Precipitating factors:  Worsened by: Sitting  Therapies tried and outcome: acetaminophen (Tylenol) and NSAIDs    Accompanying Signs & Symptoms:  Risk of Fracture: None  Risk of Cauda Equina: None  Risk of Infection: None  Risk of Cancer: None  Risk of Ankylosing  Spondylitis: Onset at age <35, male, AND morning back stiffness  no   Additional complaints: allergies and migraines  HPI additional notes: Sammie presents today with   Chief Complaint   Patient presents with     Back Pain     lower for 6 weeks     Allergies     Headache     questions about Imitrex   Tailbone pain, old injury to coccxy, tylenol and ibuprofen daily and not helping.  Has not tried prednisone or donut pillow.  Is willing to try PT.    Imitrex works well but she feels hung over the next day.    Still haven't gotten allergy testing results, will resend CHE.         Review of Systems   Constitutional, HEENT, cardiovascular, pulmonary, gi and gu systems are negative, except as otherwise noted.      Objective    /86 (Cuff Size: Adult Regular)   Pulse 56   Temp 98.1  F (36.7  C)   Resp 12   Wt 63 kg (139 lb)   SpO2 99%   BMI 23.67 kg/m    Body mass index is 23.67 kg/m .  Physical Exam     Physical Exam   GENERAL: healthy, alert, in no acute distress  MS: tenderness to palpation of coccyx, no pilonidal cyst.  SKIN: no suspicious lesions, no rashes  PSYCH: Alert and oriented times 3;  Able to articulate logical thoughts. Affect is normal.

## 2021-10-20 DIAGNOSIS — G43.009 MIGRAINE WITHOUT AURA AND WITHOUT STATUS MIGRAINOSUS, NOT INTRACTABLE: ICD-10-CM

## 2021-10-20 RX ORDER — RIZATRIPTAN BENZOATE 10 MG/1
10 TABLET ORAL
Qty: 18 TABLET | Refills: 1 | Status: SHIPPED | OUTPATIENT
Start: 2021-10-20 | End: 2021-11-03

## 2021-10-20 NOTE — TELEPHONE ENCOUNTER
Sent #18.  Follow-up with Natasha as scheduled.     Needs to consider daily preventative medication to reduce frequency of migraine.  Consider topamax or amitriptyline daily to prevent migraine as that is too high of frequency for ongoing use of Maxalt.

## 2021-10-20 NOTE — TELEPHONE ENCOUNTER
Patient requesting refill on her medication, only 9 tablets sent, patient requesting that it be sent for a 30 day supply     Lola Kitchen/

## 2021-10-20 NOTE — TELEPHONE ENCOUNTER
Pt states she is having more frequent migraines ok to increase dispense amount?    Marysol Moralez, RN

## 2021-10-27 ENCOUNTER — TELEPHONE (OUTPATIENT)
Dept: OBGYN | Facility: CLINIC | Age: 46
End: 2021-10-27

## 2021-10-27 NOTE — TELEPHONE ENCOUNTER
Pt calling with .  States her annual from 3/1/21 has 2 items not covered by insurance, but they should have been.  She has spoken to the billing office and was told to call us to re-bill thyroid and lipids.     Code numbers she was given are as follows:  73015  [id number]  I am unsure what these are, I can see that the diagnosis looks correct for an annual exam.    Forwarding onto admin to review.    Julia Reza RN

## 2021-11-03 ENCOUNTER — OFFICE VISIT (OUTPATIENT)
Dept: FAMILY MEDICINE | Facility: CLINIC | Age: 46
End: 2021-11-03
Payer: MEDICARE

## 2021-11-03 VITALS
SYSTOLIC BLOOD PRESSURE: 138 MMHG | BODY MASS INDEX: 24.57 KG/M2 | DIASTOLIC BLOOD PRESSURE: 88 MMHG | OXYGEN SATURATION: 100 % | HEART RATE: 64 BPM | WEIGHT: 143.9 LBS | HEIGHT: 64 IN | TEMPERATURE: 98.2 F | RESPIRATION RATE: 18 BRPM

## 2021-11-03 DIAGNOSIS — J30.2 SEASONAL ALLERGIC RHINITIS, UNSPECIFIED TRIGGER: ICD-10-CM

## 2021-11-03 DIAGNOSIS — G43.009 MIGRAINE WITHOUT AURA AND WITHOUT STATUS MIGRAINOSUS, NOT INTRACTABLE: ICD-10-CM

## 2021-11-03 DIAGNOSIS — R51.9 SINUS HEADACHE: ICD-10-CM

## 2021-11-03 PROCEDURE — 99214 OFFICE O/P EST MOD 30 MIN: CPT | Performed by: PHYSICIAN ASSISTANT

## 2021-11-03 RX ORDER — LEVOCETIRIZINE DIHYDROCHLORIDE 5 MG/1
5 TABLET, FILM COATED ORAL EVERY EVENING
Qty: 30 TABLET | Refills: 1 | Status: SHIPPED | OUTPATIENT
Start: 2021-11-03

## 2021-11-03 RX ORDER — NARATRIPTAN 2.5 MG/1
2.5 TABLET ORAL
Qty: 9 TABLET | Refills: 1 | Status: CANCELLED | OUTPATIENT
Start: 2021-11-03

## 2021-11-03 RX ORDER — MONTELUKAST SODIUM 10 MG/1
10 TABLET ORAL AT BEDTIME
Qty: 30 TABLET | Refills: 1 | Status: SHIPPED | OUTPATIENT
Start: 2021-11-03 | End: 2024-03-27

## 2021-11-03 RX ORDER — RIZATRIPTAN BENZOATE 10 MG/1
10 TABLET ORAL
Qty: 18 TABLET | Refills: 1 | Status: SHIPPED | OUTPATIENT
Start: 2021-11-03 | End: 2022-04-10

## 2021-11-03 RX ORDER — MOMETASONE FUROATE MONOHYDRATE 50 UG/1
2 SPRAY, METERED NASAL DAILY
Qty: 17 G | Refills: 1 | Status: SHIPPED | OUTPATIENT
Start: 2021-11-03

## 2021-11-03 ASSESSMENT — MIFFLIN-ST. JEOR: SCORE: 1282.73

## 2021-11-03 NOTE — PROGRESS NOTES
"  Assessment & Plan     Migraine without aura and without status migrainosus, not intractable  Maxalt working well, will try to adjust allergy medications to see if that is what is triggering more frequent migraines.  If not improving in 2 weeks, will start amitriptyline for prophylaxis, pt did not tolerate propranolol.  - rizatriptan (MAXALT) 10 MG tablet; Take 1 tablet (10 mg) by mouth at onset of headache for migraine May repeat in 2 hours. Max 3 tablets/24 hours.    Seasonal allergic rhinitis, unspecified trigger    - montelukast (SINGULAIR) 10 MG tablet; Take 1 tablet (10 mg) by mouth At Bedtime  - levocetirizine (XYZAL) 5 MG tablet; Take 1 tablet (5 mg) by mouth every evening    Sinus headache  Will need PA if not approved, already failed fluticasone and flunisolide.  - mometasone (NASONEX) 50 MCG/ACT nasal spray; Spray 2 sprays into both nostrils daily      Return in about 2 weeks (around 11/17/2021) for Recheck if not improving.    Lakeisha Albert PA-C  [facility] YANIRA Cochran is a 45 year old who presents for the following health issues with     History of Present Illness       Migraines:   Since the patient's last clinic visit, headaches are: worsened  The patient is getting headaches:  Oct more often  nov just started  She is not able to do normal daily activities when she has a migraine.  The patient is taking the following rescue/relief medications:  Ibuprofen (Advil, Motrin), Tylenol and other   Patient states \"I get only a small amount of relief\" from the rescue/relief medications.   The patient is taking the following medications to prevent migraines:  Other  In the past 4 weeks, the patient has gone to an Urgent Care or Emergency Room 0 times times due to headaches.    She eats 2-3 servings of fruits and vegetables daily.She consumes 1 sweetened beverage(s) daily.She exercises with enough effort to increase her heart rate 30 to 60 minutes per " "day.    She is taking medications regularly.     Additional complaints: None  HPI additional notes: Sammie presents today with   Chief Complaint   Patient presents with     Migraine     really bad tuesday night     Seeing allergist on Monday.  Having hot flashes now more often.  Took 7 rizatriptan in the month of the Oct. Helps when she uses them with no negative side effects.       Review of Systems   Constitutional, HEENT, cardiovascular, pulmonary, gi and gu systems are negative, except as otherwise noted.      Objective    /88   Pulse 64   Temp 98.2  F (36.8  C) (Oral)   Resp 18   Ht 1.626 m (5' 4\")   Wt 65.3 kg (143 lb 14.4 oz)   SpO2 100%   BMI 24.70 kg/m    Body mass index is 24.7 kg/m .  Physical Exam     Physical Exam   GENERAL: healthy, alert, in no acute distress  SKIN: no suspicious lesions, no rashes  PSYCH: Alert and oriented times 3;  Able to articulate logical thoughts. Affect is normal.   "

## 2021-11-09 ENCOUNTER — NURSE TRIAGE (OUTPATIENT)
Dept: NURSING | Facility: CLINIC | Age: 46
End: 2021-11-09
Payer: MEDICARE

## 2021-11-09 ENCOUNTER — OFFICE VISIT (OUTPATIENT)
Dept: URGENT CARE | Facility: URGENT CARE | Age: 46
End: 2021-11-09
Payer: MEDICARE

## 2021-11-09 VITALS
HEART RATE: 66 BPM | BODY MASS INDEX: 24.03 KG/M2 | DIASTOLIC BLOOD PRESSURE: 90 MMHG | SYSTOLIC BLOOD PRESSURE: 130 MMHG | RESPIRATION RATE: 16 BRPM | WEIGHT: 140 LBS | OXYGEN SATURATION: 100 %

## 2021-11-09 DIAGNOSIS — Z20.822 SUSPECTED COVID-19 VIRUS INFECTION: Primary | ICD-10-CM

## 2021-11-09 DIAGNOSIS — J30.9 ALLERGIC RHINITIS, UNSPECIFIED SEASONALITY, UNSPECIFIED TRIGGER: ICD-10-CM

## 2021-11-09 PROCEDURE — U0005 INFEC AGEN DETEC AMPLI PROBE: HCPCS | Performed by: PHYSICIAN ASSISTANT

## 2021-11-09 PROCEDURE — 99213 OFFICE O/P EST LOW 20 MIN: CPT | Performed by: PHYSICIAN ASSISTANT

## 2021-11-09 PROCEDURE — U0003 INFECTIOUS AGENT DETECTION BY NUCLEIC ACID (DNA OR RNA); SEVERE ACUTE RESPIRATORY SYNDROME CORONAVIRUS 2 (SARS-COV-2) (CORONAVIRUS DISEASE [COVID-19]), AMPLIFIED PROBE TECHNIQUE, MAKING USE OF HIGH THROUGHPUT TECHNOLOGIES AS DESCRIBED BY CMS-2020-01-R: HCPCS | Performed by: PHYSICIAN ASSISTANT

## 2021-11-09 NOTE — TELEPHONE ENCOUNTER
Patient is requesting an appointment.  Patient has a fever and cough and shortness of breath.  Patient was seen yesterday for allergies and injections for allergies.  Patient had a headache yesterday.  Patient is requesting to speak with [facility] direct to schedule an appointment as she cannot go to walk in as she needs an  and states that her symptoms are allergy related and not covid related.    COVID 19 Nurse Triage Plan/Patient Instructions    Please be aware that novel coronavirus (COVID-19) may be circulating in the community. If you develop symptoms such as fever, cough, or SOB or if you have concerns about the presence of another infection including coronavirus (COVID-19), please contact your health care provider or visit [email or web address].     Disposition/Instructions    Virtual Visit with provider recommended. Reference Visit Selection Guide.    Thank you for taking steps to prevent the spread of this virus.  o Limit your contact with others.  o Wear a simple mask to cover your cough.  o Wash your hands well and often.    Resources    [facility]: About COVID-19: [email or web address]    CDC: What to Do If You're Sick: www.cdc.gov/coronavirus/2019-ncov/about/steps-when-sick.html    CDC: Ending Home Isolation: www.cdc.gov/coronavirus/2019-ncov/hcp/disposition-in-home-patients.html     CDC: Caring for Someone: www.cdc.gov/coronavirus/2019-ncov/if-you-are-sick/care-for-someone.html     [facility]: Interim Guidance for Hospital Discharge to Home: www.health.Pending [facility] to [facility].mn.us/diseases/coronavirus/hcp/hospdischarge.pdf    [facility] clinical trials (COVID-19 research studies): clinicalaffairs.[facility]/umn-clinical-trials     Below are the COVID-19 hotlines at the [facility] of Health ([facility]). Interpreters are available.   o For health questions: Call 639-212-9308 or [phone] (7 a.m. to 7 p.m.)  o For questions about schools and childcare: Call  [phone] or [phone] (7 a.m. to 7 p.m.)                       Reason for Disposition    Fever present > 3 days (72 hours)    Additional Information    Negative: SEVERE difficulty breathing (e.g., struggling for each breath, speaks in single words)    Negative: Difficult to awaken or acting confused (e.g., disoriented, slurred speech)    Negative: Bluish (or gray) lips or face now    Negative: Shock suspected (e.g., cold/pale/clammy skin, too weak to stand, low BP, rapid pulse)    Negative: Sounds like a life-threatening emergency to the triager    Negative: SEVERE or constant chest pain or pressure (Exception: mild central chest pain, present only when coughing)    Negative: MODERATE difficulty breathing (e.g., speaks in phrases, SOB even at rest, pulse 100-120)    Negative: [1] Headache AND [2] stiff neck (can't touch chin to chest)    Negative: MILD difficulty breathing (e.g., minimal/no SOB at rest, SOB with walking, pulse <100)    Negative: Chest pain or pressure    Negative: Patient sounds very sick or weak to the triager    Negative: Fever > 103 F (39.4 C)    Negative: [1] Fever > 101 F (38.3 C) AND [2] age > 60 years    Negative: [1] Fever > 100.0 F (37.8 C) AND [2] bedridden (e.g., nursing home patient, CVA, chronic illness, recovering from surgery)    Negative: HIGH RISK for severe COVID complications (e.g., age > 64 years, obesity with BMI > 25, pregnant, chronic lung disease or other chronic medical condition)  (Exception: Already seen by PCP and no new or worsening symptoms.)    Negative: [1] HIGH RISK patient AND [2] influenza is widespread in the community AND [3] ONE OR MORE respiratory symptoms: cough, sore throat, runny or stuffy nose    Negative: [1] HIGH RISK patient AND [2] influenza exposure within the last 7 days AND [3] ONE OR MORE respiratory symptoms: cough, sore throat, runny or stuffy nose    Negative: [1] COVID-19 infection suspected by caller or triager AND [2] mild symptoms  (cough, fever, or others) AND [3] negative COVID-19 rapid test    Protocols used: CORONAVIRUS (COVID-19) DIAGNOSED OR [id number] 8.25.2021

## 2021-11-09 NOTE — PATIENT INSTRUCTIONS
"Continue allergy medications.     Your COVID test results should be available within 3 days, but please allow up to 1 week. If you have MyChart, your COVID test results will be visible there. If you do not have MyChart, you will be called if your test is positive and sent a letter if your test is negative.  Please continue to isolate yourself until you receive your results.    Discharge Instructions for COVID-19 Patients  You have--or may have--COVID-19. Please follow the instructions listed below.   If you have a weakened immune system, discuss with your doctor any other actions you need to take.  How can I protect others?  If you have symptoms (fever, cough, body aches or trouble breathing):    Stay home and away from others (self-isolate) until:  ? Your other symptoms have resolved (gotten better). And   ? You've had no fever--and no medicine that reduces fever--for 1 full day (24 hours). And   ? At least 10 days have passed since your symptoms started. (You may need to wait 20 days. Follow the advice of your care team.)  If you don't show symptoms, but testing showed that you have COVID-19:    Stay home and away from others (self-isolate) until at least 10 days have passed since the date of your first positive COVID-19 test.  During this time    Stay in your own room, even for meals. Use your own bathroom if you can.    Stay away from others in your home. No hugging, kissing or shaking hands. No visitors.    Don't go to work, school or anywhere else.    Clean \"high touch\" surfaces often (doorknobs, counters, handles). Use household cleaning spray or wipes.    You'll find a full list of  on the EPA website: www.epa.gov/pesticide-registration/list-n-disinfectants-use-against-sars-cov-2.    Cover your mouth and nose with a mask or other face covering to avoid spreading germs.    Wash your hands and face often. Use soap and water.    Caregivers in these groups are at risk for severe illness due to " COVID-19:  ? People 65 years and older  ? People who live in a nursing home or long-term care facility  ? People with chronic disease (lung, heart, cancer, diabetes, kidney, liver, immunologic)  ? People who have a weakened immune system, including those who:    Are in cancer treatment    Take medicine that weakens the immune system, such as corticosteroids    Had a bone marrow or organ transplant    Have an immune deficiency    Have poorly controlled HIV or AIDS    Are obese (body mass index of 40 or higher)    Smoke regularly    Caregivers should wear gloves while washing dishes, handling laundry and cleaning bedrooms and bathrooms.    Use caution when washing and drying laundry: Don't shake dirty laundry and use the warmest water setting that you can.    For more tips on managing your health at home, go to www.cdc.gov/coronavirus/2019-ncov/downloads/10Things.pdf.  How can I take care of myself at home?  1. Get lots of rest. Drink extra fluids (unless a doctor has told you not to).  2. Take Tylenol (acetaminophen) for fever or pain. If you have liver or kidney problems, ask your family doctor if it's okay to take Tylenol.   Adults can take either:   ? 650 mg (two 325 mg pills) every 4 to 6 hours, or   ? 1,000 mg (two 500 mg pills) every 8 hours as needed.  ? Note: Don't take more than 3,000 mg in one day. Acetaminophen is found in many medicines (both prescribed and over-the-counter medicines). Read all labels to be sure you don't take too much.   For children, check the Tylenol bottle for the right dose. The dose is based on the child's age or weight.  3. If you have other health problems (like cancer, heart failure, an organ transplant or severe kidney disease): Call your specialty clinic if you don't feel better in the next 2 days.  4. Know when to call 911. Emergency warning signs include:  ? Trouble breathing or shortness of breath  ? Pain or pressure in the chest that doesn't go away  ? Feeling confused like  you haven't felt before, or not being able to wake up  ? Bluish-colored lips or face  5. Your doctor may have prescribed a blood thinner medicine. Follow their instructions.  Where can I get more information?    [facility] - About COVID-19:   [email or web address]    CDC - What to Do If You're Sick: www.cdc.gov/coronavirus/2019-ncov/about/steps-when-sick.html    CDC - Ending Home Isolation: www.cdc.gov/coronavirus/2019-ncov/hcp/disposition-in-home-patients.html    [facility] - Caring for Someone: www.cdc.gov/coronavirus/2019-ncov/if-you-are-sick/care-for-someone.html    [facility] - Interim Guidance for Hospital Discharge to Home: www.health.[facility].mn.us/diseases/coronavirus/hcp/hospdischarge.pdf    Below are the COVID-19 hotlines at the [place] Department of Health ([facility]). Interpreters are available.  ? For health questions: Call 288-646-0435 or 1-125.459.8044 (7 a.m. to 7 p.m.)  ? For questions about schools and childcare: Call 456-087-2329 or 1-905.465.8785 (7 a.m. to 7 p.m.)    For informational purposes only. Not to replace the advice of your health care provider. Clinically reviewed by Dr. Abhijit Lau.   Copyright   2020 [facility]. All rights reserved. [employer] [id number] - REV 01/05/21.

## 2021-11-09 NOTE — PROGRESS NOTES
Assessment/Plan:    No acute distress or toxicity noted. Lungs CTAB, no signs of pneumonia. No sinus tenderness. Suspect viral illness vs allergic rhinitis.    Discussed that symptoms do overlap with possible COVID-19, and patient was tested for this today. Isolate while awaiting test results.     See patient instructions below.  At the end of the encounter, I discussed results, diagnosis, medications. Discussed red flags for immediate return to clinic/ER, as well as indications for follow up if no improvement. Patient understood and agreed to plan. Patient was stable for discharge.      ICD-10-CM    1. Suspected COVID-19 virus infection  Z20.822 Symptomatic COVID-19 Virus (Coronavirus) by PCR Nose   2. Allergic rhinitis, unspecified seasonality, unspecified trigger  J30.9          Return in about 1 week (around 11/16/2021) for Follow up w/ primary care provider if not better.    LINA Matias, SHERLYN  [facility]    ------------------------------------------------------------------------------------------------------------------------------------------------------------------------  HPI:  Sammie Ramirez is a 45 year old female with hx of allergic rhinitis who presents for evaluation of nasal congestion, cough, sinus pressure, and subjective fever onset yesterday. Unknown Tmax. She was seen by her allergist for allergy shots yesterday and was told she was wheezing (pt is deaf so cannot tell if she is or not). She takes Xyzal, Nasonex, and Singulair for her allergies. Patient reports no myalgias, sore throat, loss of sense of taste or smell, headache, chest pain, shortness of breath, abdominal pain, nausea, vomiting, diarrhea, rash, or any other symptoms. No known sick contacts/COVID exposure.       Past Medical History:   Diagnosis Date     ASCUS (atypical squamous cells of undetermined significance) on Pap smear 10/13/2016    Neg HPV     History of colposcopy 06/14/2018 5/24/21      LSIL (low grade squamous intraepithelial lesion) on Pap smear 02/01/2014       Vitals:    11/09/21 1430   BP: (!) 130/90   BP Location: Right arm   Patient Position: Chair   Cuff Size: Adult Regular   Pulse: 66   Resp: 16   SpO2: 100%   Weight: 63.5 kg (140 lb)       Physical Exam  Vitals and nursing note reviewed.   HENT:      Right Ear: Tympanic membrane normal.      Left Ear: Tympanic membrane normal.      Nose:      Comments: No sinus tenderness     Mouth/Throat:      Mouth: Mucous membranes are moist.      Pharynx: Oropharynx is clear.   Cardiovascular:      Rate and Rhythm: Normal rate and regular rhythm.      Heart sounds: Normal heart sounds.   Pulmonary:      Effort: Pulmonary effort is normal.      Breath sounds: Normal breath sounds.   Neurological:      Mental Status: She is alert.         Labs/Imaging:  No results found for this or any previous visit (from the past 24 hour(s)).      Patient Instructions   Continue allergy medications.     Your COVID test results should be available within 3 days, but please allow up to 1 week. If you have MyChart, your COVID test results will be visible there. If you do not have MyChart, you will be called if your test is positive and sent a letter if your test is negative.  Please continue to isolate yourself until you receive your results.    Discharge Instructions for COVID-19 Patients  You have--or may have--COVID-19. Please follow the instructions listed below.   If you have a weakened immune system, discuss with your doctor any other actions you need to take.  How can I protect others?  If you have symptoms (fever, cough, body aches or trouble breathing):    Stay home and away from others (self-isolate) until:  ? Your other symptoms have resolved (gotten better). And   ? You've had no fever--and no medicine that reduces fever--for 1 full day (24 hours). And   ? At least 10 days have passed since your symptoms started. (You may need to wait 20 days. Follow the  "advice of your care team.)  If you don't show symptoms, but testing showed that you have COVID-19:    Stay home and away from others (self-isolate) until at least 10 days have passed since the date of your first positive COVID-19 test.  During this time    Stay in your own room, even for meals. Use your own bathroom if you can.    Stay away from others in your home. No hugging, kissing or shaking hands. No visitors.    Don't go to work, school or anywhere else.    Clean \"high touch\" surfaces often (doorknobs, counters, handles). Use household cleaning spray or wipes.    You'll find a full list of  on the EPA website: www.epa.gov/pesticide-registration/list-n-disinfectants-use-against-sars-cov-2.    Cover your mouth and nose with a mask or other face covering to avoid spreading germs.    Wash your hands and face often. Use soap and water.    Caregivers in these groups are at risk for severe illness due to COVID-19:  ? People 65 years and older  ? People who live in a nursing home or long-term care facility  ? People with chronic disease (lung, heart, cancer, diabetes, kidney, liver, immunologic)  ? People who have a weakened immune system, including those who:    Are in cancer treatment    Take medicine that weakens the immune system, such as corticosteroids    Had a bone marrow or organ transplant    Have an immune deficiency    Have poorly controlled HIV or AIDS    Are obese (body mass index of 40 or higher)    Smoke regularly    Caregivers should wear gloves while washing dishes, handling laundry and cleaning bedrooms and bathrooms.    Use caution when washing and drying laundry: Don't shake dirty laundry and use the warmest water setting that you can.    For more tips on managing your health at home, go to www.cdc.gov/coronavirus/2019-ncov/downloads/10Things.pdf.  How can I take care of myself at home?  1. Get lots of rest. Drink extra fluids (unless a doctor has told you not to).  2. Take Tylenol " (acetaminophen) for fever or pain. If you have liver or kidney problems, ask your family doctor if it's okay to take Tylenol.   Adults can take either:   ? 650 mg (two 325 mg pills) every 4 to 6 hours, or   ? 1,000 mg (two 500 mg pills) every 8 hours as needed.  ? Note: Don't take more than 3,000 mg in one day. Acetaminophen is found in many medicines (both prescribed and over-the-counter medicines). Read all labels to be sure you don't take too much.   For children, check the Tylenol bottle for the right dose. The dose is based on the child's age or weight.  3. If you have other health problems (like cancer, heart failure, an organ transplant or severe kidney disease): Call your specialty clinic if you don't feel better in the next 2 days.  4. Know when to call 911. Emergency warning signs include:  ? Trouble breathing or shortness of breath  ? Pain or pressure in the chest that doesn't go away  ? Feeling confused like you haven't felt before, or not being able to wake up  ? Bluish-colored lips or face  5. Your doctor may have prescribed a blood thinner medicine. Follow their instructions.  Where can I get more information?    [facility] - About COVID-19:   [email or web address]    CDC - What to Do If You're Sick: www.cdc.gov/coronavirus/2019-ncov/about/steps-when-sick.html    CDC - Ending Home Isolation: www.cdc.gov/coronavirus/2019-ncov/hcp/disposition-in-home-patients.html    CDC - Caring for Someone: www.cdc.gov/coronavirus/2019-ncov/if-you-are-sick/care-for-someone.html    [facility] - Interim Guidance for Hospital Discharge to Home: www.health.[facility].mn.us/diseases/coronavirus/hcp/hospdischarge.pdf    Below are the COVID-19 hotlines at the [place] Department of Health ([facility]). Interpreters are available.  ? For health questions: Call 708-850-3338 or 1-451.605.4210 (7 a.m. to 7 p.m.)  ? For questions about schools and childcare: Call 345-446-1252 or 1-502.479.6378 (7 a.m. to 7 p.m.)    For  informational purposes only. Not to replace the advice of your health care provider. Clinically reviewed by Dr. Abhijit Lau.   Copyright   2020 [employer]. All rights reserved. [employer] [id number] - REV 01/05/21.

## 2021-11-11 ENCOUNTER — TELEPHONE (OUTPATIENT)
Dept: URGENT CARE | Facility: URGENT CARE | Age: 46
End: 2021-11-11

## 2021-11-11 LAB — SARS-COV-2 RNA RESP QL NAA+PROBE: POSITIVE

## 2021-11-11 NOTE — TELEPHONE ENCOUNTER
Coronavirus (COVID-19) Notification    Reason for call  Notify of POSITIVE  COVID-19 lab result, assess symptoms,  review [facility] recommendations    Lab Result   Lab test for 2019-nCoV rRt-PCR or SARS-COV-2 PCR  Oropharyngeal AND/OR nasopharyngeal swabs were POSITIVE for 2019-nCoV RNA [OR] SARS-COV-2 RNA (COVID-19) RNA     We have been unable to reach Patient by phone at this time to notify of their Positive COVID-19 result.  Left voicemail message requesting a call back to [phone] [facility] for results.        POSITIVE COVID-19 Letter sent.    Left message using     Chrystal Navarrete LPN

## 2021-11-22 ENCOUNTER — NURSE TRIAGE (OUTPATIENT)
Dept: FAMILY MEDICINE | Facility: CLINIC | Age: 46
End: 2021-11-22
Payer: MEDICARE

## 2021-11-22 DIAGNOSIS — J32.9 SINUSITIS, UNSPECIFIED CHRONICITY, UNSPECIFIED LOCATION: Primary | ICD-10-CM

## 2021-11-22 RX ORDER — AZITHROMYCIN 250 MG/1
TABLET, FILM COATED ORAL
Qty: 6 TABLET | Refills: 0 | Status: SHIPPED | OUTPATIENT
Start: 2021-11-22 | End: 2021-11-27

## 2021-11-22 RX ORDER — FLUCONAZOLE 150 MG/1
150 TABLET ORAL ONCE
Qty: 1 TABLET | Refills: 0 | Status: SHIPPED | OUTPATIENT
Start: 2021-11-22 | End: 2021-12-21

## 2021-11-22 NOTE — LETTER
November 23, 2021      Sammie Ramirez  [address] [address]        Dear Sammie,     I attempted to reach you by phone and left a message. Lakeisha Albert PA-C did send in a prescription for azithromycin (ZITHROMAX) 250 MG tablet as well as fluconazole (DIFLUCAN) 150 MG tablet to your pharmacy. This should help with your nasal congestion. Please let us know if you have any questions or concerns. Our clinic line is [phone]. Thank you.         Sincerely,        Care team of Lakeisha Albert PA-C

## 2021-11-22 NOTE — TELEPHONE ENCOUNTER
Attempt #2, LVMTCB. Informed in message that a prescription was sent to pharmacy. Advised to call clinic back with any questions.     Davin RODAS RN

## 2021-11-22 NOTE — TELEPHONE ENCOUNTER
"S-(situation): Patient left VM on PAL4 line over weekend. RN calling patient back to triage ongoing sinus concerns from COVID.     B-(background): Previous COVID positive 11/9/21. Patient has lingering sinus issues. Patient notes they have been feeling a lot better from the past 2 weeks.     A-(assessment): Patient experiencing nasal congestion and runny nose for past 2 weeks. Nasal discharge is clear. Mild cough with post-nasal drip. Patient describes \"heavy stuffiness in nose and cheek area\". Denies chest congestion currently. No sinus pain. Patient experiencing mild headache due to sinus pressure. No fever. No earache. No difficulty breathing.      R-(recommendations): Reviewed care advice under care tab. Advised neti-pot and increased fluids. Per protocol, advised visit today or tomorrow to be seen. Patient states she has had sinus issues in the past and wondering if provider would be willing to place a prescription for antibiotics. Patient is requesting message be sent to provider to inquire if visit needed.       Reason for Disposition    Nasal discharge present > 10 days    Sinus congestion (pressure, fullness) present > 10 days    Additional Information    Negative: Sounds like a life-threatening emergency to the triager    Negative: Difficulty breathing, and not from stuffy nose (e.g., not relieved by cleaning out the nose)    Negative: SEVERE headache and has fever    Negative: Patient sounds very sick or weak to the triager    Negative: SEVERE sinus pain    Negative: Severe headache    Negative: Redness or swelling on the cheek, forehead, or around the eye    Negative: Fever > 103 F (39.4 C)    Negative: Fever > 101 F (38.3 C) and over 60 years of age    Negative: Fever > 100.0 F (37.8 C) and has diabetes mellitus or a weak immune system (e.g., HIV positive, cancer chemotherapy, organ transplant, splenectomy, chronic steroids)    Negative: Fever > 100.0 F (37.8 C) and bedridden (e.g., nursing home " "patient, stroke, chronic illness, recovering from surgery)    Negative: Fever present > 3 days (72 hours)    Negative: Fever returns after gone for over 24 hours and symptoms worse or not improved    Negative: Sinus pain (not just congestion) and fever    Negative: Earache    Answer Assessment - Initial Assessment Questions  1. LOCATION: \"Where does it hurt?\"       No pain. Heavy stuffiness in nose   2. ONSET: \"When did the sinus pain start?\"  (e.g., hours, days)       2 weeks ago for congestion. No sinus pain described  3. SEVERITY: \"How bad is the pain?\"   (Scale 1-10; mild, moderate or severe)    - MILD (1-3): doesn't interfere with normal activities     - MODERATE (4-7): interferes with normal activities (e.g., work or school) or awakens from sleep    - SEVERE (8-10): excruciating pain and patient unable to do any normal activities         No sinus pain, just \"heavy stuffiness\"  4. RECURRENT SYMPTOM: \"Have you ever had sinus problems before?\" If so, ask: \"When was the last time?\" and \"What happened that time?\"       Yes, was given antibiotics   5. NASAL CONGESTION: \"Is the nose blocked?\" If so, ask, \"Can you open it or must you breathe through the mouth?\"      Yes, feels blocked  6. NASAL DISCHARGE: \"Do you have discharge from your nose?\" If so ask, \"What color?\"      Clear nasal discharge  7. FEVER: \"Do you have a fever?\" If so, ask: \"What is it, how was it measured, and when did it start?\"       No fever  8. OTHER SYMPTOMS: \"Do you have any other symptoms?\" (e.g., sore throat, cough, earache, difficulty breathing)      No sore throat, no earache, no dificulty breathing  9. PREGNANCY: \"Is there any chance you are pregnant?\" \"When was your last menstrual period?\"      NA    Protocols used: SINUS PAIN OR CONGESTION-A-DELL RODAS RN    "

## 2021-11-22 NOTE — TELEPHONE ENCOUNTER
Attempted to reach patient, LVMTCB. Can route message to Triage nurse to discuss message below on call back.     Davin RODAS RN

## 2021-12-01 ENCOUNTER — TELEPHONE (OUTPATIENT)
Dept: FAMILY MEDICINE | Facility: CLINIC | Age: 46
End: 2021-12-01
Payer: MEDICARE

## 2021-12-01 DIAGNOSIS — Z00.00 ENCOUNTER FOR PREVENTATIVE ADULT HEALTH CARE EXAMINATION: ICD-10-CM

## 2021-12-01 NOTE — TELEPHONE ENCOUNTER
Pt looking for letter to clear her to return to work after COVID.   She was out 11/9 through 11/23    Would like letter to e mail -  Ok for letter?    She also would like RF on omeprazole this has not been filled by PCP - ok for RF?    Marysol Moralez, RN

## 2021-12-01 NOTE — LETTER
2021      Sammie Ramirez  [address] [address]        To Whom It May Concern,     Please excuse Sammie Ramirez,  1975, from missing work 2021 through 21 as she tested positive for COVID on 2021.       Sincerely,         Lakeisha Albert PA-C

## 2021-12-02 RX ORDER — OMEPRAZOLE 40 MG/1
40 CAPSULE, DELAYED RELEASE ORAL DAILY
Qty: 90 CAPSULE | Refills: 3 | Status: SHIPPED | OUTPATIENT
Start: 2021-12-02 | End: 2023-02-20

## 2021-12-02 NOTE — TELEPHONE ENCOUNTER
Left message with  that letter was emailed to email on file and that refill requested was approved.  Stacey Tamez,

## 2021-12-08 ENCOUNTER — TELEPHONE (OUTPATIENT)
Dept: FAMILY MEDICINE | Facility: CLINIC | Age: 46
End: 2021-12-08
Payer: MEDICARE

## 2021-12-08 DIAGNOSIS — J32.9 SINUSITIS, UNSPECIFIED CHRONICITY, UNSPECIFIED LOCATION: Primary | ICD-10-CM

## 2021-12-08 NOTE — TELEPHONE ENCOUNTER
Spoke to pt with .    She is still not feeling well. Diagnosed with sinus infection back on 11/22 took zpack and is finished with this. She was not seen in the clinic for this. She was treated over the phone because she had COVID.     She does not have cough. No fever. She has sinus congestion/full sinuses. Breathing OK. Just a lot of pressure. Headache off and on. Feels like she has a head cold.     Believes she is recovered from COVID.     Previously zpack has not worked in the past. Wondering if she can get another abx.     She also needs a covid test before and after she goes to [place] on 12/29 -Jan 2.     Lyndsey Culver RN on 12/8/2021 at 3:14 PM    This encounter is being handled by a team outside your facility.  If action needs to be taken, please route the encounter back to your team.

## 2021-12-09 RX ORDER — DOXYCYCLINE HYCLATE 100 MG
100 TABLET ORAL 2 TIMES DAILY
Qty: 14 TABLET | Refills: 0 | Status: SHIPPED | OUTPATIENT
Start: 2021-12-09 | End: 2022-01-26

## 2021-12-09 NOTE — TELEPHONE ENCOUNTER
"Called and spoke with patient via . Relayed provider message below, informed of ABX sent to pharmacy. Patient verbalized understanding. Patient is upset and states we should change our policy regarding COVID tests for travel stating that this has to do with patient safety, \"why wouldn't you test for that\". RN stated we agreed that she should be tested if that is a part of her travel requirements but that we do not perform COVID tests for travel. Advised retail pharmacy or testing through [facility]. Patient verbalized understanding and will attempt to schedule testing through those sites. No further questions or concerns.     Davin RODAS RN    "

## 2021-12-09 NOTE — TELEPHONE ENCOUNTER
We don't do covid tests for travel.  She will have to go to a retail pharmacy of [facility] site.  I will send in new antibiotic.

## 2021-12-20 DIAGNOSIS — J32.9 SINUSITIS, UNSPECIFIED CHRONICITY, UNSPECIFIED LOCATION: ICD-10-CM

## 2021-12-20 NOTE — TELEPHONE ENCOUNTER
Pt calls and says usually she gets a fluconazole with her abx. She did not this time and is starting a yeast infection. Can you Rx? Lyndsey Culver RN on 12/20/2021 at 4:12 PM

## 2021-12-21 RX ORDER — FLUCONAZOLE 150 MG/1
150 TABLET ORAL ONCE
Qty: 1 TABLET | Refills: 0 | Status: SHIPPED | OUTPATIENT
Start: 2021-12-21 | End: 2021-12-21

## 2022-01-06 ENCOUNTER — LAB (OUTPATIENT)
Dept: LAB | Facility: CLINIC | Age: 47
End: 2022-01-06
Attending: FAMILY MEDICINE

## 2022-01-06 ENCOUNTER — VIRTUAL VISIT (OUTPATIENT)
Dept: FAMILY MEDICINE | Facility: CLINIC | Age: 47
End: 2022-01-06
Payer: MEDICARE

## 2022-01-06 ENCOUNTER — TELEPHONE (OUTPATIENT)
Dept: FAMILY MEDICINE | Facility: CLINIC | Age: 47
End: 2022-01-06

## 2022-01-06 DIAGNOSIS — Z20.822 EXPOSURE TO COVID-19 VIRUS: Primary | ICD-10-CM

## 2022-01-06 DIAGNOSIS — Z20.822 EXPOSURE TO COVID-19 VIRUS: ICD-10-CM

## 2022-01-06 PROCEDURE — 99441 PR PHYSICIAN TELEPHONE EVALUATION 5-10 MIN: CPT | Mod: 95 | Performed by: FAMILY MEDICINE

## 2022-01-06 PROCEDURE — U0005 INFEC AGEN DETEC AMPLI PROBE: HCPCS

## 2022-01-06 PROCEDURE — U0003 INFECTIOUS AGENT DETECTION BY NUCLEIC ACID (DNA OR RNA); SEVERE ACUTE RESPIRATORY SYNDROME CORONAVIRUS 2 (SARS-COV-2) (CORONAVIRUS DISEASE [COVID-19]), AMPLIFIED PROBE TECHNIQUE, MAKING USE OF HIGH THROUGHPUT TECHNOLOGIES AS DESCRIBED BY CMS-2020-01-R: HCPCS

## 2022-01-06 NOTE — PROGRESS NOTES
Sammie is a 46 year old who is being evaluated via a billable telephone visit.      What phone number would you like to be contacted at?[phone]   How would you like to obtain your AVS? Mail a copy    Assessment & Plan     Exposure to COVID-19 virus  - Asymptomatic COVID-19 Virus (Coronavirus) by PCR           No follow-ups on file.    Umu Benavidez MD  [facility]   Sammie is a 46 year old who presents for the following health issues     HPI   Around covid positive.  No symptoms.             Review of Systems   Constitutional, HEENT, cardiovascular, pulmonary, gi and gu systems are negative, except as otherwise noted.      Objective           Vitals:  No vitals were obtained today due to virtual visit.    Physical Exam   healthy, alert and no distress  PSYCH: Alert and oriented times 3; coherent speech, normal   rate and volume, able to articulate logical thoughts, able   to abstract reason, no tangential thoughts, no hallucinations   or delusions  Her affect is normal  RESP: No cough, no audible wheezing, able to talk in full sentences  Remainder of exam unable to be completed due to telephone visits                Phone call duration: 5 minutes

## 2022-01-07 LAB — SARS-COV-2 RNA RESP QL NAA+PROBE: NEGATIVE

## 2022-01-26 ENCOUNTER — OFFICE VISIT (OUTPATIENT)
Dept: VASCULAR SURGERY | Facility: CLINIC | Age: 47
End: 2022-01-26
Attending: SURGERY
Payer: MEDICARE

## 2022-01-26 ENCOUNTER — ANCILLARY PROCEDURE (OUTPATIENT)
Dept: ULTRASOUND IMAGING | Facility: CLINIC | Age: 47
End: 2022-01-26
Attending: SURGERY
Payer: MEDICARE

## 2022-01-26 DIAGNOSIS — I83.813 VARICOSE VEINS OF BOTH LOWER EXTREMITIES WITH PAIN: ICD-10-CM

## 2022-01-26 DIAGNOSIS — I83.813 VARICOSE VEINS OF BOTH LOWER EXTREMITIES WITH PAIN: Primary | ICD-10-CM

## 2022-01-26 PROCEDURE — 93970 EXTREMITY STUDY: CPT | Performed by: SURGERY

## 2022-01-26 PROCEDURE — 99213 OFFICE O/P EST LOW 20 MIN: CPT | Performed by: SURGERY

## 2022-01-26 NOTE — LETTER
1/26/2022         RE: Sammie Ramirez  [address] [address]        Dear Colleague,    Thank you for referring your patient, Sammie Ramirez, to the [facility] [place]. Please see a copy of my visit note below.    [facility] [place] office note  Sammie Ramirez returns with recurrent, bilateral lower extremity varicose veins and leg pain.  I last saw her on 9/8/2021.  Please see my consultation for details.  She does have some pain in her legs with bilateral cramps which are of concern to her.  She has had bilateral vein stripping and, later, bilateral ablations in the past.    We performed a bilateral lower extremity venous competency study today to see if there is underlying actual incompetence contributing to the process.    Physical exam  Pleasant female in no acute distress  She has small varicosities over the posterior medial calves bilaterally.  No edema or stasis changes.    Ultrasound  INDICATION:  Patient is referred for varicose veins with pain and swelling.      EXAM TYPE  BILATERAL LOWER EXTREMITY VENOUS DUPLEX FOR VENOUS INSUFFICIENCY  TECHNICAL SUMMARY     A duplex ultrasound study using color flow was performed, to evaluate the bilateral lower extremity veins for valvular incompetence with the patient in a steep reversed trendelenberg.      RIGHT:     The deep veins demonstrate phasic flow, compress and respond to augmentations.  There is no reflux or DVT.      Absent segments of the GSV are noted from the SFJ to the mid calf. The remaining GSV demonstrates phasic flow, compresses and responds to augmentations with no evidence of thrombus. The great saphenous vein measures 1.9 mm at the mid calf. The GSV is incompetent from Mid Calf to Distal Calf, with the greatest reflux time of 7836 milliseconds.        The AASV is incompetent ( 2.3 mm) draining into the saphenofemoral junction. The AASV is incompetent from the saphenofemoal junction to the distal thigh with a  reflux time of 8842 milliseconds. The AASV takes a straight course for 20 cm.      Absent segments of the Giacomini vein are noted above the distal thigh.The Giacomini vein is incompetent ( 2.0 mm) communicating with the small saphenous vein at the knee level. The Giacomini vein is incompetent at the distal thigh with a reflux time of 660 milliseconds.      The SSV is absent. The saphenopopliteal junction is absent.      Perforators: there is no evidence of incompetent  veins at any level.      Medial calf varicosity is noted measuring 1.6 mm, tech unable to determine source.         LEFT:     The deep veins demonstrate phasic flow, compress and respond to augmentations.  There is no reflux or DVT.      Segments of the left GSV are absent from the SFJ to the mid calf. The remaining GSV demonstrates phasic flow, compresses and responds to augmentations with no evidence of thrombus. The great saphenous vein measures 2.3 mm at the mid calf. The GSV is incompetent from Mid Calf to Distal Calf, with the greatest reflux time of 8446 milliseconds.  The GSV gives rise to a varicose branch measuring 2.3 mm off the  Mid Calf that courses Medial with a reflux time of 7325 milliseconds.         The AASV is competent ( 2.0 mm) draining into the saphenofemoral junction. The AASV takes a straight course for 5 cm.      The Giacomini vein is incompetent ( 8397 mm) communicating with the small saphenous vein at the knee level. The Giacomini vein is incompetent from the Proximal Thigh to distal thigh with a reflux time of 8397 milliseconds. Mid thigh Giacomini vein leaves the sheath and becomes superficial.      The SSV demonstrates phasic flow, compresses and responds to augmentations from the popliteal space to the ankle.  No thrombus is seen. The saphenopopliteal junction is competent ( 3.0 mm). The SSV is incompetent from the Proximal Calf to Mid Calf with a reflux time of 9799 milliseconds.       Perforators: there is  no evidence of incompetent  veins at any level.         FINAL SUMMARY:  1.         No evidence of bilateral lower extremity deep vein thrombus.  2.        Absent segments of right great saphenous vein.   3.        Right great saphenous vein incompetence.  4.        Right anterior accessory saphenous vein incompetence.   5.        Right Giacomini vein incompetence.   6.        Absent segments of right Giacomini vein.  7.        Absent segments of right small saphenous vein.   8.        Absent segments of left great saphenous vein.   9.        Left great saphenous vein incompetence.  10.      Left vein of Giacomini incompetence.   11.      Left small saphenous vein incompetence.   12.      Left varicose vein incompetence.   13.      The time of incompetence is greater than 500 milliseconds in  and superficial veins and greater than 1000 milliseconds in deep veins.    Assessment  Limited superficial venous insufficiency, likely not of clinical concern.  Her great saphenous veins have been ablated bilaterally and the below-knee great saphenous vein incompetence is likely insignificant given the small size of the below-knee great saphenous veins.  The left medial calf varicose vein measures 2.3 mm in diameter with reflux time of 7.3 seconds.    Though she has left small saphenous vein and Vein of Giacomini incompetence, both are quite small and likely not contributing to symptoms.  Therefore I would not recommend treating the.    The option of continued conservative management with exercise, leg ovation, dietary measures and compression with small risks of superficial thrombophlebitis, bleeding and progression of the disease process were discussed.    If she wishes treatment, she would be best served by simple, medically necessary sclerotherapy of these recurrent medial calf varicosities.  I would perform this under ultrasound guidance.  Details of ultrasound-guided sclerotherapy including risks of  allergic reaction, deep vein thrombosis, superficial thrombophlebitis, hyperpigmentation and matting were discussed.  She understands that more than 1 session may be necessary.    I was also quite clear in explaining that treating these veins will likely not change her lower extremity symptoms.  Again, she voiced understanding.    Plan  Ultrasound-guided, medically necessary sclerotherapy of recurrent bilateral medial calf varicose veins.    KAEL Tyler MD    Dictated using Dragon voice recognition software which may result in transcription errors        VEIN CLINIC LEG DRAWING:                  Again, thank you for allowing me to participate in the care of your patient.        Sincerely,        Kaiden Tyler MD

## 2022-01-26 NOTE — PROGRESS NOTES
[facility] [place] office note  Sammie Ramirez returns with recurrent, bilateral lower extremity varicose veins and leg pain.  I last saw her on 9/8/2021.  Please see my consultation for details.  She does have some pain in her legs with bilateral cramps which are of concern to her.  She has had bilateral vein stripping and, later, bilateral ablations in the past.    We performed a bilateral lower extremity venous competency study today to see if there is underlying actual incompetence contributing to the process.    Physical exam  Pleasant female in no acute distress  She has small varicosities over the posterior medial calves bilaterally.  No edema or stasis changes.    Ultrasound  INDICATION:  Patient is referred for varicose veins with pain and swelling.      EXAM TYPE  BILATERAL LOWER EXTREMITY VENOUS DUPLEX FOR VENOUS INSUFFICIENCY  TECHNICAL SUMMARY     A duplex ultrasound study using color flow was performed, to evaluate the bilateral lower extremity veins for valvular incompetence with the patient in a steep reversed trendelenberg.      RIGHT:     The deep veins demonstrate phasic flow, compress and respond to augmentations.  There is no reflux or DVT.      Absent segments of the GSV are noted from the SFJ to the mid calf. The remaining GSV demonstrates phasic flow, compresses and responds to augmentations with no evidence of thrombus. The great saphenous vein measures 1.9 mm at the mid calf. The GSV is incompetent from Mid Calf to Distal Calf, with the greatest reflux time of 7836 milliseconds.        The AASV is incompetent ( 2.3 mm) draining into the saphenofemoral junction. The AASV is incompetent from the saphenofemoal junction to the distal thigh with a reflux time of 8842 milliseconds. The AASV takes a straight course for 20 cm.      Absent segments of the Giacomini vein are noted above the distal thigh.The Giacomini vein is incompetent ( 2.0 mm) communicating with the small saphenous vein at  the knee level. The Giacomini vein is incompetent at the distal thigh with a reflux time of 660 milliseconds.      The SSV is absent. The saphenopopliteal junction is absent.      Perforators: there is no evidence of incompetent  veins at any level.      Medial calf varicosity is noted measuring 1.6 mm, tech unable to determine source.         LEFT:     The deep veins demonstrate phasic flow, compress and respond to augmentations.  There is no reflux or DVT.      Segments of the left GSV are absent from the SFJ to the mid calf. The remaining GSV demonstrates phasic flow, compresses and responds to augmentations with no evidence of thrombus. The great saphenous vein measures 2.3 mm at the mid calf. The GSV is incompetent from Mid Calf to Distal Calf, with the greatest reflux time of 8446 milliseconds.  The GSV gives rise to a varicose branch measuring 2.3 mm off the  Mid Calf that courses Medial with a reflux time of 7325 milliseconds.         The AASV is competent ( 2.0 mm) draining into the saphenofemoral junction. The AASV takes a straight course for 5 cm.      The Giacomini vein is incompetent ( 8397 mm) communicating with the small saphenous vein at the knee level. The Giacomini vein is incompetent from the Proximal Thigh to distal thigh with a reflux time of 8397 milliseconds. Mid thigh Giacomini vein leaves the sheath and becomes superficial.      The SSV demonstrates phasic flow, compresses and responds to augmentations from the popliteal space to the ankle.  No thrombus is seen. The saphenopopliteal junction is competent ( 3.0 mm). The SSV is incompetent from the Proximal Calf to Mid Calf with a reflux time of 9799 milliseconds.       Perforators: there is no evidence of incompetent  veins at any level.         FINAL SUMMARY:  1.         No evidence of bilateral lower extremity deep vein thrombus.  2.        Absent segments of right great saphenous vein.   3.        Right great saphenous  vein incompetence.  4.        Right anterior accessory saphenous vein incompetence.   5.        Right Giacomini vein incompetence.   6.        Absent segments of right Giacomini vein.  7.        Absent segments of right small saphenous vein.   8.        Absent segments of left great saphenous vein.   9.        Left great saphenous vein incompetence.  10.      Left vein of Giacomini incompetence.   11.      Left small saphenous vein incompetence.   12.      Left varicose vein incompetence.   13.      The time of incompetence is greater than 500 milliseconds in  and superficial veins and greater than 1000 milliseconds in deep veins.    Assessment  Limited superficial venous insufficiency, likely not of clinical concern.  Her great saphenous veins have been ablated bilaterally and the below-knee great saphenous vein incompetence is likely insignificant given the small size of the below-knee great saphenous veins.  The left medial calf varicose vein measures 2.3 mm in diameter with reflux time of 7.3 seconds.    Though she has left small saphenous vein and Vein of Giacomini incompetence, both are quite small and likely not contributing to symptoms.  Therefore I would not recommend treating the.    The option of continued conservative management with exercise, leg ovation, dietary measures and compression with small risks of superficial thrombophlebitis, bleeding and progression of the disease process were discussed.    If she wishes treatment, she would be best served by simple, medically necessary sclerotherapy of these recurrent medial calf varicosities.  I would perform this under ultrasound guidance.  Details of ultrasound-guided sclerotherapy including risks of allergic reaction, deep vein thrombosis, superficial thrombophlebitis, hyperpigmentation and matting were discussed.  She understands that more than 1 session may be necessary.    I was also quite clear in explaining that treating these veins will  likely not change her lower extremity symptoms.  Again, she voiced understanding.    Plan  Ultrasound-guided, medically necessary sclerotherapy of recurrent bilateral medial calf varicose veins.    C Ghazal Tyler MD    Dictated using Dragon voice recognition software which may result in transcription errors

## 2022-02-03 ENCOUNTER — NURSE TRIAGE (OUTPATIENT)
Dept: FAMILY MEDICINE | Facility: CLINIC | Age: 47
End: 2022-02-03
Payer: MEDICARE

## 2022-02-03 DIAGNOSIS — J32.9 SINUSITIS, UNSPECIFIED CHRONICITY, UNSPECIFIED LOCATION: Primary | ICD-10-CM

## 2022-02-03 NOTE — TELEPHONE ENCOUNTER
"S-(situation): Patient calling requesting referral to ENT regarding migraine and nose symptoms.     B-(background): Patient has hx of migraines.     A-(assessment): Patient states \"i've had headaches all this week\". Patient experienced migraine yesterday as well as two days ago. Lasting two hours. Patient has been taking medications as prescribed, per patient \"i'm still having symptoms that I had last time\". Patient currently has headache, started early this morning, states \"i'm a lot better since taking rizatriptan as well as ondansetron\". Per patient, \"my nose is inflamed, from weeks ago, painful to touch my right nostril, i've had nosebleeds recently\". Patient's symptoms have improved today, wants ENT referral as symptoms have been ongoing this week.     R-(recommendations): Per huddle with PCP, referral to ENT may be placed. Offered alternative medication for migraines, patient declined. Routing to PCP to review and place referral.       Reason for Disposition    Headache is a chronic symptom (recurrent or ongoing AND lasting > 4 weeks)    Additional Information    Negative: Headache started during exertion (e.g., sex, strenuous exercise, heavy lifting)    Negative: Unexplained headache that is present > 24 hours    Negative: New headache and age > 50    Negative: New headache and weak immune system (e.g., HIV positive, cancer chemo, splenectomy, organ transplant, chronic steroids)    Negative: Fever present > 3 days (72 hours)    Negative: Patient wants to be seen    Negative: SEVERE headache (e.g., excruciating) and has had severe headaches before    Negative: SEVERE headache and not relieved by pain meds    Negative: SEVERE headache and vomiting    Negative: SEVERE headache and fever    Negative: Fever > 103 F (39.4 C)    Negative: Fever > 100.0 F (37.8 C) and has diabetes mellitus or a weak immune system (e.g., HIV positive, cancer chemotherapy, organ transplant, splenectomy, chronic steroids)    Negative: " "SEVERE headache, states 'worst headache' of life    Negative: SEVERE headache, sudden-onset (i.e., reaching maximum intensity within seconds to 1 hour)    Negative: Severe pain in one eye    Negative: Loss of vision or double vision (Exception: same as prior migraines)    Negative: Patient sounds very sick or weak to the triager    Negative: Unable to walk without falling    Negative: Stiff neck (can't touch chin to chest)    Negative: Possibility of carbon monoxide exposure    Negative: Difficult to awaken or acting confused (e.g., disoriented, slurred speech)    Negative: Weakness of the face, arm or leg on one side of the body and new onset    Negative: Numbness of the face, arm or leg on one side of the body and new onset    Negative: Loss of speech or garbled speech and new onset    Negative: Passed out (i.e., fainted, collapsed and was not responding)    Negative: Sounds like a life-threatening emergency to the triager    Negative: Followed a head injury within last 3 days    Negative: Traumatic Brain Injury (TBI) is suspected    Negative: Sinus pain of forehead and yellow or green nasal discharge    Negative: Pregnant    Answer Assessment - Initial Assessment Questions  1. LOCATION: \"Where does it hurt?\"       headache  2. ONSET: \"When did the headache start?\" (Minutes, hours or days)       Has had ongoing this past week  3. PATTERN: \"Does the pain come and go, or has it been constant since it started?\"      Comes and goes  4. SEVERITY: \"How bad is the pain?\" and \"What does it keep you from doing?\"  (e.g., Scale 1-10; mild, moderate, or severe)    - MILD (1-3): doesn't interfere with normal activities     - MODERATE (4-7): interferes with normal activities or awakens from sleep     - SEVERE (8-10): excruciating pain, unable to do any normal activities         Mild now  5. RECURRENT SYMPTOM: \"Have you ever had headaches before?\" If so, ask: \"When was the last time?\" and \"What happened that time?\"       Yes, hx " "of migraines  6. CAUSE: \"What do you think is causing the headache?\"      Migraine, unsure cause  7. MIGRAINE: \"Have you been diagnosed with migraine headaches?\" If so, ask: \"Is this headache similar?\"       yes  8. HEAD INJURY: \"Has there been any recent injury to the head?\"       no  9. OTHER SYMPTOMS: \"Do you have any other symptoms?\" (fever, stiff neck, eye pain, sore throat, cold symptoms)      Nosebleeds, right side nostril tender to touch/inflamed  10. PREGNANCY: \"Is there any chance you are pregnant?\" \"When was your last menstrual period?\"        no    Protocols used: HEADACHE-A-[place]    Davin RODAS RN    "

## 2022-02-03 NOTE — TELEPHONE ENCOUNTER
Attempted to reach patient to relay referral information, however, technical issues prevented full message relay. Attempted to reach patient again, LVM with referral information. If call back, please let patient know referral information below.     Davin RODAS RN

## 2022-02-03 NOTE — TELEPHONE ENCOUNTER
Referral placed:    location   Order: Otolaryngology Referral    [facility] [address]   Phone: [phone]

## 2022-03-14 NOTE — PROGRESS NOTES
"ASSESSMENT & PLAN  Patient Instructions     1. Right elbow pain    2. Lateral epicondylitis of right elbow      -Patient has right elbow pain due to significant tendinitis  -Patient requested and tolerated right lateral epicondyle cortisone injection today without complications.  Patient was given postprocedure instructions  -Patient was her formal hand therapy and home exercise program in 1 to 2 weeks  -Patient may purchase a wrist brace to minimize extension with day-to-day activities  -Patient will follow up if pain does not improve or returns  -Call direct clinic number [[phone]] at any time with questions or concerns.    Albert Yeo MD [facility]  [facility]          -----    SUBJECTIVE  Sammie Ramirez is a/an 46 year old Right handed female who is seen as a self referral for evaluation of right elbow pain. The patient is seen with an .  Patient complaints of deep, burning pain in her lateral elbow for the past 1 month, worse at night, wakes her up in the middle of the night, elbow \"locks\" and gets stuck     Onset: 1 month(s) ago. Reports insidious onset without acute precipitating event.  Location of Pain: right lateral elbow - \"deep\" in the elbow joint  Rating of Pain at worst: 10/10  Rating of Pain Currently: 9/10  Worsened by: working, lifting things   Better with: warm bath  Treatments tried: Lidocaine, Aspercreme, Bengay, warm bath, rest, activity avoidance, ibuprofen, Tylenol   Associated symptoms: burning sensation, locking, stiffness, some numbness in the middle of the night  Orthopedic history: NO  Relevant surgical history: NO  Social history: social history: works as a PCA     Past Medical History:   Diagnosis Date     ASCUS (atypical squamous cells of undetermined significance) on Pap smear 10/13/2016    Neg HPV     History of colposcopy 06/14/2018 5/24/21     LSIL (low grade squamous intraepithelial lesion) on Pap smear " "02/01/2014     Social History     Socioeconomic History     Marital status:      Spouse name: Not on file     Number of children: Not on file     Years of education: Not on file     Highest education level: Not on file   Occupational History     Not on file   Tobacco Use     Smoking status: Never Smoker     Smokeless tobacco: Never Used   Vaping Use     Vaping Use: Never used   Substance and Sexual Activity     Alcohol use: Yes     Comment: wine     Drug use: No     Sexual activity: Not Currently     Partners: Male   Other Topics Concern     Parent/sibling w/ CABG, MI or angioplasty before 65F 55M? No   Social History Narrative     Not on file     Social Determinants of Health     Financial Resource Strain: Not on file   Food Insecurity: Not on file   Transportation Needs: Not on file   Physical Activity: Not on file   Stress: Not on file   Social Connections: Not on file   Intimate Partner Violence: Not on file   Housing Stability: Not on file       Patient's past medical, surgical, social, and family histories were reviewed today and no changes are noted.    REVIEW OF SYSTEMS:  10 point ROS is negative other than symptoms noted above in HPI, Past Medical History or as stated below  Constitutional: NEGATIVE for fever, chills, change in weight  Skin: NEGATIVE for worrisome rashes, moles or lesions  GI/: NEGATIVE for bowel or bladder changes  Neuro: NEGATIVE for weakness, dizziness or paresthesias    OBJECTIVE:  /78   Ht 1.626 m (5' 4\")   Wt 66.2 kg (146 lb)   BMI 25.06 kg/m     General: healthy, alert and in no distress  HEENT: no scleral icterus or conjunctival erythema  Skin: no suspicious lesions or rash. No jaundice.  CV: regular rhythm by palpation  Resp: normal respiratory effort without conversational dyspnea   Psych: normal mood and affect  Gait: normal steady gait with appropriate coordination and balance  Neuro: Normal sensory exam of bilateral hands.   MSK:  RIGHT ELBOW  Inspection:    " No swelling, bruising, discoloration, or obvious deformity or asymmetry  Palpation:    Tender about the lateral epicondyle, common extensor tendon, extensor muscle of forearm. Remainder of bony, ligamentous and tendinous landmarks are nontender.    Crepitus is AbsentfullRange of Motion:     Extension full / flexion full / pronation full / supination full  Strength:    Flexion limited slightly by pain pronation limited slightly by pain supination limited slightly by pain  Special Tests:    Positive: Pain with resisted wrist extension, pain with resisted middle finger extension, pain with resisted supination, pain with resisted pronation    Negative: pain with resisted wrist flexion    Independent visualization of the below image:  Recent Results (from the past 24 hour(s))   XR Elbow Right G/E 3 Views    Narrative    No acute fracture, dislocation or osseous abnormalities.     Medium Joint Injection/Arthrocentesis: R elbow    Date/Time: 3/23/2022 4:58 PM  Performed by: Yeo, Albert, MD  Authorized by: Yeo, Albert, MD     Indications:  Pain  Needle Size:  25 G  Approach:  Lateral  Location:  Elbow  Location comment:  Lateral epicondyle  Site:  R elbow  Medications:  40 mg methylPREDNISolone 40 MG/ML  Outcome:  Tolerated well, no immediate complications  Procedure discussed: discussed risks, benefits, and alternatives    Consent Given by:  Patient  Timeout: timeout called immediately prior to procedure    Prep: patient was prepped and draped in usual sterile fashion            Albert Yeo MD [facility]

## 2022-03-23 ENCOUNTER — OFFICE VISIT (OUTPATIENT)
Dept: ORTHOPEDICS | Facility: CLINIC | Age: 47
End: 2022-03-23
Payer: MEDICARE

## 2022-03-23 ENCOUNTER — ANCILLARY PROCEDURE (OUTPATIENT)
Dept: GENERAL RADIOLOGY | Facility: CLINIC | Age: 47
End: 2022-03-23
Attending: FAMILY MEDICINE
Payer: MEDICARE

## 2022-03-23 VITALS
HEIGHT: 64 IN | WEIGHT: 146 LBS | SYSTOLIC BLOOD PRESSURE: 124 MMHG | BODY MASS INDEX: 24.92 KG/M2 | DIASTOLIC BLOOD PRESSURE: 78 MMHG

## 2022-03-23 DIAGNOSIS — M77.11 LATERAL EPICONDYLITIS OF RIGHT ELBOW: ICD-10-CM

## 2022-03-23 DIAGNOSIS — M25.521 RIGHT ELBOW PAIN: Primary | ICD-10-CM

## 2022-03-23 DIAGNOSIS — M25.521 RIGHT ELBOW PAIN: ICD-10-CM

## 2022-03-23 PROCEDURE — 20551 NJX 1 TENDON ORIGIN/INSJ: CPT | Mod: RT | Performed by: FAMILY MEDICINE

## 2022-03-23 PROCEDURE — 99214 OFFICE O/P EST MOD 30 MIN: CPT | Mod: 25 | Performed by: FAMILY MEDICINE

## 2022-03-23 PROCEDURE — 73080 X-RAY EXAM OF ELBOW: CPT | Mod: RT | Performed by: FAMILY MEDICINE

## 2022-03-23 PROCEDURE — T1013 SIGN LANG/ORAL INTERPRETER: HCPCS | Mod: U3

## 2022-03-23 RX ORDER — METHYLPREDNISOLONE ACETATE 40 MG/ML
40 INJECTION, SUSPENSION INTRA-ARTICULAR; INTRALESIONAL; INTRAMUSCULAR; SOFT TISSUE
Status: DISCONTINUED | OUTPATIENT
Start: 2022-03-23 | End: 2022-06-09

## 2022-03-23 RX ADMIN — METHYLPREDNISOLONE ACETATE 40 MG: 40 INJECTION, SUSPENSION INTRA-ARTICULAR; INTRALESIONAL; INTRAMUSCULAR; SOFT TISSUE at 16:58

## 2022-03-23 NOTE — PATIENT INSTRUCTIONS
1. Right elbow pain    2. Lateral epicondylitis of right elbow      -Patient has right elbow pain due to significant tendinitis  -Patient requested and tolerated right lateral epicondyle cortisone injection today without complications.  Patient was given postprocedure instructions  -Patient was her formal hand therapy and home exercise program in 1 to 2 weeks  -Patient may purchase a wrist brace to minimize extension with day-to-day activities  -Patient will follow up if pain does not improve or returns  -Call direct clinic number [[phone]] at any time with questions or concerns.    Albert Yeo MD [facility]  [facility] [place]

## 2022-03-23 NOTE — LETTER
"    3/23/2022         RE: Sammie Ramirez  [address] [address]        Dear Colleague,    Thank you for referring your patient, Sammie Ramirez, to the [facility] [place]. Please see a copy of my visit note below.    ASSESSMENT & PLAN  Patient Instructions     1. Right elbow pain    2. Lateral epicondylitis of right elbow      -Patient has right elbow pain due to significant tendinitis  -Patient requested and tolerated right lateral epicondyle cortisone injection today without complications.  Patient was given postprocedure instructions  -Patient was her formal hand therapy and home exercise program in 1 to 2 weeks  -Patient may purchase a wrist brace to minimize extension with day-to-day activities  -Patient will follow up if pain does not improve or returns  -Call direct clinic number [[phone]] at any time with questions or concerns.    Albert Yeo MD [facility] Orthopedics and Sports Medicine  [facility] [place]          -----    SUBJECTIVE  Sammie Ramirez is a/an 46 year old Right handed female who is seen as a self referral for evaluation of right elbow pain. The patient is seen with an .  Patient complaints of deep, burning pain in her lateral elbow for the past 1 month, worse at night, wakes her up in the middle of the night, elbow \"locks\" and gets stuck     Onset: 1 month(s) ago. Reports insidious onset without acute precipitating event.  Location of Pain: right lateral elbow - \"deep\" in the elbow joint  Rating of Pain at worst: 10/10  Rating of Pain Currently: 9/10  Worsened by: working, lifting things   Better with: warm bath  Treatments tried: Lidocaine, Aspercreme, Bengay, warm bath, rest, activity avoidance, ibuprofen, Tylenol   Associated symptoms: burning sensation, locking, stiffness, some numbness in the middle of the night  Orthopedic history: NO  Relevant surgical history: NO  Social history: social history: works as a " "PCA     Past Medical History:   Diagnosis Date     ASCUS (atypical squamous cells of undetermined significance) on Pap smear 10/13/2016    Neg HPV     History of colposcopy 06/14/2018 5/24/21     LSIL (low grade squamous intraepithelial lesion) on Pap smear 02/01/2014     Social History     Socioeconomic History     Marital status:      Spouse name: Not on file     Number of children: Not on file     Years of education: Not on file     Highest education level: Not on file   Occupational History     Not on file   Tobacco Use     Smoking status: Never Smoker     Smokeless tobacco: Never Used   Vaping Use     Vaping Use: Never used   Substance and Sexual Activity     Alcohol use: Yes     Comment: wine     Drug use: No     Sexual activity: Not Currently     Partners: Male   Other Topics Concern     Parent/sibling w/ CABG, MI or angioplasty before 65F 55M? No   Social History Narrative     Not on file     Social Determinants of Health     Financial Resource Strain: Not on file   Food Insecurity: Not on file   Transportation Needs: Not on file   Physical Activity: Not on file   Stress: Not on file   Social Connections: Not on file   Intimate Partner Violence: Not on file   Housing Stability: Not on file       Patient's past medical, surgical, social, and family histories were reviewed today and no changes are noted.    REVIEW OF SYSTEMS:  10 point ROS is negative other than symptoms noted above in HPI, Past Medical History or as stated below  Constitutional: NEGATIVE for fever, chills, change in weight  Skin: NEGATIVE for worrisome rashes, moles or lesions  GI/: NEGATIVE for bowel or bladder changes  Neuro: NEGATIVE for weakness, dizziness or paresthesias    OBJECTIVE:  /78   Ht 1.626 m (5' 4\")   Wt 66.2 kg (146 lb)   BMI 25.06 kg/m     General: healthy, alert and in no distress  HEENT: no scleral icterus or conjunctival erythema  Skin: no suspicious lesions or rash. No jaundice.  CV: regular rhythm " by palpation  Resp: normal respiratory effort without conversational dyspnea   Psych: normal mood and affect  Gait: normal steady gait with appropriate coordination and balance  Neuro: Normal sensory exam of bilateral hands.   MSK:  RIGHT ELBOW  Inspection:    No swelling, bruising, discoloration, or obvious deformity or asymmetry  Palpation:    Tender about the lateral epicondyle, common extensor tendon, extensor muscle of forearm. Remainder of bony, ligamentous and tendinous landmarks are nontender.    Crepitus is AbsentfullRange of Motion:     Extension full / flexion full / pronation full / supination full  Strength:    Flexion limited slightly by pain pronation limited slightly by pain supination limited slightly by pain  Special Tests:    Positive: Pain with resisted wrist extension, pain with resisted middle finger extension, pain with resisted supination, pain with resisted pronation    Negative: pain with resisted wrist flexion    Independent visualization of the below image:  Recent Results (from the past 24 hour(s))   XR Elbow Right G/E 3 Views    Narrative    No acute fracture, dislocation or osseous abnormalities.     Medium Joint Injection/Arthrocentesis: R elbow    Date/Time: 3/23/2022 4:58 PM  Performed by: Yeo, Albert, MD  Authorized by: Yeo, Albert, MD     Indications:  Pain  Needle Size:  25 G  Approach:  Lateral  Location:  Elbow  Location comment:  Lateral epicondyle  Site:  R elbow  Medications:  40 mg methylPREDNISolone 40 MG/ML  Outcome:  Tolerated well, no immediate complications  Procedure discussed: discussed risks, benefits, and alternatives    Consent Given by:  Patient  Timeout: timeout called immediately prior to procedure    Prep: patient was prepped and draped in usual sterile fashion            Albert Yeo MD [facility]        Again, thank you for allowing me to participate in the care of your patient.        Sincerely,        Albert Yeo, MD

## 2022-04-05 DIAGNOSIS — G43.909 MIGRAINE WITHOUT STATUS MIGRAINOSUS, NOT INTRACTABLE, UNSPECIFIED MIGRAINE TYPE: ICD-10-CM

## 2022-04-06 RX ORDER — IBUPROFEN 800 MG/1
TABLET, FILM COATED ORAL
Qty: 90 TABLET | Refills: 0 | Status: SHIPPED | OUTPATIENT
Start: 2022-04-06 | End: 2022-09-19

## 2022-04-06 NOTE — TELEPHONE ENCOUNTER
Routing refill request to provider for review/approval because:  Labs not current:  ALT, AST, CBC, Cr  Ongoing Rx?     Lab Results   Component Value Date    ALT 29 03/01/2021      AST   Date Value Ref Range Status   03/01/2021 21 0 - 45 U/L Final    CBC RESULTS:   Recent Labs   Lab Test 03/01/21  0843   WBC 4.9   RBC 4.65   HGB 13.8   HCT 41.4   MCV 89   MCH 29.7   MCHC 33.3   RDW 12.8         Creatinine   Date Value Ref Range Status   03/01/2021 0.78 0.52 - 1.04 mg/dL Final      Karrie READ RN

## 2022-04-07 ENCOUNTER — TELEPHONE (OUTPATIENT)
Dept: FAMILY MEDICINE | Facility: CLINIC | Age: 47
End: 2022-04-07
Payer: MEDICARE

## 2022-04-07 DIAGNOSIS — G43.009 MIGRAINE WITHOUT AURA AND WITHOUT STATUS MIGRAINOSUS, NOT INTRACTABLE: ICD-10-CM

## 2022-04-07 NOTE — TELEPHONE ENCOUNTER
Reason for Call:  Medication or medication refill:    Do you use a [facility]?  Name of the pharmacy and phone number for the current request[facility]    Name of the medication requested: sumatriptan and rizatriptan    Other request: patient states pharmacy will not fill both medications. Patient states she wants to take both as the rizatriptan keeps her up at night so she would like that one for daytime and the sumatriptan for at night. Please call her to discuss. She will also need refill on her ibuprofen    Can we leave a detailed message on this number? no    Phone number patient can be reached at: Home number on file [phone] (home)    Best Time: any    Call taken on 4/7/2022 at 11:20 AM by Essie Boyd

## 2022-04-08 NOTE — TELEPHONE ENCOUNTER
"Pt is calling back. Pt states they do not like taking Sumatriptan during the day because it makes pt sleepy. Pt takes Rizatriptan during the day if gets a migraine because it dose not cause drowsiness. Pt states she only takes medication PRN, states \"my migraines have been better lately, I have not needed them for a little bit but I would like them on hand in case I do need them.\" Pt is aware of the Ibuprofen refill and has already collected from pharmacy.     Karrie READ RN    "

## 2022-04-10 RX ORDER — RIZATRIPTAN BENZOATE 10 MG/1
10 TABLET ORAL
Qty: 18 TABLET | Refills: 1 | Status: SHIPPED | OUTPATIENT
Start: 2022-04-10 | End: 2023-08-09

## 2022-04-10 RX ORDER — SUMATRIPTAN 100 MG/1
100 TABLET, FILM COATED ORAL
Qty: 9 TABLET | Refills: 3 | Status: SHIPPED | OUTPATIENT
Start: 2022-04-10 | End: 2023-05-03

## 2022-04-10 NOTE — TELEPHONE ENCOUNTER
I need to know how many times a week she is getting migraines and taking medication.  I'm not sure insurance will allow her to fill both but I will send them.

## 2022-04-11 NOTE — TELEPHONE ENCOUNTER
Patient called back, states she hasn't had one in awhile.     Advised medication was sent to pharmacy.

## 2022-04-12 DIAGNOSIS — K64.4 EXTERNAL HEMORRHOIDS: ICD-10-CM

## 2022-04-13 ENCOUNTER — OFFICE VISIT (OUTPATIENT)
Dept: VASCULAR SURGERY | Facility: CLINIC | Age: 47
End: 2022-04-13
Payer: MEDICARE

## 2022-04-13 DIAGNOSIS — I83.812 VARICOSE VEINS OF LEFT LOWER EXTREMITY WITH PAIN: ICD-10-CM

## 2022-04-13 DIAGNOSIS — I83.811 VARICOSE VEINS OF RIGHT LOWER EXTREMITY WITH PAIN: ICD-10-CM

## 2022-04-13 PROCEDURE — 36470 NJX SCLRSNT 1 INCMPTNT VEIN: CPT | Mod: 50 | Performed by: SURGERY

## 2022-04-13 PROCEDURE — 76942 ECHO GUIDE FOR BIOPSY: CPT | Performed by: SURGERY

## 2022-04-13 NOTE — LETTER
4/13/2022         RE: Sammie Ramirez  [address] [address]        Dear Colleague,    Thank you for referring your patient, Sammie Ramirez, to the [facility] [place]. Please see a copy of my visit note below.        Vein Clinic Sclerotherapy Note     Indications:  Recurrent, symptomatic, bilateral lower extremity varicose veins; symptoms recalcitrant to conservative measures     Procedure:  1.  Ultrasound-guided, medically necessary sclerotherapy symptomatic, recurrent left posterior medial calf varicose veins  2.  Ultrasound-guided, medically necessary sclerotherapy symptomatic, recurrent right medial calf varicose veins     Procedure description  Details of procedure including risks of allergic reaction, deep vein thrombosis, ulceration, superficial thrombophlebitis and hyperpigmentation were discussed.  The patient voiced understanding and wished to proceed.  Informed consent was obtained.    I imaged the left posterior medial calf with ultrasound and noted a cluster of varicosities which coursed from a muscular .  I isolated this cluster approximately 45 cm from where it communicated with the prior ultrasound, directed 27-gauge needle into the veins and injecting them with 0.5 mL of 1% polidocanol foam filling them well.  I had the patient perform ankle pumps.    I then imaged the right medial calf and noted small varicosities in this location which coursing from a segment of the below-knee great saphenous vein.  I isolated the below-knee great saphenous vein with ultrasound, directed 27-gauge needle into it and filled it with 1% polidocanol foam which also filled the tributaries on the medial calf.  We used 0.5 mL of 1% polidocanol foam.    I then injected 0.5% polidocanol foam into a few small cutaneous varicosities on the right and left posterior medial calves after donning the Syris headlight.  We had the patient perform ankle pumps, cleaned her legs and had her  don compression.  We had a walk for 10 minutes and we will have her return in approximate 6 weeks in follow-up.    Next session 30 minutes, ultrasound-guided.  Sclerotherapy    Date/Time: 4/13/2022 9:59 AM  Performed by: Kaiden Tyler MD  Authorized by: Kaiden Tyler MD     Time out: Immediately prior to the procedure a time out was called    Type:  Medically Necessary  Vein:  One Vein  Yes    Procedure side:  Left  Solution/Amount:  1% POLIDOCANOL  Syringes:  1/2   Patient tolerance:  Patient tolerated the procedure well with no immediate complications  Sclerotherapy    Date/Time: 4/13/2022 9:59 AM  Performed by: Kaiden Tyler MD  Authorized by: Kaiden Tyler MD     Type:  Medically Necessary  Vein:  One Vein  Yes    Procedure side:  Right  Solution/Amount:  1% POLIDOCANOL  Syringes:  1/2  Patient tolerance:  Patient tolerated the procedure well with no immediate complications  Wrap/Hose:  Hose        KAEL Tyler MD    Dictated using Dragon voice recognition software which may result in transcription errors      Again, thank you for allowing me to participate in the care of your patient.        Sincerely,        Kaiden Tyler MD

## 2022-04-13 NOTE — PROGRESS NOTES
Vein Clinic Sclerotherapy Note     Indications:  Recurrent, symptomatic, bilateral lower extremity varicose veins; symptoms recalcitrant to conservative measures     Procedure:  1.  Ultrasound-guided, medically necessary sclerotherapy symptomatic, recurrent left posterior medial calf varicose veins  2.  Ultrasound-guided, medically necessary sclerotherapy symptomatic, recurrent right medial calf varicose veins     Procedure description  Details of procedure including risks of allergic reaction, deep vein thrombosis, ulceration, superficial thrombophlebitis and hyperpigmentation were discussed.  The patient voiced understanding and wished to proceed.  Informed consent was obtained.    I imaged the left posterior medial calf with ultrasound and noted a cluster of varicosities which coursed from a muscular .  I isolated this cluster approximately 45 cm from where it communicated with the prior ultrasound, directed 27-gauge needle into the veins and injecting them with 0.5 mL of 1% polidocanol foam filling them well.  I had the patient perform ankle pumps.    I then imaged the right medial calf and noted small varicosities in this location which coursing from a segment of the below-knee great saphenous vein.  I isolated the below-knee great saphenous vein with ultrasound, directed 27-gauge needle into it and filled it with 1% polidocanol foam which also filled the tributaries on the medial calf.  We used 0.5 mL of 1% polidocanol foam.    I then injected 0.5% polidocanol foam into a few small cutaneous varicosities on the right and left posterior medial calves after donning the Syris headlight.  We had the patient perform ankle pumps, cleaned her legs and had her don compression.  We had a walk for 10 minutes and we will have her return in approximate 6 weeks in follow-up.    Next session 30 minutes, ultrasound-guided.  Sclerotherapy    Date/Time: 4/13/2022 9:59 AM  Performed by: Kaiden Tyler,  MD  Authorized by: Kaiden Tyler MD     Time out: Immediately prior to the procedure a time out was called    Type:  Medically Necessary  Vein:  One Vein  Yes    Procedure side:  Left  Solution/Amount:  1% POLIDOCANOL  Syringes:  1/2   Patient tolerance:  Patient tolerated the procedure well with no immediate complications  Sclerotherapy    Date/Time: 4/13/2022 9:59 AM  Performed by: Kaiden Tyler MD  Authorized by: Kaiden Tyler MD     Type:  Medically Necessary  Vein:  One Vein  Yes    Procedure side:  Right  Solution/Amount:  1% POLIDOCANOL  Syringes:  1/2  Patient tolerance:  Patient tolerated the procedure well with no immediate complications  Wrap/Hose:  Maulik Tyler MD    Dictated using Dragon voice recognition software which may result in transcription errors

## 2022-04-13 NOTE — PATIENT INSTRUCTIONS
SCLEROTHERAPY AFTER CARE  Immediately:  After treatment, walk for 10-15 minutes before getting in your car.  If your trip home is more than 1 hour, stop and walk around for 5-10 minutes. Avoid sitting or standing for extended periods.   First 24 hours: Wear your compression continuously, even while in bed. After the 24 hours, you may shower if you want to. Put your hose back on, unless you are going to bed. You should NOT wear compression to bed after the first 24 hours. You may fly the next day, but wear your compression.   For 5 days: Wear the compression hose for waking hours only. You may continue to wear them longer than 5 days if you prefer.   For days 5-7: Walking is encouraged, as it promotes efficient circulation in your veins. You may do activities that raise your heart rate, but do NOT run, jog, do high impact aerobics, or weight lifting. After 7 days, no activity restrictions.    Shaving: Wait a few days to shave or apply lotion.   Bathing: Do NOT take hot baths or sit in a hot tub for 7-10 days.    For 1 year: Wear SPF 30 sunscreen on your legs when in the sun. This is very important! It helps prevent darkening of the skin at the injection sites.   Medications: You may resume your usual medications, including aspirin or ibuprofen.    Common Things to Expect  -  Compression must be worn for the first 24 hours and then during the day for 5-7 days.    -  If larger veins are treated with ultrasound-guided sclerotherapy, you will have redness, firmness, tenderness, and swelling.  This firmness and tenderness may take 3-6 months to resolve. Ibuprofen and compression hose will aid in this process.    -  You will have bruising that can last up to 3 weeks. Most fading of the veins will occur between 3 and 6 weeks after treatment.    -  You may notice brown discoloration (hyperpigmentation) at the treatment site.  This should fade with time, but will take 3 months to 1 year to fully heal.     -  Some treated  veins may look darker because of trapped blood within the vein. This trapped blood can be removed at a minimum of 1 month following treatment. Larger veins are more likely to develop trapped blood.    -  It is very important for you to use at least SPF 30 sunscreen in order to help prevent the discoloration of your skin.    -  Migraines rarely occur following sclerotherapy, but are more likely in patients with a history of migraines.  Treat as you would any other migraine.

## 2022-04-19 RX ORDER — HYDROCORTISONE 25 MG/G
CREAM TOPICAL
Qty: 60 G | Refills: 1 | Status: SHIPPED | OUTPATIENT
Start: 2022-04-19 | End: 2024-03-25

## 2022-05-27 ENCOUNTER — OFFICE VISIT (OUTPATIENT)
Dept: VASCULAR SURGERY | Facility: CLINIC | Age: 47
End: 2022-05-27
Payer: MEDICARE

## 2022-05-27 DIAGNOSIS — I83.813 VARICOSE VEINS OF BOTH LOWER EXTREMITIES WITH PAIN: Primary | ICD-10-CM

## 2022-05-27 PROCEDURE — 99213 OFFICE O/P EST LOW 20 MIN: CPT | Performed by: SURGERY

## 2022-05-27 PROCEDURE — T1013 SIGN LANG/ORAL INTERPRETER: HCPCS | Mod: U3

## 2022-05-27 NOTE — PROGRESS NOTES
[facility] [place] medically necessary sclerotherapy follow-up  Sammie Ramirez returns medically necessary, bilateral ultrasound-guided sclerotherapy on 4/13/2022.  She states that about 4 5 days after the treatment, she developed some tenderness and erythema as well as firmness of the left mid medial calf.  This has slowly improved of.    Exam  Left lower extremity: Induration and limited ecchymosis over a 1.5 inch long by 6 mm wide area on the mid medial left calf.  This is consistent with a thrombosed vein.  There are some reticular veins on the posterior left calf but no varicose veins.    On the right lower extremity she has some reticular veins/small varicosities on the posterior medial right calf.  No varicose veins are present.    I imaged the area of the left medial calf and note several thrombosed varicosities.  There is a thrombosed varicosity deep to the area of the mid medial left calf.  This is clearly a thrombosed vein.    I cleaned the area with alcohol, made a stab wound with an 18-gauge needle and then expressed thrombus.    Compression  Good result following ultrasound-guided, medically necessary sclerotherapy.  She is currently having no pain and she will return on an as-needed basis.    KAEL Tyler MD    Dictated using Dragon voice recognition software which may result in transcription errors        VEIN CLINIC LEG DRAWING:              
quit smoking 50 years ago/cigarettes

## 2022-05-27 NOTE — LETTER
5/27/2022         RE: Sammie Ramirez  [address] [address]        Dear Colleague,    Thank you for referring your patient, Sammie Ramirez, to the [facility] [place]. Please see a copy of my visit note below.    [facility] [place] medically necessary sclerotherapy follow-up  Sammie Ramirez returns medically necessary, bilateral ultrasound-guided sclerotherapy on 4/13/2022.  She states that about 4 5 days after the treatment, she developed some tenderness and erythema as well as firmness of the left mid medial calf.  This has slowly improved of.    Exam  Left lower extremity: Induration and limited ecchymosis over a 1.5 inch long by 6 mm wide area on the mid medial left calf.  This is consistent with a thrombosed vein.  There are some reticular veins on the posterior left calf but no varicose veins.    On the right lower extremity she has some reticular veins/small varicosities on the posterior medial right calf.  No varicose veins are present.    I imaged the area of the left medial calf and note several thrombosed varicosities.  There is a thrombosed varicosity deep to the area of the mid medial left calf.  This is clearly a thrombosed vein.    I cleaned the area with alcohol, made a stab wound with an 18-gauge needle and then expressed thrombus.    Compression  Good result following ultrasound-guided, medically necessary sclerotherapy.  She is currently having no pain and she will return on an as-needed basis.    KAEL Tyler MD    Dictated using Dragon voice recognition software which may result in transcription errors        VEIN CLINIC LEG DRAWING:                  Again, thank you for allowing me to participate in the care of your patient.        Sincerely,        Kaiden Tyler MD

## 2022-06-09 ENCOUNTER — OFFICE VISIT (OUTPATIENT)
Dept: OBGYN | Facility: CLINIC | Age: 47
End: 2022-06-09
Payer: MEDICARE

## 2022-06-09 VITALS
WEIGHT: 136.1 LBS | BODY MASS INDEX: 23.23 KG/M2 | HEIGHT: 64 IN | SYSTOLIC BLOOD PRESSURE: 118 MMHG | DIASTOLIC BLOOD PRESSURE: 70 MMHG

## 2022-06-09 DIAGNOSIS — N94.6 DYSMENORRHEA: ICD-10-CM

## 2022-06-09 DIAGNOSIS — Z98.890 S/P LEEP OF CERVIX: Primary | ICD-10-CM

## 2022-06-09 DIAGNOSIS — Z12.31 ENCOUNTER FOR SCREENING MAMMOGRAM FOR MALIGNANT NEOPLASM OF BREAST: ICD-10-CM

## 2022-06-09 DIAGNOSIS — Z29.9 PREVENTIVE MEASURE: ICD-10-CM

## 2022-06-09 PROCEDURE — 99213 OFFICE O/P EST LOW 20 MIN: CPT | Performed by: FAMILY MEDICINE

## 2022-06-09 RX ORDER — MELOXICAM 15 MG/1
15 TABLET ORAL DAILY
Qty: 30 TABLET | Refills: 3 | Status: SHIPPED | OUTPATIENT
Start: 2022-06-09 | End: 2023-07-26

## 2022-06-09 NOTE — NURSING NOTE
"Chief Complaint   Patient presents with     Gyn Exam     Consult     Left side pain--slowly increasing--history of cysts--possibly in menopause       Initial /70   Ht 1.626 m (5' 4\")   Wt 61.7 kg (136 lb 1.6 oz)   LMP 2022 (Exact Date)   BMI 23.36 kg/m   Estimated body mass index is 23.36 kg/m  as calculated from the following:    Height as of this encounter: 1.626 m (5' 4\").    Weight as of this encounter: 61.7 kg (136 lb 1.6 oz).  BP completed using cuff size: regular    Questioned patient about current smoking habits.  Pt. has never smoked.          The following HM Due: pap smear      "

## 2022-06-09 NOTE — PROGRESS NOTES
SUBJECTIVE:  Sammie Ramirez is an 46 year old   woman who presents for   gynecology consult for dysmenorrhea, not responding to ibuprofen.  Patient's last menstrual period was 2022 (exact date).     Periods are regular q 28-30 days, lasting   5-6 days. Menarche @ age teen, Dysmenorrhea:moderate, occurring premenstrually and first 1-2 days of flow. Cyclic symptoms   include none. No intermenstrual bleeding,   spotting, or discharge.    Current contraception: none  BINA exposure: no  History of abnormal Pap smear: Yes: hx of LEEP, now normal   Family history of uterine or ovarian cancer: No  Regular self breast exam: Yes  History of abnormal mammogram: No  Family history of breast cancer: Mother with stage 3 age 66, MGM   History of abnormal lipids: No    Past Medical History:   Diagnosis Date     ASCUS (atypical squamous cells of undetermined significance) on Pap smear 10/13/2016    Neg HPV     History of colposcopy 2018     LSIL (low grade squamous intraepithelial lesion) on Pap smear 2014          Family History   Problem Relation Age of Onset     Family History Negative Mother      Family History Negative Father        Past Surgical History:   Procedure Laterality Date     LEEP TX, CERVICAL  x 2 per chart    historical       Current Outpatient Medications   Medication     hydrocortisone, Perianal, (ANUSOL-HC) 2.5 % cream     ibuprofen (ADVIL/MOTRIN) 800 MG tablet     levocetirizine (XYZAL) 5 MG tablet     mometasone (NASONEX) 50 MCG/ACT nasal spray     montelukast (SINGULAIR) 10 MG tablet     omeprazole (PRILOSEC) 40 MG DR capsule     ondansetron (ZOFRAN) 8 MG tablet     polyethylene glycol (MIRALAX) 17 GM/Dose powder     rizatriptan (MAXALT) 10 MG tablet     SUMAtriptan (IMITREX) 100 MG tablet     triamcinolone (KENALOG) 0.1 % external cream     No current facility-administered medications for this visit.     Allergies   Allergen Reactions     Amoxicillin      Rash, hives      "Codeine Sulfate      Lactose      Seasonal Allergies        Social History     Tobacco Use     Smoking status: Never Smoker     Smokeless tobacco: Never Used   Substance Use Topics     Alcohol use: Yes     Comment: wine       Review Of Systems  Ears/Nose/Throat: negative  Respiratory: No shortness of breath, dyspnea on exertion, cough, or hemoptysis  Cardiovascular: negative  Gastrointestinal: negative  Genitourinary: See HPI   Constitutional, HEENT, cardiovascular, pulmonary, GI, , musculoskeletal, neuro, skin, endocrine and psych systems are negative, except as otherwise noted.    OBJECTIVE:  /70   Ht 1.626 m (5' 4\")   Wt 61.7 kg (136 lb 1.6 oz)   LMP 2022 (Exact Date)   BMI 23.36 kg/m    General appearance: healthy, alert and no distress  Skin: Skin color, texture, turgor normal. No rashes or lesions.  Ears: negative  Nose/Sinuses: Nares normal. Septum midline. Mucosa normal. No drainage or sinus tenderness.  Oropharynx: Lips, mucosa, and tongue normal. Teeth and gums normal.  Neck: Neck supple. No adenopathy. Thyroid symmetric, normal size,, Carotids without bruits.  Lungs: negative, Percussion normal. Good diaphragmatic excursion. Lungs clear  Heart: negative, PMI normal. No lifts, heaves, or thrills. RRR. No murmurs, clicks gallops or rub  Abdomen: Abdomen soft, non-tender. BS normal. No masses, organomegaly        ASSESSMENT:  Sammie Ramirez is an 46 year old   woman who presents for   gynecology consult for dysmenorrhea, not responding to ibuprofen, notes    This started a few months ago.     PLAN:  Dx:  1)  Dysmenorrhea:  Worries about a fibroid.  Recommend pelvic ultrasound.    Has had a previous cyst 10 years prior.    Discussed treatment with Oral Contraceptive/IUD, laparoscopic endometriosis resection/fulguration,   Or adding mobic   Will start with pelvic us and mobic and follow-up for results, will plan pap smear and breast exam at that visit.           Labs were reviewed in " Epic   Imaging was reviewed in Epic   Tests and documents were reviewed.   Discussion of management or test interpretation   Diagnosis or treatment significantly limited by social determinant    present         Dr. Krystin Sen,     Obstetrics and Gynecology  [facility] and [facility]

## 2022-07-19 ENCOUNTER — ANCILLARY PROCEDURE (OUTPATIENT)
Dept: ULTRASOUND IMAGING | Facility: CLINIC | Age: 47
End: 2022-07-19
Attending: FAMILY MEDICINE
Payer: MEDICARE

## 2022-07-19 DIAGNOSIS — N94.6 DYSMENORRHEA: ICD-10-CM

## 2022-07-19 PROCEDURE — 76830 TRANSVAGINAL US NON-OB: CPT | Performed by: OBSTETRICS & GYNECOLOGY

## 2022-07-19 PROCEDURE — 76856 US EXAM PELVIC COMPLETE: CPT | Performed by: OBSTETRICS & GYNECOLOGY

## 2022-07-20 ENCOUNTER — TELEPHONE (OUTPATIENT)
Dept: FAMILY MEDICINE | Facility: CLINIC | Age: 47
End: 2022-07-20

## 2022-07-20 DIAGNOSIS — N83.209 CYST OF OVARY, UNSPECIFIED LATERALITY: Primary | ICD-10-CM

## 2022-07-21 NOTE — TELEPHONE ENCOUNTER
Ok sure we can repeat the ultrasound in 8 weeks   The office will call to set up   Please advise  Dr. Krystin Sen, DO    Obstetrics and Gynecology  [facility] - [place] and [place]

## 2022-08-11 ENCOUNTER — HOSPITAL ENCOUNTER (OUTPATIENT)
Dept: MAMMOGRAPHY | Facility: CLINIC | Age: 47
Discharge: HOME | End: 2022-08-11
Attending: FAMILY MEDICINE | Admitting: FAMILY MEDICINE
Payer: MEDICARE

## 2022-08-11 DIAGNOSIS — Z12.31 ENCOUNTER FOR SCREENING MAMMOGRAM FOR MALIGNANT NEOPLASM OF BREAST: ICD-10-CM

## 2022-08-11 DIAGNOSIS — Z29.9 PREVENTIVE MEASURE: ICD-10-CM

## 2022-08-11 PROCEDURE — 77067 SCR MAMMO BI INCL CAD: CPT

## 2022-08-15 ENCOUNTER — TELEPHONE (OUTPATIENT)
Dept: OBGYN | Facility: CLINIC | Age: 47
End: 2022-08-15

## 2022-08-15 ENCOUNTER — OFFICE VISIT (OUTPATIENT)
Dept: OBGYN | Facility: CLINIC | Age: 47
End: 2022-08-15
Payer: MEDICARE

## 2022-08-15 VITALS
HEIGHT: 64 IN | BODY MASS INDEX: 22.45 KG/M2 | SYSTOLIC BLOOD PRESSURE: 132 MMHG | WEIGHT: 131.5 LBS | DIASTOLIC BLOOD PRESSURE: 70 MMHG

## 2022-08-15 DIAGNOSIS — R10.2 PELVIC PAIN IN FEMALE: ICD-10-CM

## 2022-08-15 DIAGNOSIS — N83.201 RIGHT OVARIAN CYST: ICD-10-CM

## 2022-08-15 DIAGNOSIS — Z01.411 ENCOUNTER FOR GYNECOLOGICAL EXAMINATION (GENERAL) (ROUTINE) WITH ABNORMAL FINDINGS: Primary | ICD-10-CM

## 2022-08-15 DIAGNOSIS — R53.83 FATIGUE, UNSPECIFIED TYPE: ICD-10-CM

## 2022-08-15 DIAGNOSIS — Z13.9 SCREENING FOR CONDITION: ICD-10-CM

## 2022-08-15 DIAGNOSIS — E78.5 DYSLIPIDEMIA: ICD-10-CM

## 2022-08-15 LAB
ALBUMIN SERPL-MCNC: 3.5 G/DL (ref 3.4–5)
ALP SERPL-CCNC: 48 U/L (ref 40–150)
ALT SERPL W P-5'-P-CCNC: 21 U/L (ref 0–50)
ANION GAP SERPL CALCULATED.3IONS-SCNC: 8 MMOL/L (ref 3–14)
AST SERPL W P-5'-P-CCNC: 19 U/L (ref 0–45)
BILIRUB SERPL-MCNC: 0.6 MG/DL (ref 0.2–1.3)
BUN SERPL-MCNC: 10 MG/DL (ref 7–30)
CALCIUM SERPL-MCNC: 9.3 MG/DL (ref 8.5–10.1)
CHLORIDE BLD-SCNC: 103 MMOL/L (ref 94–109)
CHOLEST SERPL-MCNC: 225 MG/DL
CLUE CELLS: NORMAL
CO2 SERPL-SCNC: 23 MMOL/L (ref 20–32)
CREAT SERPL-MCNC: 0.75 MG/DL (ref 0.52–1.04)
ERYTHROCYTE [DISTWIDTH] IN BLOOD BY AUTOMATED COUNT: 13.6 % (ref 10–15)
FASTING STATUS PATIENT QL REPORTED: YES
GFR SERPL CREATININE-BSD FRML MDRD: >90 ML/MIN/1.73M2
GLUCOSE BLD-MCNC: 94 MG/DL (ref 70–99)
HCT VFR BLD AUTO: 44.8 % (ref 35–47)
HDLC SERPL-MCNC: 95 MG/DL
HGB BLD-MCNC: 14.9 G/DL (ref 11.7–15.7)
LDLC SERPL CALC-MCNC: 114 MG/DL
MCH RBC QN AUTO: 29.7 PG (ref 26.5–33)
MCHC RBC AUTO-ENTMCNC: 33.3 G/DL (ref 31.5–36.5)
MCV RBC AUTO: 89 FL (ref 78–100)
NONHDLC SERPL-MCNC: 130 MG/DL
PLATELET # BLD AUTO: 217 10E3/UL (ref 150–450)
POTASSIUM BLD-SCNC: 3.7 MMOL/L (ref 3.4–5.3)
PROT SERPL-MCNC: 7.2 G/DL (ref 6.8–8.8)
RBC # BLD AUTO: 5.02 10E6/UL (ref 3.8–5.2)
SODIUM SERPL-SCNC: 134 MMOL/L (ref 133–144)
TRICHOMONAS, WET PREP: NORMAL
TRIGL SERPL-MCNC: 78 MG/DL
TSH SERPL DL<=0.005 MIU/L-ACNC: 0.68 MU/L (ref 0.4–4)
WBC # BLD AUTO: 4.2 10E3/UL (ref 4–11)
WBC'S/HIGH POWER FIELD, WET PREP: NORMAL
YEAST, WET PREP: NORMAL

## 2022-08-15 PROCEDURE — 87624 HPV HI-RISK TYP POOLED RSLT: CPT | Performed by: FAMILY MEDICINE

## 2022-08-15 PROCEDURE — 84443 ASSAY THYROID STIM HORMONE: CPT | Performed by: FAMILY MEDICINE

## 2022-08-15 PROCEDURE — 85027 COMPLETE CBC AUTOMATED: CPT | Performed by: FAMILY MEDICINE

## 2022-08-15 PROCEDURE — 87491 CHLMYD TRACH DNA AMP PROBE: CPT | Performed by: FAMILY MEDICINE

## 2022-08-15 PROCEDURE — 88141 CYTOPATH C/V INTERPRET: CPT | Performed by: PATHOLOGY

## 2022-08-15 PROCEDURE — 87210 SMEAR WET MOUNT SALINE/INK: CPT | Performed by: FAMILY MEDICINE

## 2022-08-15 PROCEDURE — 80061 LIPID PANEL: CPT | Performed by: FAMILY MEDICINE

## 2022-08-15 PROCEDURE — 36415 COLL VENOUS BLD VENIPUNCTURE: CPT | Performed by: FAMILY MEDICINE

## 2022-08-15 PROCEDURE — 80053 COMPREHEN METABOLIC PANEL: CPT | Performed by: FAMILY MEDICINE

## 2022-08-15 PROCEDURE — 99396 PREV VISIT EST AGE 40-64: CPT | Performed by: FAMILY MEDICINE

## 2022-08-15 PROCEDURE — 87591 N.GONORRHOEAE DNA AMP PROB: CPT | Performed by: FAMILY MEDICINE

## 2022-08-15 PROCEDURE — 99213 OFFICE O/P EST LOW 20 MIN: CPT | Mod: 25 | Performed by: FAMILY MEDICINE

## 2022-08-15 PROCEDURE — 88175 CYTOPATH C/V AUTO FLUID REDO: CPT | Performed by: FAMILY MEDICINE

## 2022-08-15 NOTE — PATIENT INSTRUCTIONS
Rossana will call to set up the surgery     Labs today     Dr. Krystin Sen,     Obstetrics and Gynecology  [facility] - [place] and [place]

## 2022-08-15 NOTE — NURSING NOTE
"Chief Complaint   Patient presents with     Gyn Exam     Pap and breast exam     Results     Pelvic US in July       Initial /70   Ht 1.626 m (5' 4\")   Wt 59.6 kg (131 lb 8 oz)   LMP 2022 (Exact Date)   BMI 22.57 kg/m   Estimated body mass index is 22.57 kg/m  as calculated from the following:    Height as of this encounter: 1.626 m (5' 4\").    Weight as of this encounter: 59.6 kg (131 lb 8 oz).  BP completed using cuff size: regular    Questioned patient about current smoking habits.  Pt. has never smoked.          The following HM Due: pap smear    Lynsey Starks CMA      "

## 2022-08-15 NOTE — LETTER
August 17, 2022      Sammie Ramirez  [address] [address]        Dear ,    We are writing to inform you of your test results.    Test results indicate you may require additional follow up, see comment below.    Resulted Orders   CBC with platelets   Result Value Ref Range    WBC Count 4.2 4.0 - 11.0 10e3/uL    RBC Count 5.02 3.80 - 5.20 10e6/uL    Hemoglobin 14.9 11.7 - 15.7 g/dL    Hematocrit 44.8 35.0 - 47.0 %    MCV 89 78 - 100 fL    MCH 29.7 26.5 - 33.0 pg    MCHC 33.3 31.5 - 36.5 g/dL    RDW 13.6 10.0 - 15.0 %    Platelet Count 217 150 - 450 10e3/uL   Comprehensive metabolic panel   Result Value Ref Range    Sodium 134 133 - 144 mmol/L    Potassium 3.7 3.4 - 5.3 mmol/L    Chloride 103 94 - 109 mmol/L    Carbon Dioxide (CO2) 23 20 - 32 mmol/L    Anion Gap 8 3 - 14 mmol/L    Urea Nitrogen 10 7 - 30 mg/dL    Creatinine 0.75 0.52 - 1.04 mg/dL    Calcium 9.3 8.5 - 10.1 mg/dL    Glucose 94 70 - 99 mg/dL    Alkaline Phosphatase 48 40 - 150 U/L    AST 19 0 - 45 U/L    ALT 21 0 - 50 U/L    Protein Total 7.2 6.8 - 8.8 g/dL    Albumin 3.5 3.4 - 5.0 g/dL    Bilirubin Total 0.6 0.2 - 1.3 mg/dL    GFR Estimate >90 >60 mL/min/1.73m2      Comment:      Effective December 21, 2021 eGFRcr in adults is calculated using the 2021 CKD-EPI creatinine equation which includes age and gender (Ramo harvey al., NEJM, DOI: 10.1056/FESCag0441551)   Lipid panel reflex to direct LDL Fasting   Result Value Ref Range    Cholesterol 225 (H) <200 mg/dL    Triglycerides 78 <150 mg/dL    Direct Measure HDL 95 >=50 mg/dL    LDL Cholesterol Calculated 114 (H) <=100 mg/dL    Non HDL Cholesterol 130 (H) <130 mg/dL    Patient Fasting > 8hrs? Yes     Narrative    Cholesterol  Desirable:  <200 mg/dL    Triglycerides  Normal:  Less than 150 mg/dL  Borderline High:  150-199 mg/dL  High:  200-499 mg/dL  Very High:  Greater than or equal to 500 mg/dL    Direct Measure HDL  Female:  Greater than or equal to 50 mg/dL   Male:  Greater than  or equal to 40 mg/dL    LDL Cholesterol  Desirable:  <100mg/dL  Above Desirable:  100-129 mg/dL   Borderline High:  130-159 mg/dL   High:  160-189 mg/dL   Very High:  >= 190 mg/dL    Non HDL Cholesterol  Desirable:  130 mg/dL  Above Desirable:  130-159 mg/dL  Borderline High:  160-189 mg/dL  High:  190-219 mg/dL  Very High:  Greater than or equal to 220 mg/dL   TSH with free T4 reflex   Result Value Ref Range    TSH 0.68 0.40 - 4.00 mU/L   Wet prep - Clinic Collect   Result Value Ref Range    Trichomonas Absent Absent    Yeast Absent Absent    Clue Cells Absent Absent    WBCs/high power field None None   NEISSERIA GONORRHOEA PCR   Result Value Ref Range    Neisseria gonorrhoeae Negative Negative      Comment:      Negative for N. gonorrhoeae rRNA by transcription mediated amplification. A negative result by transcription mediated amplification does not preclude the presence of C. trachomatis infection because results are dependent on proper and adequate collection, absence of inhibitors and sufficient rRNA to be detected.   CHLAMYDIA TRACHOMATIS PCR   Result Value Ref Range    Chlamydia trachomatis Negative Negative      Comment:      A negative result by transcription mediated amplification does not preclude the presence of C. trachomatis infection because results are dependent on proper and adequate collection, absence of inhibitors and sufficient rRNA to be detected.       If you have any questions or concerns, please call the clinic at the number listed above.       Sincerely,      Krystin Sen, DO

## 2022-08-15 NOTE — TELEPHONE ENCOUNTER
Patient Name: Sammie Ramirez   MRN: [id number]   Case#: [id number]   Surgeon(s) and Role:      * Krystin Sen,  - Primary   Date requested: 9/26/2022   Location: [facility] OR   Procedure(s):   PROCEDURE, PELVIS, ROBOT-ASSISTED Robotic assisted laparoscopic right ovarian cystectomy, endometriosis resection/fulguration, and endometrial biopsy   (Right)

## 2022-08-15 NOTE — PROGRESS NOTES
SUBJECTIVE:  Sammie Ramirez is an 46 year old  woman who presents for   annual gyn exam. Patient's last menstrual period was 2022 (exact date). Periods are regular q 28-30 days, lasting   5-6 days. Menarche @ age teen, Dysmenorrhea:moderate, occurring premenstrually and first 1-2 days of flow. Cyclic symptoms   include none. No intermenstrual bleeding,   spotting, or discharge.    Current contraception: none, periodic abstinence, same partner for 4 years.   BINA exposure: no  History of abnormal Pap smear: Yes: hx of LEEP, now normal   Family history of uterine or ovarian cancer: No  Regular self breast exam: Yes  History of abnormal mammogram: No  Family history of breast cancer: Mother with stage 3 age 66, MGM   History of abnormal lipids: No    Past Medical History:   Diagnosis Date     ASCUS (atypical squamous cells of undetermined significance) on Pap smear 10/13/2016    Neg HPV     History of colposcopy 2018     LSIL (low grade squamous intraepithelial lesion) on Pap smear 2014        Family History   Problem Relation Age of Onset     Breast Cancer Mother      Family History Negative Mother      Family History Negative Father      Breast Cancer Maternal Grandmother        Past Surgical History:   Procedure Laterality Date     LEEP TX, CERVICAL  x 2 per chart    historical       Current Outpatient Medications   Medication     hydrocortisone, Perianal, (ANUSOL-HC) 2.5 % cream     ibuprofen (ADVIL/MOTRIN) 800 MG tablet     levocetirizine (XYZAL) 5 MG tablet     meloxicam (MOBIC) 15 MG tablet     mometasone (NASONEX) 50 MCG/ACT nasal spray     montelukast (SINGULAIR) 10 MG tablet     omeprazole (PRILOSEC) 40 MG DR capsule     ondansetron (ZOFRAN) 8 MG tablet     polyethylene glycol (MIRALAX) 17 GM/Dose powder     rizatriptan (MAXALT) 10 MG tablet     SUMAtriptan (IMITREX) 100 MG tablet     triamcinolone (KENALOG) 0.1 % external cream     No current facility-administered  "medications for this visit.     Allergies   Allergen Reactions     Amoxicillin      Rash, hives     Codeine Sulfate      Lactose      Seasonal Allergies        Social History     Tobacco Use     Smoking status: Never Smoker     Smokeless tobacco: Never Used   Substance Use Topics     Alcohol use: Yes     Comment: wine       Review Of Systems  Ears/Nose/Throat: negative  Respiratory: No shortness of breath, dyspnea on exertion, cough, or hemoptysis  Cardiovascular: negative  Gastrointestinal: negative  Genitourinary: See HPI   Constitutional, HEENT, cardiovascular, pulmonary, GI, , musculoskeletal, neuro, skin, endocrine and psych systems are negative, except as otherwise noted.      OBJECTIVE:  /70   Ht 1.626 m (5' 4\")   Wt 59.6 kg (131 lb 8 oz)   LMP 07/14/2022 (Exact Date)   BMI 22.57 kg/m      General appearance: healthy, alert and no distress  Skin: Skin color, texture, turgor normal. No rashes or lesions.  Ears: negative  Nose/Sinuses: Nares normal. Septum midline. Mucosa normal. No drainage or sinus tenderness.  Oropharynx: Lips, mucosa, and tongue normal. Teeth and gums normal.  Neck: Neck supple. No adenopathy. Thyroid symmetric, normal size,, Carotids without bruits.  Lungs: negative, Percussion normal. Good diaphragmatic excursion. Lungs clear  Heart: negative, PMI normal. No lifts, heaves, or thrills. RRR. No murmurs, clicks gallops or rub  Breasts: Inspection negative. No nipple discharge or bleeding. No masses.  Abdomen: Abdomen soft, non-tender. BS normal. No masses, organomegaly  Pelvic: Pelvic:  Pelvic examination with pap/ Gonorrhea and Chlamydia   including  External genitalia normal   and vagina normal rugatted not atrophic  Examination of urethra  normal no masses, tenderness, scarring  bladder, no masses or tenderness  Cervix  no lesions or discharge  Bimanual exam with   Uterus 8 weeks size, mid position, no descent   Adnexa/parametria  Normal no           ASSESSMENT:  Sammie Ramirez " is an 46 year old  woman who presents for   annual gyn exam. Concerns:  See below.      PLAN:  Dx:  1)  Pap smear  Today   Gonorrhea  Chlamydia today   2)  Mammography done, lipids at appropriate intervals with history of dyslipidemia    Colonoscopy not due  3)  Covid-19 concerns: covid infection and vaccination recommendations discussed.   4)  Contraception: none   5) Dysmenorrhea, now increasing pelvic pain, bloating bothers her:  Symptoms resolve after the period usually,   But then bloating occurring for last month.     Treated with mobic and pelvic us on 2022  Showing simple right ovarian cyst on 2022  On last visit Discussed treatment with Oral Contraceptive/IUD, laparoscopic endometriosis resection/fulguration  And hysterectomy.      She desires cyst removal and evaluation for endometriosis   Declines medical treatment and desires definitive surgical management  Will be scheduled.  Plan Robotic assisted laparoscopic right ovarian cystectomy and endometriosis resection/fulguration    6)  Fatigue: check thyroid  7) Dyslipidemia: check lipids      PE:  Reviewed health maintenance including diet, regular exercise   and periodic exams.    Dr. Krystin Sen, DO    Obstetrics and Gynecology  [facility] - [place] [address] and [place]

## 2022-08-16 LAB
C TRACH DNA SPEC QL NAA+PROBE: NEGATIVE
N GONORRHOEA DNA SPEC QL NAA+PROBE: NEGATIVE

## 2022-08-17 NOTE — TELEPHONE ENCOUNTER
Type of surgery: robotic assisted laparoscopic right ovarian cystectomy, endometriosis resection/fulguration, endometrial biopsy  Location of surgery: [facility]  Date and time of surgery: 9/28/22 @ 9:40 am  Surgeon: Dr. Sen  Pre-Op Appt Date: Patient advised to schedule.  Post-Op Appt Date: Patient advised to schedule.   Packet sent out: Yes  Pre-cert/Authorization completed:  No  Date: 8/17/22

## 2022-08-18 ENCOUNTER — TELEPHONE (OUTPATIENT)
Dept: OBGYN | Facility: CLINIC | Age: 47
End: 2022-08-18

## 2022-08-18 NOTE — TELEPHONE ENCOUNTER
Reason for Call:  Same Day Appointment, Requested Provider:  Dr. Sen    PCP: Lakeisha Albert    Reason for visit: Post -Op F/U    Duration of symptoms: 09/28/22    Have you been treated for this in the past? Yes    Additional comments: Patient was told to schedule a F/u with OB provider 1 - 2 weeks after surgery  And Dr. Sen has no openings till early October    Can we leave a detailed message on this number? YES    Phone number patient can be reached at: Home number on file [phone] (home)    Best Time: anytime    Call taken on 8/18/2022 at 11:19 AM by Sammie Sheridan

## 2022-08-19 LAB
BKR LAB AP GYN ADEQUACY: ABNORMAL
BKR LAB AP GYN INTERPRETATION: ABNORMAL
BKR LAB AP HPV REFLEX: ABNORMAL
BKR LAB AP LMP: ABNORMAL
BKR LAB AP PREVIOUS ABNL DX: ABNORMAL
BKR LAB AP PREVIOUS ABNORMAL: ABNORMAL
PATH REPORT.COMMENTS IMP SPEC: ABNORMAL
PATH REPORT.COMMENTS IMP SPEC: ABNORMAL
PATH REPORT.RELEVANT HX SPEC: ABNORMAL

## 2022-08-22 DIAGNOSIS — L30.9 DERMATITIS: ICD-10-CM

## 2022-08-22 LAB
HUMAN PAPILLOMA VIRUS 16 DNA: NEGATIVE
HUMAN PAPILLOMA VIRUS 18 DNA: NEGATIVE
HUMAN PAPILLOMA VIRUS FINAL DIAGNOSIS: NORMAL
HUMAN PAPILLOMA VIRUS OTHER HR: NEGATIVE

## 2022-08-22 RX ORDER — TRIAMCINOLONE ACETONIDE 1 MG/G
CREAM TOPICAL
Qty: 30 G | Refills: 0 | Status: SHIPPED | OUTPATIENT
Start: 2022-08-22 | End: 2024-03-27

## 2022-08-26 ENCOUNTER — PATIENT OUTREACH (OUTPATIENT)
Dept: OBGYN | Facility: CLINIC | Age: 47
End: 2022-08-26

## 2022-08-26 DIAGNOSIS — Z98.890 S/P LEEP OF CERVIX: ICD-10-CM

## 2022-08-31 ENCOUNTER — PREP FOR PROCEDURE (OUTPATIENT)
Dept: OBGYN | Facility: CLINIC | Age: 47
End: 2022-08-31

## 2022-08-31 NOTE — TELEPHONE ENCOUNTER
Spoke with Kimberly at [facility] Scheduling to add cervical biopsy to the procedure scheduled on 9/28/22.  Case modification generated.

## 2022-08-31 NOTE — TELEPHONE ENCOUNTER
Krsytin Sen DO [facility] Triage  Cc: Rossana Laurent  Yes we can add that.  Please let patient know.     Rossana, can you add cervical biopsy to the patient's procedure?

## 2022-09-19 ENCOUNTER — OFFICE VISIT (OUTPATIENT)
Dept: FAMILY MEDICINE | Facility: CLINIC | Age: 47
End: 2022-09-19
Payer: MEDICARE

## 2022-09-19 VITALS
BODY MASS INDEX: 22.45 KG/M2 | DIASTOLIC BLOOD PRESSURE: 82 MMHG | OXYGEN SATURATION: 100 % | SYSTOLIC BLOOD PRESSURE: 130 MMHG | HEART RATE: 67 BPM | TEMPERATURE: 98.2 F | RESPIRATION RATE: 16 BRPM | HEIGHT: 64 IN | WEIGHT: 131.5 LBS

## 2022-09-19 DIAGNOSIS — Z01.818 PRE-OPERATIVE GENERAL PHYSICAL EXAMINATION: Primary | ICD-10-CM

## 2022-09-19 DIAGNOSIS — N83.201 RIGHT OVARIAN CYST: ICD-10-CM

## 2022-09-19 DIAGNOSIS — R10.2 PELVIC PAIN IN FEMALE: ICD-10-CM

## 2022-09-19 PROCEDURE — 99214 OFFICE O/P EST MOD 30 MIN: CPT | Performed by: FAMILY MEDICINE

## 2022-09-19 SDOH — ECONOMIC STABILITY: FOOD INSECURITY: WITHIN THE PAST 12 MONTHS, YOU WORRIED THAT YOUR FOOD WOULD RUN OUT BEFORE YOU GOT MONEY TO BUY MORE.: NEVER TRUE

## 2022-09-19 SDOH — HEALTH STABILITY: PHYSICAL HEALTH: ON AVERAGE, HOW MANY MINUTES DO YOU ENGAGE IN EXERCISE AT THIS LEVEL?: 30 MIN

## 2022-09-19 SDOH — ECONOMIC STABILITY: TRANSPORTATION INSECURITY
IN THE PAST 12 MONTHS, HAS LACK OF TRANSPORTATION KEPT YOU FROM MEETINGS, WORK, OR FROM GETTING THINGS NEEDED FOR DAILY LIVING?: NO

## 2022-09-19 SDOH — ECONOMIC STABILITY: FOOD INSECURITY: WITHIN THE PAST 12 MONTHS, THE FOOD YOU BOUGHT JUST DIDN'T LAST AND YOU DIDN'T HAVE MONEY TO GET MORE.: NEVER TRUE

## 2022-09-19 SDOH — HEALTH STABILITY: PHYSICAL HEALTH: ON AVERAGE, HOW MANY DAYS PER WEEK DO YOU ENGAGE IN MODERATE TO STRENUOUS EXERCISE (LIKE A BRISK WALK)?: 7 DAYS

## 2022-09-19 SDOH — ECONOMIC STABILITY: TRANSPORTATION INSECURITY
IN THE PAST 12 MONTHS, HAS THE LACK OF TRANSPORTATION KEPT YOU FROM MEDICAL APPOINTMENTS OR FROM GETTING MEDICATIONS?: NO

## 2022-09-19 SDOH — ECONOMIC STABILITY: INCOME INSECURITY: HOW HARD IS IT FOR YOU TO PAY FOR THE VERY BASICS LIKE FOOD, HOUSING, MEDICAL CARE, AND HEATING?: NOT VERY HARD

## 2022-09-19 SDOH — ECONOMIC STABILITY: INCOME INSECURITY: IN THE LAST 12 MONTHS, WAS THERE A TIME WHEN YOU WERE NOT ABLE TO PAY THE MORTGAGE OR RENT ON TIME?: NO

## 2022-09-19 ASSESSMENT — LIFESTYLE VARIABLES
AUDIT-C TOTAL SCORE: 2
HOW OFTEN DO YOU HAVE SIX OR MORE DRINKS ON ONE OCCASION: LESS THAN MONTHLY
HOW OFTEN DO YOU HAVE A DRINK CONTAINING ALCOHOL: MONTHLY OR LESS
HOW MANY STANDARD DRINKS CONTAINING ALCOHOL DO YOU HAVE ON A TYPICAL DAY: 1 OR 2
SKIP TO QUESTIONS 9-10: 0

## 2022-09-19 ASSESSMENT — SOCIAL DETERMINANTS OF HEALTH (SDOH)
HOW OFTEN DO YOU ATTEND CHURCH OR RELIGIOUS SERVICES?: MORE THAN 4 TIMES PER YEAR
HOW OFTEN DO YOU GET TOGETHER WITH FRIENDS OR RELATIVES?: ONCE A WEEK
DO YOU BELONG TO ANY CLUBS OR ORGANIZATIONS SUCH AS CHURCH GROUPS UNIONS, FRATERNAL OR ATHLETIC GROUPS, OR SCHOOL GROUPS?: YES
IN A TYPICAL WEEK, HOW MANY TIMES DO YOU TALK ON THE PHONE WITH FAMILY, FRIENDS, OR NEIGHBORS?: MORE THAN THREE TIMES A WEEK

## 2022-09-19 NOTE — H&P (VIEW-ONLY)
[facility]  [address] [facility] [address]  Phone: [phone]  Primary Provider: Lakeisha Albert  Pre-op Performing Provider:    DIANNE PENDLETON IS    PREOPERATIVE EVALUATION:  Today's date: 9/19/2022    Sammie Ramirez is a 46 year old female who presents for a preoperative evaluation.    Surgical Information:  Surgery/Procedure: Xi Robotic Assisted laparoscopic right ovarian cystectomy, endometriosis resection/fulguration, cervical biopsy, and endometrial biopsy  Surgery Location: [facility]  Surgeon: Mckinley  Surgery Date: 9/28/2022  Time of Surgery: 9:55 am  Where patient plans to recover: At home with family  Fax number for surgical facility: Note does not need to be faxed, will be available electronically in Epic.    Type of Anesthesia Anticipated: General    Assessment & Plan     The proposed surgical procedure is considered INTERMEDIATE risk.  Patient cleared for upcoming procedure.  She can have the labs ordered by her gynecology specialist done on day of surgery and stat labs.  See after visit summary for helpful information and advice given to patient.    Pre-operative general physical examination    Right ovarian cyst    Pelvic pain in female                   RECOMMENDATION:  APPROVAL GIVEN to proceed with proposed procedure pending review of diagnostic evaluation on day of surgery of labs ordered by oncology specialist.                Subjective     HPI related to upcoming procedure: Patient has chronic pelvic discomfort and a left ovarian cyst which will be addressed through her upcoming procedure.    Preop Questions 9/19/2022   1. Have you ever had a heart attack or stroke? No   2. Have you ever had surgery on your heart or blood vessels, such as a stent placement, a coronary artery bypass, or surgery on an artery in your head, neck, heart, or legs? No   3. Do you have chest pain with activity? No   4. Do you have a  history of  heart failure? No   5. Do you currently have a cold, bronchitis or symptoms of other infection? No   6. Do you have a cough, shortness of breath, or wheezing? No   7. Do you or anyone in your family have previous history of blood clots? No   8. Do you or does anyone in your family have a serious bleeding problem such as prolonged bleeding following surgeries or cuts? No   9. Have you ever had problems with anemia or been told to take iron pills? No   10. Have you had any abnormal blood loss such as black, tarry or bloody stools, or abnormal vaginal bleeding? No   11. Have you ever had a blood transfusion? No   12. Are you willing to have a blood transfusion if it is medically needed before, during, or after your surgery? Yes   13. Have you or any of your relatives ever had problems with anesthesia? No   14. Do you have sleep apnea, excessive snoring or daytime drowsiness? No   15. Do you have any artifical heart valves or other implanted medical devices like a pacemaker, defibrillator, or continuous glucose monitor? No   16. Do you have artificial joints? No   17. Are you allergic to latex? No   18. Is there any chance that you may be pregnant? No       Health Care Directive:  Patient does not have a Health Care Directive or Living Will: Discussed advance care planning with patient; information given to patient to review.    Preoperative Review of :   reviewed - no record of controlled substances prescribed.          Review of Systems  Constitutional, neuro, ENT, endocrine, pulmonary, cardiac, gastrointestinal, genitourinary, musculoskeletal, integument and psychiatric systems are negative, except as otherwise noted.     At time of exam, patient has no acute physical or mental health concerns.    Patient has chronic pelvic discomfort which is going to be addressed by upcoming procedure.    Patient averages 1-2 alcoholic drinks per week.    Patient Active Problem List    Diagnosis Date Noted      Pain in joint of right shoulder 12/02/2020     Priority: Medium     Migraine without aura and without status migrainosus, not intractable 09/03/2013     Priority: Medium     more headaches with maxalt but works really well, hung over feeling on imitrex, stopped propranolol due to side effects, consider amitriptyline to see if helps decreased frequency.       Seasonal allergic rhinitis 02/12/2013     Priority: Medium     Sensorineural hearing loss, bilateral 05/24/2012     Priority: Medium     S/P LEEP of cervix 02/14/2012     Priority: Medium     Hx of LEEP x 2  2/14/12: LSIL  10/3/12: NIL.  Plan pap in 1 yr.  10/2013 ASCUS neg HPV.  Plan pap in 1 yr. Per ASCCP algorithms, needs two negative cotests at 12 & 24 months after LEEP before going to cotest in 3 yrs.  10/2014: NIL pap, neg HPV. Plan cotest pap & HPV in 1 year. Tracking started.  2/9/2016 Ascus Neg HPV, plan: cotest one year per MD. Tracking.   05/01/17: NIL pap, Neg HR HPV result. Plan cotest in 1 year per provider.  5/15/18 [facility] - Endocervical cells, Neg HPV. Plan colp and endometrial sampling per guidelines.   06/14/18: [place] bx Suggestive of LSIL ROBERT-1 and Cervicitis. ECC Neg for dyplasia. EMB neg for dysplasia. Plan cotest in 1 year.   07/19/19 Patient is lost to pap tracking follow-up.   2/10/20 NIL pap, Neg HPV. Plan cotest in 1 year.    03/1/21 NIL, +HR HPV, not 16/18. Plan [facility] bef 6/1/21 5/24/21 [facility] Bx & ECC: benign. Plan 1 year cotest  8/15/22 LSIL Pap, neg HPV. Plan: colp by 11/15/22  8/26/22 left msg to call back.   8/31/2022 Message left to return call.   9/6/22 Letter sent to pt  9/28/22 appt (added to scheduled surgery)            Past Medical History:   Diagnosis Date     ASCUS (atypical squamous cells of undetermined significance) on Pap smear 10/13/2016    Neg HPV     History of colposcopy 06/14/2018 5/24/21     LSIL (low grade squamous intraepithelial lesion) on Pap smear 02/01/2014     Past Surgical History:   Procedure Laterality  "Date     LEEP TX, CERVICAL  x 2 per chart    historical     Current Outpatient Medications   Medication Sig Dispense Refill     hydrocortisone, Perianal, (ANUSOL-HC) 2.5 % cream APPLY RECTALLY TO THE AFFECTED AREA TWICE DAILY 60 g 1     levocetirizine (XYZAL) 5 MG tablet Take 1 tablet (5 mg) by mouth every evening 30 tablet 1     meloxicam (MOBIC) 15 MG tablet Take 1 tablet (15 mg) by mouth daily 30 tablet 3     mometasone (NASONEX) 50 MCG/ACT nasal spray Spray 2 sprays into both nostrils daily 17 g 1     montelukast (SINGULAIR) 10 MG tablet Take 1 tablet (10 mg) by mouth At Bedtime 30 tablet 1     omeprazole (PRILOSEC) 40 MG DR capsule Take 1 capsule (40 mg) by mouth daily 90 capsule 3     ondansetron (ZOFRAN) 8 MG tablet Take 1 tablet (8 mg) by mouth every 8 hours as needed for nausea 20 tablet 1     polyethylene glycol (MIRALAX) 17 GM/Dose powder Take 17 g (1 capful) by mouth daily 500 g 0     rizatriptan (MAXALT) 10 MG tablet Take 1 tablet (10 mg) by mouth at onset of headache for migraine May repeat in 2 hours. Max 3 tablets/24 hours. 18 tablet 1     SUMAtriptan (IMITREX) 100 MG tablet Take 1 tablet (100 mg) by mouth at onset of headache for migraine that occur at night.  Take rizatriptan for migraines during the day. 9 tablet 3     triamcinolone (KENALOG) 0.1 % external cream APPLY SPARINGLY TOPICALLY TO THE AFFECTED AREA THREE TIMES DAILY FOR 14 DAYS 30 g 0       Allergies   Allergen Reactions     Amoxicillin      Rash, hives     Codeine Sulfate      Lactose      Seasonal Allergies         Social History     Tobacco Use     Smoking status: Never Smoker     Smokeless tobacco: Never Used   Substance Use Topics     Alcohol use: Yes     Comment: wine       History   Drug Use No         Objective     /82   Pulse 67   Temp 98.2  F (36.8  C) (Oral)   Resp 16   Ht 1.626 m (5' 4\")   Wt 59.6 kg (131 lb 8 oz)   LMP 09/03/2022 (Approximate)   SpO2 100%   BMI 22.57 kg/m      Physical Exam  General: Vital " signs reviewed.  Patient is in no acute appearing distress.  Breathing appears nonlabored.  Patient is alert and oriented ×3.      ENT: Ear exam shows bilateral tympanic membranes to be clear without injection, nasal turbinates show no injection or edema, no pharyngeal injection or exudate.    Neck: supple with no adenoapthy, palpable abnormal masses, or thyroid abnormality.    Eyes: No scleral, lid, or periorbital injection or edema noted.  No eye mattering noted.  Corneas are clear. Pupils are equal round and reactive to light with normal consensual eye movement.    Heart: Heart rate is regular without murmur.    Lungs: Lungs are clear to auscultation with good airflow bilaterally.    Abdomen:  Abdomen is soft, and tender without guarding over mid pelvic region.  This discomfort is similar to previous exams per patient and related to what she will be having an upcoming surgery to address.  No palpable abnormal masses or organomegaly.  Bowel sounds are normal.    Back: No areas of tenderness.    Skin: Warm and dry, with no rash or abnormal lesions noted.    Extremities: No lower leg edema noted.  No joint edema or restricted range of motion noted.    Neuro: No acute focal deficits or other abnormalities noted.    Psych: Patient is very pleasant, making good eye contact, with clear and fluent speech.  Answers questions appropriately. No psychomotor agitation.       Recent Labs   Lab Test 08/15/22  1015 03/01/21  0843   HGB 14.9 13.8    184    140   POTASSIUM 3.7 3.6   CR 0.75 0.78        Diagnostics:  No labs were ordered during this visit.   No EKG required, no history of coronary heart disease, significant arrhythmia, peripheral arterial disease or other structural heart disease.    Revised Cardiac Risk Index (RCRI):  The patient has the following serious cardiovascular risks for perioperative complications:   - No serious cardiac risks = 0 points     RCRI Interpretation: 0 points: Class I (very low  risk - 0.4% complication rate)           Signed Electronically by: Curtis Ramirez DO  Copy of this evaluation report is provided to requesting physician.

## 2022-09-19 NOTE — PROGRESS NOTES
[facility]  [address] [address] [facility] [address]  Phone: [phone]  Primary Provider: Lakeisha Albert  Pre-op Performing Provider:    DIANNE PENDLETON IS    PREOPERATIVE EVALUATION:  Today's date: 9/19/2022    Sammie Ramirez is a 46 year old female who presents for a preoperative evaluation.    Surgical Information:  Surgery/Procedure: Xi Robotic Assisted laparoscopic right ovarian cystectomy, endometriosis resection/fulguration, cervical biopsy, and endometrial biopsy  Surgery Location: [facility]  Surgeon: Mckinley  Surgery Date: 9/28/2022  Time of Surgery: 9:55 am  Where patient plans to recover: At home with family  Fax number for surgical facility: Note does not need to be faxed, will be available electronically in Epic.    Type of Anesthesia Anticipated: General    Assessment & Plan     The proposed surgical procedure is considered INTERMEDIATE risk.  Patient cleared for upcoming procedure.  She can have the labs ordered by her gynecology specialist done on day of surgery and stat labs.  See after visit summary for helpful information and advice given to patient.    Pre-operative general physical examination    Right ovarian cyst    Pelvic pain in female                   RECOMMENDATION:  APPROVAL GIVEN to proceed with proposed procedure pending review of diagnostic evaluation on day of surgery of labs ordered by oncology specialist.                Subjective     HPI related to upcoming procedure: Patient has chronic pelvic discomfort and a left ovarian cyst which will be addressed through her upcoming procedure.    Preop Questions 9/19/2022   1. Have you ever had a heart attack or stroke? No   2. Have you ever had surgery on your heart or blood vessels, such as a stent placement, a coronary artery bypass, or surgery on an artery in your head, neck, heart, or legs? No   3. Do you have chest pain with activity? No   4. Do you have a  history of  heart failure? No   5. Do you currently have a cold, bronchitis or symptoms of other infection? No   6. Do you have a cough, shortness of breath, or wheezing? No   7. Do you or anyone in your family have previous history of blood clots? No   8. Do you or does anyone in your family have a serious bleeding problem such as prolonged bleeding following surgeries or cuts? No   9. Have you ever had problems with anemia or been told to take iron pills? No   10. Have you had any abnormal blood loss such as black, tarry or bloody stools, or abnormal vaginal bleeding? No   11. Have you ever had a blood transfusion? No   12. Are you willing to have a blood transfusion if it is medically needed before, during, or after your surgery? Yes   13. Have you or any of your relatives ever had problems with anesthesia? No   14. Do you have sleep apnea, excessive snoring or daytime drowsiness? No   15. Do you have any artifical heart valves or other implanted medical devices like a pacemaker, defibrillator, or continuous glucose monitor? No   16. Do you have artificial joints? No   17. Are you allergic to latex? No   18. Is there any chance that you may be pregnant? No       Health Care Directive:  Patient does not have a Health Care Directive or Living Will: Discussed advance care planning with patient; information given to patient to review.    Preoperative Review of :   reviewed - no record of controlled substances prescribed.          Review of Systems  Constitutional, neuro, ENT, endocrine, pulmonary, cardiac, gastrointestinal, genitourinary, musculoskeletal, integument and psychiatric systems are negative, except as otherwise noted.     At time of exam, patient has no acute physical or mental health concerns.    Patient has chronic pelvic discomfort which is going to be addressed by upcoming procedure.    Patient averages 1-2 alcoholic drinks per week.    Patient Active Problem List    Diagnosis Date Noted      Pain in joint of right shoulder 12/02/2020     Priority: Medium     Migraine without aura and without status migrainosus, not intractable 09/03/2013     Priority: Medium     more headaches with maxalt but works really well, hung over feeling on imitrex, stopped propranolol due to side effects, consider amitriptyline to see if helps decreased frequency.       Seasonal allergic rhinitis 02/12/2013     Priority: Medium     Sensorineural hearing loss, bilateral 05/24/2012     Priority: Medium     S/P LEEP of cervix 02/14/2012     Priority: Medium     Hx of LEEP x 2  2/14/12: LSIL  10/3/12: NIL.  Plan pap in 1 yr.  10/2013 ASCUS neg HPV.  Plan pap in 1 yr. Per ASCCP algorithms, needs two negative cotests at 12 & 24 months after LEEP before going to cotest in 3 yrs.  10/2014: NIL pap, neg HPV. Plan cotest pap & HPV in 1 year. Tracking started.  2/9/2016 Ascus Neg HPV, plan: cotest one year per MD. Tracking.   05/01/17: NIL pap, Neg HR HPV result. Plan cotest in 1 year per provider.  5/15/18 [facility] - Endocervical cells, Neg HPV. Plan colp and endometrial sampling per guidelines.   06/14/18: [facility] bx Suggestive of LSIL ROBERT-1 and Cervicitis. ECC Neg for dyplasia. EMB neg for dysplasia. Plan cotest in 1 year.   07/19/19 Patient is lost to pap tracking follow-up.   2/10/20 NIL pap, Neg HPV. Plan cotest in 1 year.    03/1/21 NIL, +HR HPV, not 16/18. Plan [place] bef 6/1/21 5/24/21 [facility] Bx & ECC: benign. Plan 1 year cotest  8/15/22 LSIL Pap, neg HPV. Plan: colp by 11/15/22  8/26/22 left msg to call back.   8/31/2022 Message left to return call.   9/6/22 Letter sent to pt  9/28/22 appt (added to scheduled surgery)            Past Medical History:   Diagnosis Date     ASCUS (atypical squamous cells of undetermined significance) on Pap smear 10/13/2016    Neg HPV     History of colposcopy 06/14/2018 5/24/21     LSIL (low grade squamous intraepithelial lesion) on Pap smear 02/01/2014     Past Surgical History:   Procedure Laterality  "Date     LEEP TX, CERVICAL  x 2 per chart    historical     Current Outpatient Medications   Medication Sig Dispense Refill     hydrocortisone, Perianal, (ANUSOL-HC) 2.5 % cream APPLY RECTALLY TO THE AFFECTED AREA TWICE DAILY 60 g 1     levocetirizine (XYZAL) 5 MG tablet Take 1 tablet (5 mg) by mouth every evening 30 tablet 1     meloxicam (MOBIC) 15 MG tablet Take 1 tablet (15 mg) by mouth daily 30 tablet 3     mometasone (NASONEX) 50 MCG/ACT nasal spray Spray 2 sprays into both nostrils daily 17 g 1     montelukast (SINGULAIR) 10 MG tablet Take 1 tablet (10 mg) by mouth At Bedtime 30 tablet 1     omeprazole (PRILOSEC) 40 MG DR capsule Take 1 capsule (40 mg) by mouth daily 90 capsule 3     ondansetron (ZOFRAN) 8 MG tablet Take 1 tablet (8 mg) by mouth every 8 hours as needed for nausea 20 tablet 1     polyethylene glycol (MIRALAX) 17 GM/Dose powder Take 17 g (1 capful) by mouth daily 500 g 0     rizatriptan (MAXALT) 10 MG tablet Take 1 tablet (10 mg) by mouth at onset of headache for migraine May repeat in 2 hours. Max 3 tablets/24 hours. 18 tablet 1     SUMAtriptan (IMITREX) 100 MG tablet Take 1 tablet (100 mg) by mouth at onset of headache for migraine that occur at night.  Take rizatriptan for migraines during the day. 9 tablet 3     triamcinolone (KENALOG) 0.1 % external cream APPLY SPARINGLY TOPICALLY TO THE AFFECTED AREA THREE TIMES DAILY FOR 14 DAYS 30 g 0       Allergies   Allergen Reactions     Amoxicillin      Rash, hives     Codeine Sulfate      Lactose      Seasonal Allergies         Social History     Tobacco Use     Smoking status: Never Smoker     Smokeless tobacco: Never Used   Substance Use Topics     Alcohol use: Yes     Comment: wine       History   Drug Use No         Objective     /82   Pulse 67   Temp 98.2  F (36.8  C) (Oral)   Resp 16   Ht 1.626 m (5' 4\")   Wt 59.6 kg (131 lb 8 oz)   LMP 09/03/2022 (Approximate)   SpO2 100%   BMI 22.57 kg/m      Physical Exam  General: Vital " signs reviewed.  Patient is in no acute appearing distress.  Breathing appears nonlabored.  Patient is alert and oriented ×3.      ENT: Ear exam shows bilateral tympanic membranes to be clear without injection, nasal turbinates show no injection or edema, no pharyngeal injection or exudate.    Neck: supple with no adenoapthy, palpable abnormal masses, or thyroid abnormality.    Eyes: No scleral, lid, or periorbital injection or edema noted.  No eye mattering noted.  Corneas are clear. Pupils are equal round and reactive to light with normal consensual eye movement.    Heart: Heart rate is regular without murmur.    Lungs: Lungs are clear to auscultation with good airflow bilaterally.    Abdomen:  Abdomen is soft, and tender without guarding over mid pelvic region.  This discomfort is similar to previous exams per patient and related to what she will be having an upcoming surgery to address.  No palpable abnormal masses or organomegaly.  Bowel sounds are normal.    Back: No areas of tenderness.    Skin: Warm and dry, with no rash or abnormal lesions noted.    Extremities: No lower leg edema noted.  No joint edema or restricted range of motion noted.    Neuro: No acute focal deficits or other abnormalities noted.    Psych: Patient is very pleasant, making good eye contact, with clear and fluent speech.  Answers questions appropriately. No psychomotor agitation.       Recent Labs   Lab Test 08/15/22  1015 03/01/21  0843   HGB 14.9 13.8    184    140   POTASSIUM 3.7 3.6   CR 0.75 0.78        Diagnostics:  No labs were ordered during this visit.   No EKG required, no history of coronary heart disease, significant arrhythmia, peripheral arterial disease or other structural heart disease.    Revised Cardiac Risk Index (RCRI):  The patient has the following serious cardiovascular risks for perioperative complications:   - No serious cardiac risks = 0 points     RCRI Interpretation: 0 points: Class I (very low  risk - 0.4% complication rate)           Signed Electronically by: Curtis Ramirez DO  Copy of this evaluation report is provided to requesting physician.

## 2022-09-26 ENCOUNTER — LAB (OUTPATIENT)
Dept: LAB | Facility: CLINIC | Age: 47
End: 2022-09-26
Payer: MEDICARE

## 2022-09-26 DIAGNOSIS — Z20.822 ENCOUNTER FOR LABORATORY TESTING FOR COVID-19 VIRUS: ICD-10-CM

## 2022-09-26 LAB — SARS-COV-2 RNA RESP QL NAA+PROBE: NEGATIVE

## 2022-09-26 PROCEDURE — U0003 INFECTIOUS AGENT DETECTION BY NUCLEIC ACID (DNA OR RNA); SEVERE ACUTE RESPIRATORY SYNDROME CORONAVIRUS 2 (SARS-COV-2) (CORONAVIRUS DISEASE [COVID-19]), AMPLIFIED PROBE TECHNIQUE, MAKING USE OF HIGH THROUGHPUT TECHNOLOGIES AS DESCRIBED BY CMS-2020-01-R: HCPCS

## 2022-09-26 PROCEDURE — U0005 INFEC AGEN DETEC AMPLI PROBE: HCPCS

## 2022-09-28 ENCOUNTER — ANESTHESIA EVENT (OUTPATIENT)
Dept: SURGERY | Facility: CLINIC | Age: 47
End: 2022-09-28
Payer: MEDICARE

## 2022-09-28 ENCOUNTER — HOSPITAL ENCOUNTER (OUTPATIENT)
Facility: CLINIC | Age: 47
Discharge: HOME | End: 2022-09-28
Attending: FAMILY MEDICINE | Admitting: FAMILY MEDICINE
Payer: MEDICARE

## 2022-09-28 ENCOUNTER — ANESTHESIA (OUTPATIENT)
Dept: SURGERY | Facility: CLINIC | Age: 47
End: 2022-09-28
Payer: MEDICARE

## 2022-09-28 ENCOUNTER — OFFICE VISIT (OUTPATIENT)
Dept: INTERPRETER SERVICES | Facility: CLINIC | Age: 47
End: 2022-09-28
Payer: MEDICARE

## 2022-09-28 VITALS
DIASTOLIC BLOOD PRESSURE: 71 MMHG | OXYGEN SATURATION: 99 % | BODY MASS INDEX: 21.85 KG/M2 | SYSTOLIC BLOOD PRESSURE: 124 MMHG | HEART RATE: 56 BPM | WEIGHT: 128 LBS | TEMPERATURE: 97.5 F | RESPIRATION RATE: 10 BRPM | HEIGHT: 64 IN

## 2022-09-28 DIAGNOSIS — Z98.890 S/P LAPAROSCOPY: Primary | ICD-10-CM

## 2022-09-28 LAB
HCG UR QL: NEGATIVE
HGB BLD-MCNC: 13.5 G/DL (ref 11.7–15.7)

## 2022-09-28 PROCEDURE — 360N000080 HC SURGERY LEVEL 7, PER MIN: Performed by: FAMILY MEDICINE

## 2022-09-28 PROCEDURE — 250N000009 HC RX 250: Performed by: NURSE ANESTHETIST, CERTIFIED REGISTERED

## 2022-09-28 PROCEDURE — 370N000017 HC ANESTHESIA TECHNICAL FEE, PER MIN: Performed by: FAMILY MEDICINE

## 2022-09-28 PROCEDURE — 710N000012 HC RECOVERY PHASE 2, PER MINUTE: Performed by: FAMILY MEDICINE

## 2022-09-28 PROCEDURE — 250N000009 HC RX 250: Performed by: FAMILY MEDICINE

## 2022-09-28 PROCEDURE — 250N000013 HC RX MED GY IP 250 OP 250 PS 637: Performed by: FAMILY MEDICINE

## 2022-09-28 PROCEDURE — 710N000009 HC RECOVERY PHASE 1, LEVEL 1, PER MIN: Performed by: FAMILY MEDICINE

## 2022-09-28 PROCEDURE — 258N000003 HC RX IP 258 OP 636: Performed by: NURSE ANESTHETIST, CERTIFIED REGISTERED

## 2022-09-28 PROCEDURE — 85018 HEMOGLOBIN: CPT | Performed by: FAMILY MEDICINE

## 2022-09-28 PROCEDURE — 81025 URINE PREGNANCY TEST: CPT | Performed by: FAMILY MEDICINE

## 2022-09-28 PROCEDURE — 58662 LAPAROSCOPY EXCISE LESIONS: CPT | Performed by: FAMILY MEDICINE

## 2022-09-28 PROCEDURE — 250N000011 HC RX IP 250 OP 636: Performed by: NURSE ANESTHETIST, CERTIFIED REGISTERED

## 2022-09-28 PROCEDURE — T1013 SIGN LANG/ORAL INTERPRETER: HCPCS | Mod: U3

## 2022-09-28 PROCEDURE — S2900 ROBOTIC SURGICAL SYSTEM: HCPCS | Performed by: FAMILY MEDICINE

## 2022-09-28 PROCEDURE — 250N000011 HC RX IP 250 OP 636: Performed by: ANESTHESIOLOGY

## 2022-09-28 PROCEDURE — 272N000001 HC OR GENERAL SUPPLY STERILE: Performed by: FAMILY MEDICINE

## 2022-09-28 PROCEDURE — 258N000003 HC RX IP 258 OP 636: Performed by: FAMILY MEDICINE

## 2022-09-28 PROCEDURE — 999N000141 HC STATISTIC PRE-PROCEDURE NURSING ASSESSMENT: Performed by: FAMILY MEDICINE

## 2022-09-28 PROCEDURE — 250N000009 HC RX 250: Performed by: ANESTHESIOLOGY

## 2022-09-28 PROCEDURE — 250N000011 HC RX IP 250 OP 636: Performed by: FAMILY MEDICINE

## 2022-09-28 PROCEDURE — 88305 TISSUE EXAM BY PATHOLOGIST: CPT | Mod: TC | Performed by: FAMILY MEDICINE

## 2022-09-28 PROCEDURE — 36415 COLL VENOUS BLD VENIPUNCTURE: CPT | Performed by: FAMILY MEDICINE

## 2022-09-28 PROCEDURE — 258N000003 HC RX IP 258 OP 636: Performed by: ANESTHESIOLOGY

## 2022-09-28 RX ORDER — ONDANSETRON 2 MG/ML
4 INJECTION INTRAMUSCULAR; INTRAVENOUS EVERY 30 MIN PRN
Status: DISCONTINUED | OUTPATIENT
Start: 2022-09-28 | End: 2022-09-28 | Stop reason: HOSPADM

## 2022-09-28 RX ORDER — SODIUM CHLORIDE, SODIUM LACTATE, POTASSIUM CHLORIDE, CALCIUM CHLORIDE 600; 310; 30; 20 MG/100ML; MG/100ML; MG/100ML; MG/100ML
INJECTION, SOLUTION INTRAVENOUS CONTINUOUS
Status: DISCONTINUED | OUTPATIENT
Start: 2022-09-28 | End: 2022-09-28 | Stop reason: HOSPADM

## 2022-09-28 RX ORDER — INDOCYANINE GREEN AND WATER 25 MG
KIT INJECTION PRN
Status: DISCONTINUED | OUTPATIENT
Start: 2022-09-28 | End: 2022-09-28

## 2022-09-28 RX ORDER — ACETAMINOPHEN 325 MG/1
975 TABLET ORAL EVERY 6 HOURS PRN
Qty: 50 TABLET | Refills: 0 | Status: SHIPPED | OUTPATIENT
Start: 2022-09-28 | End: 2023-08-04

## 2022-09-28 RX ORDER — PROPOFOL 10 MG/ML
INJECTION, EMULSION INTRAVENOUS PRN
Status: DISCONTINUED | OUTPATIENT
Start: 2022-09-28 | End: 2022-09-28

## 2022-09-28 RX ORDER — ONDANSETRON 2 MG/ML
INJECTION INTRAMUSCULAR; INTRAVENOUS PRN
Status: DISCONTINUED | OUTPATIENT
Start: 2022-09-28 | End: 2022-09-28

## 2022-09-28 RX ORDER — IBUPROFEN 800 MG/1
800 TABLET, FILM COATED ORAL ONCE
Status: DISCONTINUED | OUTPATIENT
Start: 2022-09-28 | End: 2022-09-28 | Stop reason: HOSPADM

## 2022-09-28 RX ORDER — HYDROMORPHONE HYDROCHLORIDE 2 MG/1
2 TABLET ORAL
Status: DISCONTINUED | OUTPATIENT
Start: 2022-09-28 | End: 2022-09-28 | Stop reason: HOSPADM

## 2022-09-28 RX ORDER — FENTANYL CITRATE 50 UG/ML
INJECTION, SOLUTION INTRAMUSCULAR; INTRAVENOUS PRN
Status: DISCONTINUED | OUTPATIENT
Start: 2022-09-28 | End: 2022-09-28

## 2022-09-28 RX ORDER — OXYCODONE HYDROCHLORIDE 5 MG/1
5 TABLET ORAL EVERY 4 HOURS PRN
Status: DISCONTINUED | OUTPATIENT
Start: 2022-09-28 | End: 2022-09-28 | Stop reason: HOSPADM

## 2022-09-28 RX ORDER — FENTANYL CITRATE 50 UG/ML
25 INJECTION, SOLUTION INTRAMUSCULAR; INTRAVENOUS EVERY 5 MIN PRN
Status: DISCONTINUED | OUTPATIENT
Start: 2022-09-28 | End: 2022-09-28 | Stop reason: HOSPADM

## 2022-09-28 RX ORDER — BUPIVACAINE HYDROCHLORIDE AND EPINEPHRINE 5; 5 MG/ML; UG/ML
INJECTION, SOLUTION PERINEURAL PRN
Status: DISCONTINUED | OUTPATIENT
Start: 2022-09-28 | End: 2022-09-28 | Stop reason: HOSPADM

## 2022-09-28 RX ORDER — HYDROXYZINE HYDROCHLORIDE 25 MG/1
25 TABLET, FILM COATED ORAL
Status: DISCONTINUED | OUTPATIENT
Start: 2022-09-28 | End: 2022-09-28 | Stop reason: HOSPADM

## 2022-09-28 RX ORDER — CLINDAMYCIN PHOSPHATE 900 MG/50ML
900 INJECTION, SOLUTION INTRAVENOUS
Status: COMPLETED | OUTPATIENT
Start: 2022-09-28 | End: 2022-09-28

## 2022-09-28 RX ORDER — GLYCOPYRROLATE 0.2 MG/ML
INJECTION, SOLUTION INTRAMUSCULAR; INTRAVENOUS PRN
Status: DISCONTINUED | OUTPATIENT
Start: 2022-09-28 | End: 2022-09-28

## 2022-09-28 RX ORDER — ACETAMINOPHEN 325 MG/1
975 TABLET ORAL ONCE
Status: COMPLETED | OUTPATIENT
Start: 2022-09-28 | End: 2022-09-28

## 2022-09-28 RX ORDER — HYDROMORPHONE HCL IN WATER/PF 6 MG/30 ML
0.2 PATIENT CONTROLLED ANALGESIA SYRINGE INTRAVENOUS EVERY 5 MIN PRN
Status: DISCONTINUED | OUTPATIENT
Start: 2022-09-28 | End: 2022-09-28 | Stop reason: HOSPADM

## 2022-09-28 RX ORDER — FENTANYL CITRATE 50 UG/ML
25 INJECTION, SOLUTION INTRAMUSCULAR; INTRAVENOUS
Status: DISCONTINUED | OUTPATIENT
Start: 2022-09-28 | End: 2022-09-28 | Stop reason: HOSPADM

## 2022-09-28 RX ORDER — DEXAMETHASONE SODIUM PHOSPHATE 4 MG/ML
INJECTION, SOLUTION INTRA-ARTICULAR; INTRALESIONAL; INTRAMUSCULAR; INTRAVENOUS; SOFT TISSUE PRN
Status: DISCONTINUED | OUTPATIENT
Start: 2022-09-28 | End: 2022-09-28

## 2022-09-28 RX ORDER — HYDROMORPHONE HYDROCHLORIDE 2 MG/1
2 TABLET ORAL
Status: COMPLETED | OUTPATIENT
Start: 2022-09-28 | End: 2022-09-28

## 2022-09-28 RX ORDER — PROPOFOL 10 MG/ML
INJECTION, EMULSION INTRAVENOUS CONTINUOUS PRN
Status: DISCONTINUED | OUTPATIENT
Start: 2022-09-28 | End: 2022-09-28

## 2022-09-28 RX ORDER — LABETALOL 20 MG/4 ML (5 MG/ML) INTRAVENOUS SYRINGE
PRN
Status: DISCONTINUED | OUTPATIENT
Start: 2022-09-28 | End: 2022-09-28

## 2022-09-28 RX ORDER — KETOROLAC TROMETHAMINE 30 MG/ML
30 INJECTION, SOLUTION INTRAMUSCULAR; INTRAVENOUS ONCE
Status: COMPLETED | OUTPATIENT
Start: 2022-09-28 | End: 2022-09-28

## 2022-09-28 RX ORDER — SCOLOPAMINE TRANSDERMAL SYSTEM 1 MG/1
1 PATCH, EXTENDED RELEASE TRANSDERMAL ONCE
Status: DISCONTINUED | OUTPATIENT
Start: 2022-09-28 | End: 2022-09-28 | Stop reason: HOSPADM

## 2022-09-28 RX ORDER — ACETAMINOPHEN 325 MG/1
975 TABLET ORAL ONCE
Status: DISCONTINUED | OUTPATIENT
Start: 2022-09-28 | End: 2022-09-28 | Stop reason: HOSPADM

## 2022-09-28 RX ORDER — HYDROMORPHONE HYDROCHLORIDE 2 MG/1
2-4 TABLET ORAL EVERY 4 HOURS PRN
Qty: 18 TABLET | Refills: 0 | Status: SHIPPED | OUTPATIENT
Start: 2022-09-28 | End: 2023-02-01

## 2022-09-28 RX ORDER — CLINDAMYCIN PHOSPHATE 900 MG/50ML
900 INJECTION, SOLUTION INTRAVENOUS SEE ADMIN INSTRUCTIONS
Status: DISCONTINUED | OUTPATIENT
Start: 2022-09-28 | End: 2022-09-28 | Stop reason: HOSPADM

## 2022-09-28 RX ORDER — AMOXICILLIN 250 MG
1-2 CAPSULE ORAL 2 TIMES DAILY
Qty: 30 TABLET | Refills: 0 | Status: SHIPPED | OUTPATIENT
Start: 2022-09-28 | End: 2023-02-01

## 2022-09-28 RX ORDER — ONDANSETRON 4 MG/1
4 TABLET, ORALLY DISINTEGRATING ORAL EVERY 30 MIN PRN
Status: DISCONTINUED | OUTPATIENT
Start: 2022-09-28 | End: 2022-09-28 | Stop reason: HOSPADM

## 2022-09-28 RX ORDER — ONDANSETRON 4 MG/1
4 TABLET, ORALLY DISINTEGRATING ORAL EVERY 8 HOURS PRN
Qty: 4 TABLET | Refills: 0 | Status: SHIPPED | OUTPATIENT
Start: 2022-09-28 | End: 2023-02-01 | Stop reason: DRUGHIGH

## 2022-09-28 RX ORDER — NEOSTIGMINE METHYLSULFATE 1 MG/ML
VIAL (ML) INJECTION PRN
Status: DISCONTINUED | OUTPATIENT
Start: 2022-09-28 | End: 2022-09-28

## 2022-09-28 RX ORDER — MEPERIDINE HYDROCHLORIDE 25 MG/ML
12.5 INJECTION INTRAMUSCULAR; INTRAVENOUS; SUBCUTANEOUS
Status: DISCONTINUED | OUTPATIENT
Start: 2022-09-28 | End: 2022-09-28 | Stop reason: HOSPADM

## 2022-09-28 RX ORDER — LIDOCAINE HYDROCHLORIDE 10 MG/ML
INJECTION, SOLUTION INFILTRATION; PERINEURAL PRN
Status: DISCONTINUED | OUTPATIENT
Start: 2022-09-28 | End: 2022-09-28

## 2022-09-28 RX ADMIN — ROCURONIUM BROMIDE 30 MG: 50 INJECTION, SOLUTION INTRAVENOUS at 10:53

## 2022-09-28 RX ADMIN — PROPOFOL 65 MCG/KG/MIN: 10 INJECTION, EMULSION INTRAVENOUS at 10:58

## 2022-09-28 RX ADMIN — GENTAMICIN SULFATE 290 MG: 40 INJECTION, SOLUTION INTRAMUSCULAR; INTRAVENOUS at 09:46

## 2022-09-28 RX ADMIN — INDOCYANINE GREEN AND WATER 5 MG: KIT at 11:30

## 2022-09-28 RX ADMIN — SODIUM CHLORIDE, POTASSIUM CHLORIDE, SODIUM LACTATE AND CALCIUM CHLORIDE: 600; 310; 30; 20 INJECTION, SOLUTION INTRAVENOUS at 11:07

## 2022-09-28 RX ADMIN — FENTANYL CITRATE 25 MCG: 50 INJECTION, SOLUTION INTRAMUSCULAR; INTRAVENOUS at 12:42

## 2022-09-28 RX ADMIN — MIDAZOLAM 2 MG: 1 INJECTION INTRAMUSCULAR; INTRAVENOUS at 10:46

## 2022-09-28 RX ADMIN — HYDROMORPHONE HYDROCHLORIDE 0.2 MG: 0.2 INJECTION, SOLUTION INTRAMUSCULAR; INTRAVENOUS; SUBCUTANEOUS at 12:55

## 2022-09-28 RX ADMIN — FENTANYL CITRATE 100 MCG: 50 INJECTION, SOLUTION INTRAMUSCULAR; INTRAVENOUS at 10:53

## 2022-09-28 RX ADMIN — LIDOCAINE HYDROCHLORIDE 30 MG: 10 INJECTION, SOLUTION INFILTRATION; PERINEURAL at 10:53

## 2022-09-28 RX ADMIN — PROPOFOL 200 MG: 10 INJECTION, EMULSION INTRAVENOUS at 10:53

## 2022-09-28 RX ADMIN — PHENYLEPHRINE HYDROCHLORIDE 100 MCG: 10 INJECTION INTRAVENOUS at 10:58

## 2022-09-28 RX ADMIN — KETOROLAC TROMETHAMINE 30 MG: 30 INJECTION, SOLUTION INTRAMUSCULAR at 08:48

## 2022-09-28 RX ADMIN — PHENYLEPHRINE HYDROCHLORIDE 100 MCG: 10 INJECTION INTRAVENOUS at 11:07

## 2022-09-28 RX ADMIN — ONDANSETRON 4 MG: 2 INJECTION INTRAMUSCULAR; INTRAVENOUS at 12:21

## 2022-09-28 RX ADMIN — SCOPALAMINE 1 PATCH: 1 PATCH, EXTENDED RELEASE TRANSDERMAL at 08:43

## 2022-09-28 RX ADMIN — ACETAMINOPHEN 975 MG: 325 TABLET, FILM COATED ORAL at 08:51

## 2022-09-28 RX ADMIN — GLYCOPYRROLATE 0.4 MG: 0.2 INJECTION, SOLUTION INTRAMUSCULAR; INTRAVENOUS at 11:51

## 2022-09-28 RX ADMIN — GLYCOPYRROLATE 0.2 MG: 0.2 INJECTION, SOLUTION INTRAMUSCULAR; INTRAVENOUS at 10:53

## 2022-09-28 RX ADMIN — HYDROMORPHONE HYDROCHLORIDE 0.2 MG: 0.2 INJECTION, SOLUTION INTRAMUSCULAR; INTRAVENOUS; SUBCUTANEOUS at 12:48

## 2022-09-28 RX ADMIN — PHENYLEPHRINE HYDROCHLORIDE 100 MCG: 10 INJECTION INTRAVENOUS at 11:12

## 2022-09-28 RX ADMIN — PROCHLORPERAZINE EDISYLATE 5 MG: 5 INJECTION INTRAMUSCULAR; INTRAVENOUS at 12:46

## 2022-09-28 RX ADMIN — NEOSTIGMINE METHYLSULFATE 2.5 MG: 1 INJECTION, SOLUTION INTRAVENOUS at 11:51

## 2022-09-28 RX ADMIN — ONDANSETRON HYDROCHLORIDE 4 MG: 2 INJECTION, SOLUTION INTRAVENOUS at 11:48

## 2022-09-28 RX ADMIN — PROPOFOL 30 MG: 10 INJECTION, EMULSION INTRAVENOUS at 11:24

## 2022-09-28 RX ADMIN — CLINDAMYCIN PHOSPHATE 900 MG: 900 INJECTION, SOLUTION INTRAVENOUS at 08:56

## 2022-09-28 RX ADMIN — SODIUM CHLORIDE, POTASSIUM CHLORIDE, SODIUM LACTATE AND CALCIUM CHLORIDE: 600; 310; 30; 20 INJECTION, SOLUTION INTRAVENOUS at 12:27

## 2022-09-28 RX ADMIN — SODIUM CHLORIDE, POTASSIUM CHLORIDE, SODIUM LACTATE AND CALCIUM CHLORIDE: 600; 310; 30; 20 INJECTION, SOLUTION INTRAVENOUS at 10:42

## 2022-09-28 RX ADMIN — FENTANYL CITRATE 25 MCG: 50 INJECTION, SOLUTION INTRAMUSCULAR; INTRAVENOUS at 12:19

## 2022-09-28 RX ADMIN — DEXAMETHASONE SODIUM PHOSPHATE 8 MG: 4 INJECTION, SOLUTION INTRA-ARTICULAR; INTRALESIONAL; INTRAMUSCULAR; INTRAVENOUS; SOFT TISSUE at 10:53

## 2022-09-28 RX ADMIN — FENTANYL CITRATE 25 MCG: 50 INJECTION, SOLUTION INTRAMUSCULAR; INTRAVENOUS at 12:24

## 2022-09-28 RX ADMIN — LABETALOL 20 MG/4 ML (5 MG/ML) INTRAVENOUS SYRINGE 5 MG: at 11:33

## 2022-09-28 RX ADMIN — HYDROMORPHONE HYDROCHLORIDE 2 MG: 2 TABLET ORAL at 13:00

## 2022-09-28 RX ADMIN — FENTANYL CITRATE 25 MCG: 50 INJECTION, SOLUTION INTRAMUSCULAR; INTRAVENOUS at 12:34

## 2022-09-28 RX ADMIN — LABETALOL 20 MG/4 ML (5 MG/ML) INTRAVENOUS SYRINGE 5 MG: at 11:26

## 2022-09-28 ASSESSMENT — ACTIVITIES OF DAILY LIVING (ADL)
ADLS_ACUITY_SCORE: 35

## 2022-09-28 NOTE — DISCHARGE INSTRUCTIONS
Follow up in 1-2 weeks   Call [phone] for post op concerns, this will get you to the answering service for the on-call doctor.     Things to be concerned with is: uncontrolled vomiting, bleeding that is more than spotting, pain that doesn't respond to oxycodone and ibuprofen and tylenol   Also fever or general unwell feeling or increased pain.     Also it is ok to please also call for any reason or concern!     Dr. Krystin Sen, DO    OB/GYN   [facility]          GENERAL ANESTHESIA OR SEDATION ADULT DISCHARGE INSTRUCTIONS   SPECIAL PRECAUTIONS FOR 24 HOURS AFTER SURGERY    IT IS NOT UNUSUAL TO FEEL LIGHT-HEADED OR FAINT, UP TO 24 HOURS AFTER SURGERY OR WHILE TAKING PAIN MEDICATION.  IF YOU HAVE THESE SYMPTOMS; SIT FOR A FEW MINUTES BEFORE STANDING AND HAVE SOMEONE ASSIST YOU WHEN YOU GET UP TO WALK OR USE THE BATHROOM.    YOU SHOULD REST AND RELAX FOR THE NEXT 24 HOURS AND YOU MUST MAKE ARRANGEMENTS TO HAVE SOMEONE STAY WITH YOU FOR AT LEAST 24 HOURS AFTER YOUR DISCHARGE.  AVOID HAZARDOUS AND STRENUOUS ACTIVITIES.  DO NOT MAKE IMPORTANT DECISIONS FOR 24 HOURS.    DO NOT DRIVE ANY VEHICLE OR OPERATE MECHANICAL EQUIPMENT FOR 24 HOURS FOLLOWING THE END OF YOUR SURGERY.  EVEN THOUGH YOU MAY FEEL NORMAL, YOUR REACTIONS MAY BE AFFECTED BY THE MEDICATION YOU HAVE RECEIVED.    DO NOT DRINK ALCOHOLIC BEVERAGES FOR 24 HOURS FOLLOWING YOUR SURGERY.    DRINK CLEAR LIQUIDS (APPLE JUICE, GINGER ALE, 7-UP, BROTH, ETC.).  PROGRESS TO YOUR REGULAR DIET AS YOU FEEL ABLE.    YOU MAY HAVE A DRY MOUTH, A SORE THROAT, MUSCLES ACHES OR TROUBLE SLEEPING.  THESE SHOULD GO AWAY AFTER 24 HOURS.    CALL YOUR DOCTOR FOR ANY OF THE FOLLOWING:  SIGNS OF INFECTION (FEVER, GROWING TENDERNESS AT THE SURGERY SITE, A LARGE AMOUNT OF DRAINAGE OR BLEEDING, SEVERE PAIN, FOUL-SMELLING DRAINAGE, REDNESS OR SWELLING.    IT HAS BEEN OVER 8 TO 10 HOURS SINCE SURGERY AND YOU ARE STILL NOT ABLE TO URINATE (PASS  WATER).     You received Toradol, an IV form of Ibuprofen (Motrin) at 8:50am.  Do not take any Ibuprofen products until after 2:50pm.    Remove the scope patch from behind your ear no later than tomorrow morning at 8:45am.    DR. EMA ALCANTAR M.D.     CLINIC PHONE NUMBER:  [phone].  [facility]

## 2022-09-28 NOTE — BRIEF OP NOTE
[facility]    Brief Operative Note    Pre-operative diagnosis: Pelvic pain in female [R10.2]  Right ovarian cyst [N83.201]  Post-operative diagnosis 46 year old female with pelvic pain, right ovarian cyst on ultrasound s/p robotic assisted laparoscopic endometriosis fulguration/resection and endometrial biopsy     Procedure: Procedure(s):  Xi Robotic  endometriosis resection/fulguration, cervical biopsy, and endometrial biopsy  Surgeon: Surgeon(s) and Role:     * Krystin Sen DO - Primary  Anesthesia: General   Estimated Blood Loss: 5 mL from 9/28/2022 10:47 AM to 9/28/2022 12:07 PM      Drains: None  Specimens:   ID Type Source Tests Collected by Time Destination   1 : 6 o'clock cervical biopsy Biopsy Cervix SURGICAL PATHOLOGY EXAM Krystin Sen,  9/28/2022 11:12 AM    2 : 12 o'clock cervical biopsy Biopsy Cervix SURGICAL PATHOLOGY EXAM Krystin Sen,  9/28/2022 11:13 AM    3 : endometrial biopsy Biopsy Endometrium SURGICAL PATHOLOGY EXAM Krystin Sen,  9/28/2022 11:14 AM    4 : POSTERIOR CUL-DE-SAC Tissue Abdomen SURGICAL PATHOLOGY EXAM Krystin Sen,  9/28/2022 11:31 AM    5 : LEFT PELVIC SIDEWALL Tissue Pelvis, Left SURGICAL PATHOLOGY EXAM Krystin Sen,  9/28/2022 11:37 AM    6 : RIGHT PELVIC SIDEWALL Tissue Pelvis, Right SURGICAL PATHOLOGY EXAM Krystin Sen,  9/28/2022 11:39 AM    7 : ANTERIOR PERITONEUM Tissue Peritoneum SURGICAL PATHOLOGY EXAM Krystin Sen,  9/28/2022 11:42 AM      Findings:   normal uterus and bilateral fallopia tubes, normal right and left ovary, left ovary with endometriotic lesions present, endometriosis noted in the posterior cul-de-sac, anterior peritoneum, and bilateral pelvic side wall.  Complications: None.  Implants: * No implants in log *

## 2022-09-28 NOTE — OP NOTE
PREOPERATIVE DIAGNOSIS: 46 year old y/o   with pelvic pain, right ovarian cyst on ultrasound   POSTOPERATIVE DIAGNOSIS:  46 year old y/o   with pelvic pain, with lesions suspicious for endometriosis, normal right ovary today.     Surgeon: Dr. Sen   Assistant:  Adali Lu SA     PROCEDURES:   1. Robotic assisted laparoscopic endometriosis resection/fulguration.   2. Endometrial biopsy     INDICATIONS: 46 year old y/o   with pelvic pain, right ovarian cyst.  I counseled her on risks, benefits, alternatives of procedure and I recommend laparoscopic endometriosis biopsy/resection and removal of right ovarian cyst.     ICG-green is used to help assist in identifying endometriosis    FINDINGS: Normal uterus, 7 week size, and bilateral fallopia tubes, normal right and left ovary, left ovary with endometriotic lesions present, endometriosis noted in the posterior cul-de-sac, anterior peritoneum, and bilateral pelvic side wall    DESCRIPTION OF PROCEDURE: After informed consent was obtained, the patient was taken to the operating room where she was placed in dorsal supine position, placed under general anesthesia without difficulty and placed in dorsal lithotomy position. Exam under anesthesia reveals a small anteverted uterus. At this time the patient was prepped and draped in normal sterile fashion. A timeout was performed. A Henning catheter was placed without difficulty. Weatherby speculum placed in the patient's vaginal vault and the anterior lip of the cervix was grasped with a single-tooth tenaculum. An endometrial biopsy is performed.  Then a large V-Care manipulator was placed. Attention then turned to the patient's abdomen where Domo clamps are placed in the umbilicus and a Veress needle was entered into the patient's abdomen. Saline flowed easily through this and at this time the syringe is removed and the gas was hooked up and 3 liters of CO2 was insufflated. A 8 mm transverse  umbilical incision was made and a 8 mm port was placed through this and the laparoscope confirmed intra-abdominal placement. Attention is then turned towards the patient's abdomen where we measured 10 cm lateral and down on the right and 10 cm latera on the left side and 8 mm incisions are made after peritoneal mapping was performed and the robotic trocar and port are placed. A 5 mm incision is made 8 cm lateral and 3 cm superior to the midline port and a 5 mm port is placed. The da Everton robot was then docked and I turned my attention towards the pelvic area. The anterior peritoneum is resected in the midline in a small area where adhesions to the lower uterine segment are noted.  The posterior peritoneum in the cul-de-sac is identified and resected where lesions are noted.  Ureters are identified bilaterally with peristalsis before and after endometriosis resection is done. The right and left pelvic side wall peritoneum was removed from the boundary of the IP ligament to above the ureters to the uterine artery. This area was removed bilaterally and sent to pathology. Endometriosis is fulgurated on the left ovary.  The right ovary is normal and no cyst is present today.  Hemostasis is seen. Irrigation is performed. Seprafilm slurry was placed in the patient's abdomen. The robot was then undocked. The trocars were removed. The skin was closed with 4-0 Monocryl in a subcuticular fashion. All port sites were closed and steri-strips are placed over the incisions. Turning attention towards the vaginal area, the Henning catheter was removed. The V-Care is removed. The cervix is inspected using visualization with the bivalve speculum and is found to be hemostatic. The patient tolerated the procedure well. Sponge, lap and needle counts were correct x2. The patient was taken out of the dorsal lithotomy position, placed in dorsal supine position, awakened from anesthesia and taken to recovery room in stable condition. No  complications were apparent at time of procedure.   EMA ALCANTAR DO

## 2022-09-28 NOTE — ANESTHESIA POSTPROCEDURE EVALUATION
Patient: Sammie Ramirez    Procedure: Procedure(s):  Xi Robotic  endometriosis resection/fulguration, cervical biopsy, and endometrial biopsy       Anesthesia Type:  General    Note:     Postop Pain Control: Uneventful            Sign Out: Well controlled pain   PONV: No   Neuro/Psych: Uneventful            Sign Out: Acceptable/Baseline neuro status   Airway/Respiratory: Uneventful            Sign Out: Acceptable/Baseline resp. status   CV/Hemodynamics: Uneventful            Sign Out: Acceptable CV status; No obvious hypovolemia; No obvious fluid overload   Other NRE: NONE   DID A NON-ROUTINE EVENT OCCUR? No           Last vitals:  Vitals Value Taken Time   /78 09/28/22 1315   Temp 97.4  F (36.3  C) 09/28/22 1300   Pulse 59 09/28/22 1321   Resp 15 09/28/22 1321   SpO2 100 % 09/28/22 1321   Vitals shown include unvalidated device data.    Electronically Signed By: Joey Julian DO  September 28, 2022  1:44 PM

## 2022-09-28 NOTE — ANESTHESIA PROCEDURE NOTES
Airway       Patient location during procedure: OR       Procedure Start/Stop Times: 9/28/2022 10:55 AM  Staff -        CRNA: Meir Draper APRN CRNA       Performed By: CRNA  Consent for Airway        Urgency: elective  Indications and Patient Condition       Indications for airway management: ajit-procedural       Induction type:intravenous       Mask difficulty assessment: 1 - vent by mask    Final Airway Details       Final airway type: endotracheal airway       Successful airway: Oral and ETT - single  Endotracheal Airway Details        ETT size (mm): 7.0       Cuffed: yes       Cuff volume (mL): 7       Successful intubation technique: direct laryngoscopy       DL Blade Type: Alfonso 2       Grade View of Cords: 1       Adjucts: stylet       Position: Right       Measured from: lips       Secured at (cm): 21       Bite block used: Soft    Post intubation assessment        Placement verified by: capnometry, equal breath sounds and chest rise        Number of attempts at approach: 1       Number of other approaches attempted: 0       Secured with: plastic tape       Ease of procedure: easy       Dentition: Intact and Unchanged    Medication(s) Administered   Medication Administration Time: 9/28/2022 10:55 AM

## 2022-09-28 NOTE — ANESTHESIA CARE TRANSFER NOTE
Patient: Sammie Ramirez    Procedure: Procedure(s):  Xi Robotic  endometriosis resection/fulguration, cervical biopsy, and endometrial biopsy       Diagnosis: Pelvic pain in female [R10.2]  Right ovarian cyst [N83.201]  Diagnosis Additional Information: No value filed.    Anesthesia Type:   General     Note:    Oropharynx: oropharynx clear of all foreign objects and spontaneously breathing  Level of Consciousness: drowsy  Oxygen Supplementation: face mask  Level of Supplemental Oxygen (L/min / FiO2): 8  Independent Airway: airway patency satisfactory and stable  Dentition: dentition unchanged  Vital Signs Stable: post-procedure vital signs reviewed and stable  Report to RN Given: handoff report given  Patient transferred to: PACU    Handoff Report: Identifed the Patient, Identified the Reponsible Provider, Reviewed the pertinent medical history, Discussed the surgical course, Reviewed Intra-OP anesthesia mangement and issues during anesthesia, Set expectations for post-procedure period and Allowed opportunity for questions and acknowledgement of understanding      Vitals:  Vitals Value Taken Time   /89 09/28/22 1209   Temp     Pulse 85 09/28/22 1212   Resp 16 09/28/22 1212   SpO2 100 % 09/28/22 1212   Vitals shown include unvalidated device data.    Electronically Signed By: JORGE Person CRNA  September 28, 2022  12:14 PM

## 2022-10-03 ENCOUNTER — TELEPHONE (OUTPATIENT)
Dept: OBGYN | Facility: CLINIC | Age: 47
End: 2022-10-03

## 2022-10-03 DIAGNOSIS — L29.9 ITCHING: ICD-10-CM

## 2022-10-03 DIAGNOSIS — G89.18 ACUTE POST-OPERATIVE PAIN: Primary | ICD-10-CM

## 2022-10-03 LAB
PATH REPORT.COMMENTS IMP SPEC: NORMAL
PATH REPORT.FINAL DX SPEC: NORMAL
PATH REPORT.GROSS SPEC: NORMAL
PATH REPORT.MICROSCOPIC SPEC OTHER STN: NORMAL
PATH REPORT.MICROSCOPIC SPEC OTHER STN: NORMAL
PATH REPORT.RELEVANT HX SPEC: NORMAL
PHOTO IMAGE: NORMAL

## 2022-10-03 PROCEDURE — 88342 IMHCHEM/IMCYTCHM 1ST ANTB: CPT | Mod: 26 | Performed by: PATHOLOGY

## 2022-10-03 PROCEDURE — 88341 IMHCHEM/IMCYTCHM EA ADD ANTB: CPT | Mod: 26 | Performed by: PATHOLOGY

## 2022-10-03 PROCEDURE — 88305 TISSUE EXAM BY PATHOLOGIST: CPT | Mod: 26 | Performed by: PATHOLOGY

## 2022-10-03 RX ORDER — HYDROMORPHONE HYDROCHLORIDE 2 MG/1
2 TABLET ORAL EVERY 6 HOURS PRN
Qty: 10 TABLET | Refills: 0 | Status: SHIPPED | OUTPATIENT
Start: 2022-10-03 | End: 2022-10-06

## 2022-10-03 RX ORDER — HYDROXYZINE PAMOATE 50 MG/1
CAPSULE ORAL
Qty: 12 CAPSULE | Refills: 0 | Status: SHIPPED | OUTPATIENT
Start: 2022-10-03 | End: 2023-05-22

## 2022-10-03 NOTE — TELEPHONE ENCOUNTER
Spoke with pt.   She is taking 3 tabs tylenol every 4-6 hrs.  She is also taking Mobic once daily.  She was advised not to use ibuprofen.   The has been taking the hydromorphone only at night as it causes itchiness.  She does not tolerate oxycodone as it causes nausea and itchiness.  I did recommend alternating heat/ice to her shoulder and back.    Please advise if there is any other pain relief options.     Pt states that she is not having incisional pain.  Sakshi LANTIGUA RN

## 2022-10-03 NOTE — TELEPHONE ENCOUNTER
Gave pt all info as listed below.     Requests refill of hydromorphone (was given qty 18 on 9/28/22) has 4 left.  I also sent over the vistaril.        Marysol ORDAZ RN BSN

## 2022-10-03 NOTE — TELEPHONE ENCOUNTER
She needs to make sure she is not taking more than 4g of tylenol per day (no more than 4 doses fo 975mg daily).  She can take up to 10mg of the mobic daily. If her prescription is for 5mg tabs, she can add a second tab. (This is an NSAID, which is why she is not taking ibuprofen).    She should use the hydromorphone in the daytime and take with 1 tab of benadryl for the itching. Her pain is poorly controlled because she isn't taking enough meds to control it. She will likely be drowsy with this combination but its ok to sleep during recovery. Do not operate a vehicle while taking hydromorphone. She is already on the 3 types of medication that we typically use post-op and if taken as directed, will help with her pain.    As an alternative to benadry, sometimes vistaril helps a bit more with pain and helps with the itching as well. Ok to place orders for vistaril 50mg every 6 hours as needed, #12 tabs. Do not take simultaneously with benadryl. But ok to take with the hydromorphone.     If ongoing pain with the new regimen (taking hydromorphone in the day time), return to clinic tomorrow for evaluation and to discuss gabapentin or alternative post-op meds with Dr. Sen.    Carlie Abraham MD

## 2022-10-03 NOTE — TELEPHONE ENCOUNTER
Please call patient and find out her exact regimen (dosing and frequency) of all pain meds she is taking, including hydromorphone, meloxicam, and tylenol. If pain is incisional, I can add lidocaine patches.     If she is not making out her doses and frequency as prescribed, please do that, and do not skip doses even if she thinks its not helping enough. I can potentially increase her hydromorphone or switch to oxycodone. She has nausea and vomiting listed as allergies to oxycodone, these are SE rather than true allergies and I'm happy to add zofran 4mg OTD every 8 hours as needed (#8 tabs) if she would like to try oxycodone if she feels it provides better pain control for her.     If bloating style pain, add simethicone. She should use a heating pad on back and shoulder and try alternating heat and ice to shoulder for referred pain.     Carlie Abraham MD

## 2022-10-03 NOTE — TELEPHONE ENCOUNTER
Pt calls with .    She had surgery on 9/28/22 with Dr Sen (she is not in today)    Complains of back pain, right shoulder pain. Discussed referred shoulder pain. Also having abdominal pain. Pt frustrated. Advised to slowly move around to try and get relief, push fluids.      She is using tylenol (was told not to take ibuprofen) and hydromorphone. Using Mobic as well. Not having much relief. Asks for a different pain medication to be sent.    Marysol ORDAZ RN BSN

## 2022-10-05 RX ORDER — HYDROMORPHONE HYDROCHLORIDE 2 MG/1
2 TABLET ORAL EVERY 6 HOURS PRN
Qty: 5 TABLET | Refills: 0 | Status: SHIPPED | OUTPATIENT
Start: 2022-10-05 | End: 2022-10-08

## 2022-10-05 NOTE — TELEPHONE ENCOUNTER
Hi, refill granted for 5 additional pain pills dilaudid   Please advise   Dr. Krystin Sen, DO    Obstetrics and Gynecology  [facility] and [place]

## 2022-10-12 ENCOUNTER — OFFICE VISIT (OUTPATIENT)
Dept: OBGYN | Facility: CLINIC | Age: 47
End: 2022-10-12
Payer: MEDICARE

## 2022-10-12 VITALS
DIASTOLIC BLOOD PRESSURE: 70 MMHG | BODY MASS INDEX: 22.45 KG/M2 | WEIGHT: 131.5 LBS | HEIGHT: 64 IN | SYSTOLIC BLOOD PRESSURE: 128 MMHG

## 2022-10-12 DIAGNOSIS — Z98.890 S/P LAPAROSCOPY: Primary | ICD-10-CM

## 2022-10-12 PROCEDURE — 99024 POSTOP FOLLOW-UP VISIT: CPT | Performed by: FAMILY MEDICINE

## 2022-10-12 NOTE — PROGRESS NOTES
"Subjective: 46 year old female status post Robotic assisted laparoscopic endometriosis resection/fulguration,   cervical biopsy, and endometrial biopsy on 09/28/2022, here for incision check.    Doing well, denies fever, significant pain.    Is not taking pain medications.   States some light vaginal bleeding.      Objective:  EXAM:  /70   Ht 1.626 m (5' 4\")   Wt 59.6 kg (131 lb 8 oz)   LMP 09/03/2022 (Approximate)   BMI 22.57 kg/m       Constitutional: healthy, alert and no distress  Gastrointestinal: Abdomen soft, non-tender. Incision intact, no erthema, drainage.    A.  Cervix at 6:00, biopsy:  - Benign endocervix with moderate chronic inflammation.  - A small amount of metaplastic squamous epithelium with reactive change and mild acute inflammation.     B.  Cervix at 12:00, biopsy:  - Benign endocervix with mild chronic inflammation.  - Normal mature squamous epithelium.     C.  Endometrium, biopsy:  - Multiple fragments of benign secretory endometrium, negative for hyperplasia, atypia and malignancy.      D.  Posterior cul-de-sac, biopsy:  - Benign fibrofatty tissue with focal usual endometriosis.     E.  Left pelvic sidewall, biopsy:  - Benign fibrofatty and smooth muscle tissue with focal usual endometriosis and micronodular stromal endometriosis.     F.  Right pelvic sidewall, biopsy:  - Benign fibrofatty and smooth muscle tissue with foci of micronodular stromal endometriosis.     G.  Anterior peritoneum, biopsy:  - Benign fibrous tissue with focal micronodular stromal endometriosis.    Assessment/Plan: 46 year old female status post Robotic assisted laparoscopic endometriosis resection/fulguration, cervical biopsy, and endometrial biopsy on 09/28/2022, here for incision check.    V67.00C Surgery Follow-Up Examination  (primary encounter diagnosis)  Comment:      Plan:   HPV positive pap with normal cervical biopsy: repeat pap smear in 1 year.   Endometriosis surgically treated:  She feels better, " discussed control of endometriosis with   Oral Contraceptive, IUD, depo, nuvaring, nexplanon, norethindrone, or Lupron/Orilissa   She plans to see how things go and evaluate this if the endometriosis and pelvic pain from this returns.   Return as needed.     Dr. Krystin Sen, DO    Obstetrics and Gynecology  [facility] and [place]

## 2022-10-12 NOTE — NURSING NOTE
"Chief Complaint   Patient presents with     Surgical Followup     Right side is tender       Initial /70   Ht 1.626 m (5' 4\")   Wt 59.6 kg (131 lb 8 oz)   LMP 2022 (Approximate)   BMI 22.57 kg/m   Estimated body mass index is 22.57 kg/m  as calculated from the following:    Height as of this encounter: 1.626 m (5' 4\").    Weight as of this encounter: 59.6 kg (131 lb 8 oz).  BP completed using cuff size: regular    Questioned patient about current smoking habits.  Pt. has never smoked.          The following HM Due: NONE             "

## 2022-10-12 NOTE — PATIENT INSTRUCTIONS
Return yearly for exams     Dr. Krystin Sen, DO    Obstetrics and Gynecology  [facility] and [place]

## 2022-10-27 ENCOUNTER — PATIENT OUTREACH (OUTPATIENT)
Dept: OBGYN | Facility: CLINIC | Age: 47
End: 2022-10-27

## 2022-11-21 NOTE — PROGRESS NOTES
"ASSESSMENT & PLAN  Patient Instructions     1. Lateral epicondylitis of right elbow    2. Elbow strain, left, initial encounter      -Patient has acute on chronic lateral elbow pain after lifting causing a pop due to possible extensor tendon tear  -We discussed treatment options including physical therapy versus advanced imaging.  Patient did not feel that she will be able to tolerate therapy at this time due to her severe pain.  Patient was inquiring about potential cortisone injection.  I advised the patient to wait until after an MRI to rule out significant tearing which can worsen with a cortisone injection.  - Your MRI has been ordered. You may call [phone] to schedule over the phone. Please call my office to schedule follow-up telephone visit 2 days after your MRI is complete to discuss results and next of treatment options.  -Call direct clinic number [[phone]] at any time with questions or concerns.    Albert Yeo MD [facility] and [facility] Medicine  [facility]          -----    SUBJECTIVE:  Sammie Ramirez is a 46 year old female who is seen in follow-up for right elbow.They were last seen.  Patient states since last office visit, 6-8 weeks ago, she picked something up and felt a painful \"pop\" or a \"pull\" in her elbow. States her pain is \"different\" compared to last office visit. Notes \"very sharp\" pain localized on lateral epicondyle. Painful to the touch. Some swelling, bruising/discoloration in the area.   More painful with grasping and lifting objects.     Since their last visit reports worsening pain due to new injury. They indicate that their current pain level is 9/10. They have tried corticosteroid injection (most recent date: 3/23/2022) that provided  6-7 month(s) of relief (was having relief until above noted injury) and bracing. Takes Tylenol ES     The patient is seen with ASP  Sheryl.    Patient's past medical, surgical, social, and family " "histories were reviewed today and no changes are noted.    REVIEW OF SYSTEMS:  Constitutional: NEGATIVE for fever, chills, change in weight  Skin: NEGATIVE for worrisome rashes, moles or lesions  GI/: NEGATIVE for bowel or bladder changes  Neuro: NEGATIVE for weakness, dizziness or paresthesias    OBJECTIVE:  /64   Ht 1.626 m (5' 4\")   Wt 59.4 kg (131 lb)   BMI 22.49 kg/m     General: healthy, alert and in no distress  HEENT: no scleral icterus or conjunctival erythema  Skin: no suspicious lesions or rash. No jaundice.  CV: regular rhythm by palpation, no pedal edema  Resp: normal respiratory effort without conversational dyspnea   Psych: normal mood and affect  Gait: normal steady gait with appropriate coordination and balance  Neuro: normal light touch sensory exam of the extremities.    MSK:  RIGHT ELBOW  Inspection:    No swelling, bruising, discoloration, or obvious deformity or asymmetry  Palpation:    Tender about the lateral epicondyle, common extensor tendon, extensor muscle of forearm. Remainder of bony, ligamentous and tendinous landmarks are nontender.    Crepitus is AbsentfullRange of Motion:     Extension full / flexion full / pronation full / supination full  Strength:    Extension, pronation and supination limited by pain  Special Tests:    Positive: Pain with resisted wrist extension, pain with resisted middle finger extension, pain with resisted supination, pain with resisted pronation    Negative: pain with resisted wrist flexion       Independent visualization of the below image:      Albert Yeo MD, [facility] Orthopedic [facility]        "

## 2022-11-23 ENCOUNTER — OFFICE VISIT (OUTPATIENT)
Dept: ORTHOPEDICS | Facility: CLINIC | Age: 47
End: 2022-11-23
Payer: MEDICARE

## 2022-11-23 VITALS
BODY MASS INDEX: 22.36 KG/M2 | WEIGHT: 131 LBS | SYSTOLIC BLOOD PRESSURE: 122 MMHG | HEIGHT: 64 IN | DIASTOLIC BLOOD PRESSURE: 64 MMHG

## 2022-11-23 DIAGNOSIS — M77.11 LATERAL EPICONDYLITIS OF RIGHT ELBOW: Primary | ICD-10-CM

## 2022-11-23 DIAGNOSIS — S56.912A ELBOW STRAIN, LEFT, INITIAL ENCOUNTER: ICD-10-CM

## 2022-11-23 PROCEDURE — 99213 OFFICE O/P EST LOW 20 MIN: CPT | Performed by: FAMILY MEDICINE

## 2022-11-23 NOTE — PATIENT INSTRUCTIONS
1. Lateral epicondylitis of right elbow    2. Elbow strain, left, initial encounter      -Patient has acute on chronic lateral elbow pain after lifting causing a pop due to possible extensor tendon tear  -We discussed treatment options including physical therapy versus advanced imaging.  Patient did not feel that she will be able to tolerate therapy at this time due to her severe pain.  Patient was inquiring about potential cortisone injection.  I advised the patient to wait until after an MRI to rule out significant tearing which can worsen with a cortisone injection.  - Your MRI has been ordered. You may call [phone] to schedule over the phone. Please call my office to schedule follow-up telephone visit 2 days after your MRI is complete to discuss results and next of treatment options.  -Call direct clinic number [[phone]] at any time with questions or concerns.    Albert Yeo MD [facility]  [facility] [place]

## 2022-11-23 NOTE — LETTER
"    11/23/2022         RE: Sammie Ramirez  [address] [address]        Dear Colleague,    Thank you for referring your patient, Sammie Ramirez, to the [facility] [place]. Please see a copy of my visit note below.    ASSESSMENT & PLAN  Patient Instructions     1. Lateral epicondylitis of right elbow    2. Elbow strain, left, initial encounter      -Patient has acute on chronic lateral elbow pain after lifting causing a pop due to possible extensor tendon tear  -We discussed treatment options including physical therapy versus advanced imaging.  Patient did not feel that she will be able to tolerate therapy at this time due to her severe pain.  Patient was inquiring about potential cortisone injection.  I advised the patient to wait until after an MRI to rule out significant tearing which can worsen with a cortisone injection.  - Your MRI has been ordered. You may call [phone] to schedule over the phone. Please call my office to schedule follow-up telephone visit 2 days after your MRI is complete to discuss results and next of treatment options.  -Call direct clinic number [[phone]] at any time with questions or concerns.    Albert Yeo MD [facility] Orthopedics and Sports Medicine  [facility] [place]          -----    SUBJECTIVE:  Sammie Ramirez is a 46 year old female who is seen in follow-up for right elbow.They were last seen.  Patient states since last office visit, 6-8 weeks ago, she picked something up and felt a painful \"pop\" or a \"pull\" in her elbow. States her pain is \"different\" compared to last office visit. Notes \"very sharp\" pain localized on lateral epicondyle. Painful to the touch. Some swelling, bruising/discoloration in the area.   More painful with grasping and lifting objects.     Since their last visit reports worsening pain due to new injury. They indicate that their current pain level is 9/10. They have tried corticosteroid " "injection (most recent date: 3/23/2022) that provided  6-7 month(s) of relief (was having relief until above noted injury) and bracing. Takes Tylenol ES     The patient is seen with ASP  Sheryl.    Patient's past medical, surgical, social, and family histories were reviewed today and no changes are noted.    REVIEW OF SYSTEMS:  Constitutional: NEGATIVE for fever, chills, change in weight  Skin: NEGATIVE for worrisome rashes, moles or lesions  GI/: NEGATIVE for bowel or bladder changes  Neuro: NEGATIVE for weakness, dizziness or paresthesias    OBJECTIVE:  /64   Ht 1.626 m (5' 4\")   Wt 59.4 kg (131 lb)   BMI 22.49 kg/m     General: healthy, alert and in no distress  HEENT: no scleral icterus or conjunctival erythema  Skin: no suspicious lesions or rash. No jaundice.  CV: regular rhythm by palpation, no pedal edema  Resp: normal respiratory effort without conversational dyspnea   Psych: normal mood and affect  Gait: normal steady gait with appropriate coordination and balance  Neuro: normal light touch sensory exam of the extremities.    MSK:  RIGHT ELBOW  Inspection:    No swelling, bruising, discoloration, or obvious deformity or asymmetry  Palpation:    Tender about the lateral epicondyle, common extensor tendon, extensor muscle of forearm. Remainder of bony, ligamentous and tendinous landmarks are nontender.    Crepitus is AbsentfullRange of Motion:     Extension full / flexion full / pronation full / supination full  Strength:    Extension, pronation and supination limited by pain  Special Tests:    Positive: Pain with resisted wrist extension, pain with resisted middle finger extension, pain with resisted supination, pain with resisted pronation    Negative: pain with resisted wrist flexion       Independent visualization of the below image:      Albert Yeo MD, [facility]            Again, thank you for allowing me to participate in the care of your patient.  "       Sincerely,        Albert Yeo, MD

## 2022-12-08 ENCOUNTER — HOSPITAL ENCOUNTER (OUTPATIENT)
Dept: MRI IMAGING | Facility: CLINIC | Age: 47
Discharge: HOME | End: 2022-12-08
Attending: FAMILY MEDICINE | Admitting: FAMILY MEDICINE
Payer: MEDICARE

## 2022-12-08 DIAGNOSIS — S56.912A ELBOW STRAIN, LEFT, INITIAL ENCOUNTER: ICD-10-CM

## 2022-12-08 DIAGNOSIS — M77.11 LATERAL EPICONDYLITIS OF RIGHT ELBOW: ICD-10-CM

## 2022-12-08 PROCEDURE — G1010 CDSM STANSON: HCPCS

## 2022-12-08 PROCEDURE — G1010 CDSM STANSON: HCPCS | Performed by: RADIOLOGY

## 2022-12-08 PROCEDURE — T1013 SIGN LANG/ORAL INTERPRETER: HCPCS | Mod: U3

## 2022-12-08 PROCEDURE — 73221 MRI JOINT UPR EXTREM W/O DYE: CPT | Mod: 26 | Performed by: RADIOLOGY

## 2022-12-09 ENCOUNTER — TELEPHONE (OUTPATIENT)
Dept: ORTHOPEDICS | Facility: CLINIC | Age: 47
End: 2022-12-09

## 2022-12-09 ENCOUNTER — TELEPHONE (OUTPATIENT)
Dept: NURSING | Facility: CLINIC | Age: 47
End: 2022-12-09

## 2022-12-09 DIAGNOSIS — S56.519A PARTIAL TEAR OF COMMON EXTENSOR TENDON OF ELBOW: ICD-10-CM

## 2022-12-09 DIAGNOSIS — M77.11 LATERAL EPICONDYLITIS OF RIGHT ELBOW: Primary | ICD-10-CM

## 2022-12-09 RX ORDER — TRAMADOL HYDROCHLORIDE 50 MG/1
50 TABLET ORAL EVERY 6 HOURS PRN
Qty: 10 TABLET | Refills: 0 | Status: SHIPPED | OUTPATIENT
Start: 2022-12-09 | End: 2022-12-12

## 2022-12-09 NOTE — TELEPHONE ENCOUNTER
Discussed with Dr. Yeo who reviewed her MRI showing a Partial tear of common extensor tendon of elbow . He placed a referral for Dr. Milligan and sent a small prescription for Tramadol to the pharmacy. Otherwise, patient to use Tylenol, Ibuprofen, ice, elevation.     Left voicemail for patient asking for a return call.     MALLIKA Espinoza RN

## 2022-12-09 NOTE — TELEPHONE ENCOUNTER
"Triage Call:    Caller: Patient    Patient is calling with  with questions about why the Tramadol was prescribed \"by the nurse\", as the pharmacist told her it contained codine, which is on her allergy list.  Advised that provider reviewed situation and chart and prescribed the medication.    She is very frustrated and tired of feeling ill.      Reinforced information in below note in regards to caring for elbow pain.    Patient stopped taking the tylenol and ibuprofen and using the ice is not working anymore \"after 3 months\".    She took sumatriptin to reduce headache and nausea and is frustrated she has had a migraine for 3 days now.  She usually only has them for 1 day.    Patient keeps asking same questions that have been reinforced.  Patient asked what she should do if still having the symptoms tomorrow.  Advised to call to triage line.      Winnie Suh RN on 12/9/2022 at 5:09 PM                        "

## 2022-12-09 NOTE — TELEPHONE ENCOUNTER
"Patient called the  5-6 6 times and they told her a nurse would call her back.     Phone call to patient and apologized that writer was not available when she called. Started to explain MRI results and recommendations. Patient repeatedly interrupts and wants to know if she should go to the ER. She was informed of recommendations below. She was advised it is up to her if she would like to go to the ED or not, but that Dr. Yeo did send an prescription for pain medication to her pharmacy to be used sparingly. She may continue use of Tylenol and ibuprofen. She states she did not ask for pain medication and wants to know if she should go to the ED. She reports continuing to have a migraine, and nausea due to severe elbow pain and \"does not feel well.\". Discussed that the ED may not be able to do any more for her than Dr. Yeo has already done. She states she is very grumpy and in pain and just wants to know if she can go to the ED. She was advised she may. Also assisted her to set up an appointment with Dr. Milligan on 12/13/22 at 3 pm in [place] with 2:45pm checkin. Address provided. She verbalized understanding and was appreciative of assistance.     MALLIKA Espinoza RN    "

## 2022-12-09 NOTE — TELEPHONE ENCOUNTER
Patient calls with assist of an [facility] . She states she has been having increased pain of her right elbow the past week. She denies a new injury.   She had an MRI yesterday. She ended up with a migraine after the MRI yesterday and has been nauseated and having severe pain of +9/10. Tylenol did not help with the pain and Ibuprofen hasn't helped much. She asks if she should go to the ED or what is recommended. She has a follow up appointment scheduled with Dr. Yeo on 12/14/22. She is at work now and will be done working at 12 noon. She continues to not feel well due to the pain and nausea.     Will discuss with provider and get back with her. Otherwise, recommended if her pain is not being managed, to go to the ED. She states she prefers to hear back of what Dr. Yeo's recommendations are.     Ok to leave message : YES                                                                     Impression:  1. On a background of tendinosis, near-complete tear of the common  extensor tendon at the proximal humeral attachment with small residual  strand of intact fibers.  2. Full-thickness tear anterior and middle fibers of radial collateral  ligament at the proximal attachment with markedly thickened posterior  fibers.  3. Moderate joint effusion.     Please advise.     MALLIKA Espinoza RN

## 2022-12-09 NOTE — TELEPHONE ENCOUNTER
Received voicemail at 9:09 from patient asking for a return call to : [phone] today.  No other details left.     MALLIKA Espinoza RN          Patient calls with assist of an ALS . She states she has been having increased pain of her right elbow the past week. She denies a new injury.   She had an MRI yesterday. She ended up with a migraine after the MRI yesterday and has been nauseated and having severe pain of +9/10. Tylenol did not help with the pain and Ibuprofen hasn't helped much. She asks if she should go to the ED or what is recommended. She has a follow up appointment scheduled with Dr. Yeo on 12/14/22. She is at work now and will be done working at 12 noon. She continues to not feel well due to the pain and nausea.     Will discuss with provider and get back with her. Otherwise, recommended if her pain is not being managed, to go to the ED. She states she prefers to hear back of what Dr. Yeo's recommendations are.     Ok to leave message : YES                                                                     Impression:  1. On a background of tendinosis, near-complete tear of the common  extensor tendon at the proximal humeral attachment with small residual  strand of intact fibers.  2. Full-thickness tear anterior and middle fibers of radial collateral  ligament at the proximal attachment with markedly thickened posterior  fibers.  3. Moderate joint effusion.     Please advise.     MALLIKA Espinoza RN

## 2022-12-13 ENCOUNTER — OFFICE VISIT (OUTPATIENT)
Dept: ORTHOPEDICS | Facility: CLINIC | Age: 47
End: 2022-12-13
Payer: MEDICARE

## 2022-12-13 VITALS
BODY MASS INDEX: 22.36 KG/M2 | SYSTOLIC BLOOD PRESSURE: 140 MMHG | HEIGHT: 64 IN | DIASTOLIC BLOOD PRESSURE: 79 MMHG | WEIGHT: 131 LBS

## 2022-12-13 DIAGNOSIS — M77.11 LATERAL EPICONDYLITIS OF RIGHT ELBOW: Primary | ICD-10-CM

## 2022-12-13 PROCEDURE — 99204 OFFICE O/P NEW MOD 45 MIN: CPT | Performed by: STUDENT IN AN ORGANIZED HEALTH CARE EDUCATION/TRAINING PROGRAM

## 2022-12-13 NOTE — LETTER
12/13/2022         RE: Sammie Ramirez  [address] [address]        Dear Colleague,    Thank you for referring your patient, Sammie Ramirez, to the [facility] [place]. Please see a copy of my visit note below.    Orthopaedic Surgery Hand and Upper Extremity Clinic H&P NOTE:  Date: Dec 13, 2022    Patient Name: Sammie Ramirez  MRN: [id number]    CHIEF COMPLAINT: right elbow pain    Dominant Hand: right  Occupation: PCA, house cleaning      HPI:  Ms. Sammie Ramirez is a 47 year old female right hand dominant who presents for evaluation of right elbow pain. Patient has  present at today's visit. Referred by Dr. Yeo for a consult.    Patient had right elbow injection for lateral epicondylitis on 3/23/22 with Dr. Yeo. She did fine for almost 9 months but sustained another injury mid October when she went to pick something up and felt a painful pop in the lateral elbow. Pain is quite severe, aggravated by motion, use, job duties, grasping, lifting. She is heavily right hand dominant and relies on right arm, wrist, and hand for many tasks including signing. Reports point tenderness directly on the lateral epicondyle. No numbness/tingling.    Has not had any therapy or immobilization. No recent injections. Took tramadol but this made her feel bad so has stopped.      PAST MEDICAL HISTORY:  Past Medical History:   Diagnosis Date     ASCUS (atypical squamous cells of undetermined significance) on Pap smear 10/13/2016    Neg HPV     History of colposcopy 06/14/2018 5/24/21     LSIL (low grade squamous intraepithelial lesion) on Pap smear 02/01/2014       PAST SURGICAL HISTORY:  Past Surgical History:   Procedure Laterality Date     DAVINCI PELVIC PROCEDURE Right 9/28/2022    Procedure: Xi Robotic  endometriosis resection/fulguration, cervical biopsy, and endometrial biopsy;  Surgeon: Krystin Sen DO;  Location: [facility] OR     LEEP TX, CERVICAL  x 2 per chart     historical     ORTHOPEDIC SURGERY  2005       MEDICATIONS:  Current Outpatient Medications   Medication     acetaminophen (TYLENOL) 325 MG tablet     hydrocortisone, Perianal, (ANUSOL-HC) 2.5 % cream     HYDROmorphone (DILAUDID) 2 MG tablet     hydrOXYzine (VISTARIL) 50 MG capsule     levocetirizine (XYZAL) 5 MG tablet     meloxicam (MOBIC) 15 MG tablet     mometasone (NASONEX) 50 MCG/ACT nasal spray     montelukast (SINGULAIR) 10 MG tablet     naloxone (NARCAN) 4 MG/0.1ML nasal spray     omeprazole (PRILOSEC) 40 MG DR capsule     ondansetron (ZOFRAN ODT) 4 MG ODT tab     ondansetron (ZOFRAN) 8 MG tablet     polyethylene glycol (MIRALAX) 17 GM/Dose powder     rizatriptan (MAXALT) 10 MG tablet     senna-docusate (SENOKOT-S/PERICOLACE) 8.6-50 MG tablet     SUMAtriptan (IMITREX) 100 MG tablet     triamcinolone (KENALOG) 0.1 % external cream     No current facility-administered medications for this visit.       ALLERGIES:     Allergies   Allergen Reactions     Amoxicillin      Rash, hives     Codeine Sulfate Nausea and Vomiting     Hydrocodone Nausea and Vomiting     Lactose Diarrhea     dairy     Seasonal Allergies        FAMILY HISTORY:  No pertinent family history    SOCIAL HISTORY:  Social History     Tobacco Use     Smoking status: Never     Smokeless tobacco: Never   Vaping Use     Vaping Use: Never used   Substance Use Topics     Alcohol use: Yes     Comment: wine 1-2 glasses on weekends     Drug use: No         The patient's past medical, family, and social history was reviewed and confirmed.    REVIEW OF SYMPTOMS:      General: Negative   Eyes: Negative   Ear, Nose and Throat: Negative   Respiratory: Negative   Cardiovascular: Negative   Gastrointestinal: Negative   Genito-urinary: Negative   Musculoskeletal: Negative  Neurological: Negative   Psychological: Negative  HEME: Negative   ENDO: Negative   SKIN: Negative    VITALS:  There were no vitals filed for this visit.    EXAM:  General: NAD, A&Ox3  HEENT:  NC/AT  CV: RRR by peripheral pulse  Pulmonary: Non-labored breathing on RA  RUE:  Mild bruising/swelling directly over the lateral epicondyle where it is maximally tender  Full elbow flexion/extension, pronation, supination  There is no instability for varus stress. Negative pivot shift. Negative push off test  +TTP over lateral epicondyle with resisted MF extension and wrist extension, this does not improve with counterforce applied to common extensor muscle belly  5/5 EPL/FPL/HI  SILT m/r/u  WWP CR< 2s      IMAGING:  MRI of the R elbow 12/8/22 demonstrates severe tendinosis of the common extensor tendon near the origin which has sustained a partial tear off the lateral epicondyle. Some small fibers remain intact. Partial tearing of the LUCL, MRI reports tearing of the radial collateral ligament as well but this is not well appreciated    XR R elbow 3/23/22 normal    I have personally reviewed the above images and labs.         IMPRESSION AND RECOMMENDATIONS:  Ms. Sammie Ramirez is a 47 year old female right hand dominant with severe right lateral epicondylitis and partial tearing of the common extensor tendon off lateral epicondyle.    I had a lengthy discussion with the patient regarding lateral epicondylitis.  I discussed that this is tendinosis and a degenerative condition, as opposed to tendonitis which is inflammatory. The quality of the tendon is poor and difficult to successfully repair or reattach and prone to re-tear. Surgery for this condition is limited to debridement and in severe cases, the ECRB can be released from the lateral epicondyle, which in effect is what has already happened to her.     While the MRI read describes tearing of the radial collateral ligament and lateral ulnar collateral ligament, she has no evidence of instability with negative pivot shift and negative push off. It would be highly unlikely for these stout ligaments to be torn in this condition.    The patient voiced frustration  due to the pain. Unfortunately I discussed that surgery is not a magic bullet and that results could not be guaranteed. Most patients respond with time, immobilization, and hand therapy for stretching and strengthening, but this can take a year or more.    I recommended she see hand therapy to obtain a custom thermoplastic long arm splint to immobilize the elbow and wrist. I think this will help significantly particularly at night to support the elbow. I recommended she start taking 2 tablets of Aleve (naproxen) and 2 tablets of Tylenol Arthritis strength at night before bed for longer acting pain relief.    She should wear the splint at all times except for hygiene for the next 6 weeks. After 6 weeks, should start hand therapy for stretching for lateral epicondylitis.    Follow-up with me in 3 months. If she has no improvement, we can consider debridement.    Patient voiced understanding and agreement, all questions answered.    Mahesh Milligan MD    Hand, Upper Extremity & Microvascular Surgery  Department of Orthopaedic Surgery  [facility]        Again, thank you for allowing me to participate in the care of your patient.        Sincerely,        Mahesh Milligan MD

## 2022-12-13 NOTE — PROGRESS NOTES
Orthopaedic Surgery Hand and Upper Extremity Clinic H&P NOTE:  Date: Dec 13, 2022    Patient Name: Sammie Ramirez  MRN: [id number]    CHIEF COMPLAINT: right elbow pain    Dominant Hand: right  Occupation: PCA, house cleaning      HPI:  Ms. Sammie Ramirez is a 47 year old female right hand dominant who presents for evaluation of right elbow pain. Patient has  present at today's visit. Referred by Dr. Yeo for a consult.    Patient had right elbow injection for lateral epicondylitis on 3/23/22 with Dr. Yeo. She did fine for almost 9 months but sustained another injury mid October when she went to pick something up and felt a painful pop in the lateral elbow. Pain is quite severe, aggravated by motion, use, job duties, grasping, lifting. She is heavily right hand dominant and relies on right arm, wrist, and hand for many tasks including signing. Reports point tenderness directly on the lateral epicondyle. No numbness/tingling.    Has not had any therapy or immobilization. No recent injections. Took tramadol but this made her feel bad so has stopped.      PAST MEDICAL HISTORY:  Past Medical History:   Diagnosis Date     ASCUS (atypical squamous cells of undetermined significance) on Pap smear 10/13/2016    Neg HPV     History of colposcopy 06/14/2018 5/24/21     LSIL (low grade squamous intraepithelial lesion) on Pap smear 02/01/2014       PAST SURGICAL HISTORY:  Past Surgical History:   Procedure Laterality Date     DAVINCI PELVIC PROCEDURE Right 9/28/2022    Procedure: Xi Robotic  endometriosis resection/fulguration, cervical biopsy, and endometrial biopsy;  Surgeon: Krystin Sen DO;  Location: [facility] OR     LEEP TX, CERVICAL  x 2 per chart    historical     ORTHOPEDIC SURGERY  2005       MEDICATIONS:  Current Outpatient Medications   Medication     acetaminophen (TYLENOL) 325 MG tablet     hydrocortisone, Perianal, (ANUSOL-HC) 2.5 % cream     HYDROmorphone (DILAUDID) 2 MG tablet     hydrOXYzine  (VISTARIL) 50 MG capsule     levocetirizine (XYZAL) 5 MG tablet     meloxicam (MOBIC) 15 MG tablet     mometasone (NASONEX) 50 MCG/ACT nasal spray     montelukast (SINGULAIR) 10 MG tablet     naloxone (NARCAN) 4 MG/0.1ML nasal spray     omeprazole (PRILOSEC) 40 MG DR capsule     ondansetron (ZOFRAN ODT) 4 MG ODT tab     ondansetron (ZOFRAN) 8 MG tablet     polyethylene glycol (MIRALAX) 17 GM/Dose powder     rizatriptan (MAXALT) 10 MG tablet     senna-docusate (SENOKOT-S/PERICOLACE) 8.6-50 MG tablet     SUMAtriptan (IMITREX) 100 MG tablet     triamcinolone (KENALOG) 0.1 % external cream     No current facility-administered medications for this visit.       ALLERGIES:     Allergies   Allergen Reactions     Amoxicillin      Rash, hives     Codeine Sulfate Nausea and Vomiting     Hydrocodone Nausea and Vomiting     Lactose Diarrhea     dairy     Seasonal Allergies        FAMILY HISTORY:  No pertinent family history    SOCIAL HISTORY:  Social History     Tobacco Use     Smoking status: Never     Smokeless tobacco: Never   Vaping Use     Vaping Use: Never used   Substance Use Topics     Alcohol use: Yes     Comment: wine 1-2 glasses on weekends     Drug use: No         The patient's past medical, family, and social history was reviewed and confirmed.    REVIEW OF SYMPTOMS:      General: Negative   Eyes: Negative   Ear, Nose and Throat: Negative   Respiratory: Negative   Cardiovascular: Negative   Gastrointestinal: Negative   Genito-urinary: Negative   Musculoskeletal: Negative  Neurological: Negative   Psychological: Negative  HEME: Negative   ENDO: Negative   SKIN: Negative    VITALS:  There were no vitals filed for this visit.    EXAM:  General: NAD, A&Ox3  HEENT: NC/AT  CV: RRR by peripheral pulse  Pulmonary: Non-labored breathing on RA  RUE:  Mild bruising/swelling directly over the lateral epicondyle where it is maximally tender  Full elbow flexion/extension, pronation, supination  There is no instability for varus  stress. Negative pivot shift. Negative push off test  +TTP over lateral epicondyle with resisted MF extension and wrist extension, this does not improve with counterforce applied to common extensor muscle belly  5/5 EPL/FPL/HI  SILT m/r/u  WWP CR< 2s      IMAGING:  MRI of the R elbow 12/8/22 demonstrates severe tendinosis of the common extensor tendon near the origin which has sustained a partial tear off the lateral epicondyle. Some small fibers remain intact. Partial tearing of the LUCL, MRI reports tearing of the radial collateral ligament as well but this is not well appreciated    XR R elbow 3/23/22 normal    I have personally reviewed the above images and labs.         IMPRESSION AND RECOMMENDATIONS:  Ms. Sammie Ramirez is a 47 year old female right hand dominant with severe right lateral epicondylitis and partial tearing of the common extensor tendon off lateral epicondyle.    I had a lengthy discussion with the patient regarding lateral epicondylitis.  I discussed that this is tendinosis and a degenerative condition, as opposed to tendonitis which is inflammatory. The quality of the tendon is poor and difficult to successfully repair or reattach and prone to re-tear. Surgery for this condition is limited to debridement and in severe cases, the ECRB can be released from the lateral epicondyle, which in effect is what has already happened to her.     While the MRI read describes tearing of the radial collateral ligament and lateral ulnar collateral ligament, she has no evidence of instability with negative pivot shift and negative push off. It would be highly unlikely for these stout ligaments to be torn in this condition.    The patient voiced frustration due to the pain. Unfortunately I discussed that surgery is not a magic bullet and that results could not be guaranteed. Most patients respond with time, immobilization, and hand therapy for stretching and strengthening, but this can take a year or more.    I  recommended she see hand therapy to obtain a custom thermoplastic long arm splint to immobilize the elbow and wrist. I think this will help significantly particularly at night to support the elbow. I recommended she start taking 2 tablets of Aleve (naproxen) and 2 tablets of Tylenol Arthritis strength at night before bed for longer acting pain relief.    She should wear the splint at all times except for hygiene for the next 6 weeks. After 6 weeks, should start hand therapy for stretching for lateral epicondylitis.    Follow-up with me in 3 months. If she has no improvement, we can consider debridement.    Patient voiced understanding and agreement, all questions answered.    Mahesh Milligan MD    Hand, Upper Extremity & Microvascular Surgery  Department of Orthopaedic Surgery  [facility]

## 2022-12-14 NOTE — PROGRESS NOTES
I reviewed the recent orthopedic management of elbow discomfort.  She has already been advised to get established with new primary care provider per review of notes from earlier this week.

## 2023-01-06 NOTE — PROGRESS NOTES
Hand Therapy Initial Evaluation    Current Date:  1/6/2023    Diagnosis: Lateral epicondylitis of right elbow   DOI: 12/13/22 (script date); Mahesh Boucher     Per recent MD visit on 12/13/22, patient presented with right elbow pain. H/o CSI for right lateral epicondylitis which she had relief for almost 9 months until another injury in mid October when she went to pick something up and felt a painful pop. Reports severe pain and difficulty with grasping, lifting, and signing for work. Recommend hand therapy for custom thermoplastic long arm splint to immobilize the elbow and wrist, stretching, and strengthening. Recommend splint at all times except for hygiene for the next 6 weeks. After 6 weeks, begin hand therapy for stretching for lateral epicondylitis      Subjective:  Sammie Ramirez is a 47 year old female.    Patient reports symptoms of the right elbow pain which occurred s/p injury when she went to pick something up and felt a painful pop. Since onset symptoms are Unchanged  General health as reported by patient is good.  Pertinent medical history includes:Migraines/Headaches  Medical allergies:none.  Surgical history: other: knee cap (2005).  Medication history: tylenol and aleve.    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   Job Tasks: Driving, Lifting, Carrying, Pushing, Pulling, Repetitive Tasks    Occupational Profile Information:  Right hand dominant  Prior functional level:  no limitations  Patient reports symptoms of pain, stiffness/loss of motion and weakness/loss of strength  Special tests:  x-ray and MRI (+severe tendinosis of the common extensor tendon, +partial tearing of the LUCL)   Previous treatment: tylenol    Mobility: No difficulty  Transportation: drives  Currently working in normal job without restrictions  Other: *Needs  for communication     Functional Outcome Measure:   Upper Extremity Functional Index Score:  SCORE:   Column Totals: /80: 24   (A lower score indicates  New. Due to URI with congestion. Start nasal steroid and frequent nasal saline rinses 3-4 x per day   greater disability.)        Objective:  Pain Level (Scale 0-10)   1/11/2023   At Rest 9-10/10    With Use 9-10/10      Pain Description  Date 1/11/2023   Location R elbow LEP    Pain Quality Burning   Frequency constant     Pain is worst  daytime or nighttime   Exacerbated by  with use    Relieved by Rest, tylenol    Progression Unchanged      Sensation  WNL throughout all nerve distributions; per patient report    Palpation  Pain Report:  - none    + mild    ++ moderate    +++ severe    1/11/2023    R   ECRB at LEP +++   ECU at LEP +++   EDC at LEP +++   Radial Head ++   Extensor Wad -   PIN Site -     ROM  Demonstrates WFL R wrist and elbow ROM, but limited by pain     Strength   (Measured in pounds)  Testing deferred due to pain       Assessment:  Patient presents with symptoms consistent with diagnosis of right elbow pain, with conservative intervention.     Patient's limitations or Problem List includes:  Pain, Decreased ROM/motion and Weakness of the right elbow which interferes with the patient's ability to perform Work Tasks, Sleep Patterns,Household Chores and Driving  as compared to previous level of function.    Rehab Potential:  Good - Return to full activity, some limitations    Patient will benefit from skilled Occupational Therapy to increase ROM and flexibility and decrease pain to return to previous activity level and resume normal daily tasks and to reach their rehab potential.    Barriers to Learning:  Hearing    Communication Issues:  Patient has an  for communication (ASL).     Chart Review: Brief history including review of medical and/or therapy records relating to the presenting problem    Identified Performance Deficits: functional mobility, driving and community mobility, home establishment and management, meal preparation and cleanup, shopping, work    Assessment of Occupational Performance:  5 or more Performance Deficits    Clinical Decision Making (Complexity): Low  complexity    Treatment Explanation:  The following has been discussed with the patient:  RX ordered/plan of care  Anticipated outcomes  Possible risks and side effects    Plan:  Frequency:  1 X week, once daily  Duration:  for 8 weeks    Treatment Plan:    Modalities:    US and Paraffin   Therapeutic Exercise:    AROM, AAROM, PROM, Tendon Gliding, Blocking, Place and Hold, Contract Relax, Isotonics and Isometric  Neuromuscular re-ed:   Kinesiotaping, Isometrics and Stabilization  Manual Techniques:   Friction massage and Myofascial release  Orthotic Fabrication:    Static, Forearm based and Long arm  Self Care:    Ergonomic Considerations and Work Tasks    Discharge Plan:  Achieve all LTG.  Independent in home treatment program.  Reach maximal therapeutic benefit.    Home Program:   Orthosis wear and care    Next Visit:  Reassess splint   Begin stretches after 6 weeks of immobilization in splint

## 2023-01-11 ENCOUNTER — THERAPY VISIT (OUTPATIENT)
Dept: OCCUPATIONAL THERAPY | Facility: CLINIC | Age: 48
End: 2023-01-11
Attending: STUDENT IN AN ORGANIZED HEALTH CARE EDUCATION/TRAINING PROGRAM
Payer: MEDICARE

## 2023-01-11 DIAGNOSIS — M77.11 LATERAL EPICONDYLITIS OF RIGHT ELBOW: ICD-10-CM

## 2023-01-11 DIAGNOSIS — M25.521 ELBOW PAIN, RIGHT: ICD-10-CM

## 2023-01-11 PROCEDURE — 97165 OT EVAL LOW COMPLEX 30 MIN: CPT | Mod: GO

## 2023-01-11 PROCEDURE — T1013 SIGN LANG/ORAL INTERPRETER: HCPCS | Mod: U3

## 2023-01-11 PROCEDURE — 97760 ORTHOTIC MGMT&TRAING 1ST ENC: CPT | Mod: GO

## 2023-01-11 NOTE — PROGRESS NOTES
[facility]    OUTPATIENT Occupational Therapy ORTHOPEDIC EVALUATION  PLAN OF TREATMENT FOR OUTPATIENT REHABILITATION  (COMPLETE FOR INITIAL CLAIMS ONLY)  Patient's Last Name, First Name, M.I.  YOB: 1975  Sammie Ramirez    Provider s Name:  [facility]   Medical Record No.  [id number]   Start of Care Date:  01/11/23   Onset Date:   12/13/22 (script date)   Treatment Diagnosis:  Lateral epicondylitis of right elbow Medical Diagnosis:  Lateral epicondylitis of right elbow       Goals:     01/11/23 0500   Goal #1   Goal #1 household chores   Previous Performance Level Independent   Current Functional Task Carry   Current Performance Level 10/10 pain   STG [employer] Shopping bag   STG Target Perform Level 6/10 pain   Due Date 02/08/23   LTG Target Task/Performance Other - on additional line   Other Household Chores 4/10 pain   Due Date 03/08/23         Therapy Frequency:  1x/week  Predicted Duration of Therapy Intervention:  8 weeks    Osmar Vázquez OTR                 I CERTIFY THE NEED FOR THESE SERVICES FURNISHED UNDER        THIS PLAN OF TREATMENT AND WHILE UNDER MY CARE     (Physician attestation of this document indicates review and certification of the therapy plan).                     Certification Date From:  01/11/23   Certification Date To:  03/08/23    Referring Provider:  Mahesh Milligan    Initial Assessment        See Epic Evaluation SOC Date: 01/11/23

## 2023-01-30 ENCOUNTER — THERAPY VISIT (OUTPATIENT)
Dept: OCCUPATIONAL THERAPY | Facility: CLINIC | Age: 48
End: 2023-01-30
Payer: MEDICARE

## 2023-01-30 DIAGNOSIS — M25.521 ELBOW PAIN, RIGHT: ICD-10-CM

## 2023-01-30 DIAGNOSIS — M77.11 LATERAL EPICONDYLITIS OF RIGHT ELBOW: Primary | ICD-10-CM

## 2023-01-30 PROCEDURE — 97763 ORTHC/PROSTC MGMT SBSQ ENC: CPT | Mod: GO

## 2023-02-01 ENCOUNTER — OFFICE VISIT (OUTPATIENT)
Dept: FAMILY MEDICINE | Facility: CLINIC | Age: 48
End: 2023-02-01
Payer: MEDICARE

## 2023-02-01 VITALS
HEART RATE: 64 BPM | WEIGHT: 135.6 LBS | RESPIRATION RATE: 16 BRPM | OXYGEN SATURATION: 99 % | HEIGHT: 64 IN | TEMPERATURE: 97.3 F | BODY MASS INDEX: 23.15 KG/M2 | SYSTOLIC BLOOD PRESSURE: 136 MMHG | DIASTOLIC BLOOD PRESSURE: 76 MMHG

## 2023-02-01 DIAGNOSIS — Z76.89 ENCOUNTER TO ESTABLISH CARE WITH NEW DOCTOR: Primary | ICD-10-CM

## 2023-02-01 DIAGNOSIS — L30.9 DERMATITIS: ICD-10-CM

## 2023-02-01 PROCEDURE — 99213 OFFICE O/P EST LOW 20 MIN: CPT | Performed by: FAMILY MEDICINE

## 2023-02-01 RX ORDER — MOMETASONE FUROATE 1 MG/G
OINTMENT TOPICAL DAILY
Qty: 45 G | Refills: 0 | Status: SHIPPED | OUTPATIENT
Start: 2023-02-01

## 2023-02-01 NOTE — PROGRESS NOTES
Assessment & Plan     Encounter to establish care with a new doctor  Reviewed problem list, medications, allergies, past medical history, surgical history, social history, and family history.  Recommended preventative visit.    Dermatitis  Recommended we try a stronger topical cream, orders placed.  However patient really wanted to see Dermatology, so referral was placed for her also.  - Adult Dermatology Referral; Future  - mometasone (ELOCON) 0.1 % external ointment; Apply topically daily For up to 14 days      25 minutes spent on the date of the encounter doing chart review, history and exam, documentation and further activities per the note       See Patient Instructions    Return in about 6 months (around 8/1/2023) for Preventative Care Visit.    Jocy Gimenez MD  [facility] ROMEL Cochran is a 47 year old accompanied by her , presenting for the following health issues:    History of Present Illness       Reason for visit:  Follow upmedication    She eats 2-3 servings of fruits and vegetables daily.She consumes 1 sweetened beverage(s) daily.She exercises with enough effort to increase her heart rate 30 to 60 minutes per day.  She exercises with enough effort to increase her heart rate 4 days per week.   She is taking medications regularly.     Would like to discuss all medications, does not need today, but will get established today.    Has nausea lately, started about one month ago.  None right now.    Also has some itching on her stomach, has been using on her itching, has not really improved.  Feels dry.  Tried hydrocortisone and this, neither worked. Tried Vaseline and other moisturizers, aquaphor, did not help.  Tried the creams at least twice per day for 1-2 weeks without improvement.  Has been present for one year.    Review of Systems   Constitutional, HEENT, cardiovascular, pulmonary, gi and gu systems are negative, except as otherwise noted.     "  Objective    /76 (BP Location: Right arm, Patient Position: Sitting, Cuff Size: Adult Regular)   Pulse 64   Temp 97.3  F (36.3  C) (Temporal)   Resp 16   Ht 1.626 m (5' 4\")   Wt 61.5 kg (135 lb 9.6 oz)   SpO2 99%   BMI 23.28 kg/m    Body mass index is 23.28 kg/m .  Physical Exam   GENERAL: healthy, alert and no distress  SKIN: small mildly erythematous patch on abdomen                "

## 2023-02-02 ENCOUNTER — TELEPHONE (OUTPATIENT)
Dept: FAMILY MEDICINE | Facility: CLINIC | Age: 48
End: 2023-02-02

## 2023-02-02 NOTE — TELEPHONE ENCOUNTER
Unfortunately she does not meet criteria for an urgent referral.  She should do as advised and check with insurance OR We can put in a referral to one of the external affiliated partners:    [facility] in [place], [facility] in [place], [facility] in [place].    There are a couple other partners are in [place], [place], etc.

## 2023-02-02 NOTE — TELEPHONE ENCOUNTER
"  Order/Referral Request    Who is requesting: Sammie    Orders being requested: Patient calling stating she called the dermatology clinic, they can not see her until July. She states need it to be a priority to possibly get her in sooner, asking for a \"priority referral\" I also suggested she check with her insurance to see if she could check and other options for dermatology.   Reason service is needed/diagnosis:     When are orders needed by:     Has this been discussed with Provider:     Does patient have a preference on a Group/Provider/Facility?     Does patient have an appointment scheduled?: No    Where to send orders:     Could we send this information to you in [employer] or would you prefer to receive a phone call?:   Patient would prefer a phone call   Okay to leave a detailed message?: Yes at Cell number on file:    Telephone Information:   Mobile [phone]     "

## 2023-02-03 NOTE — TELEPHONE ENCOUNTER
Patient calling back.  Advised of below.  Gave names and numbers of dermatology options.  She will check availability and call back with who she prefers new referral be sent to.  Cierra Gao RN

## 2023-02-06 ENCOUNTER — MYC MEDICAL ADVICE (OUTPATIENT)
Dept: FAMILY MEDICINE | Facility: CLINIC | Age: 48
End: 2023-02-06
Payer: MEDICARE

## 2023-02-06 DIAGNOSIS — L30.9 DERMATITIS: Primary | ICD-10-CM

## 2023-02-07 ENCOUNTER — TELEPHONE (OUTPATIENT)
Dept: FAMILY MEDICINE | Facility: CLINIC | Age: 48
End: 2023-02-07
Payer: MEDICARE

## 2023-02-07 NOTE — TELEPHONE ENCOUNTER
Patient Quality Outreach      Summary:    Patient has the following on her problem list/HM: None    Patient is due/failing the following:   Colon Cancer Screening    Type of outreach:    Sent [employer] message.    Questions for provider review:    None                                                                                                                                     Marah Camargo

## 2023-02-15 DIAGNOSIS — R11.2 NON-INTRACTABLE VOMITING WITH NAUSEA: ICD-10-CM

## 2023-02-16 ENCOUNTER — MYC MEDICAL ADVICE (OUTPATIENT)
Dept: FAMILY MEDICINE | Facility: CLINIC | Age: 48
End: 2023-02-16
Payer: MEDICARE

## 2023-02-16 DIAGNOSIS — Z00.00 ENCOUNTER FOR PREVENTATIVE ADULT HEALTH CARE EXAMINATION: ICD-10-CM

## 2023-02-16 DIAGNOSIS — R11.2 NON-INTRACTABLE VOMITING WITH NAUSEA: ICD-10-CM

## 2023-02-16 DIAGNOSIS — Z12.11 COLON CANCER SCREENING: Primary | ICD-10-CM

## 2023-02-17 NOTE — TELEPHONE ENCOUNTER
Established care with new provider, routing to refill under this provider's name.  Alea Malagon RN

## 2023-02-17 NOTE — TELEPHONE ENCOUNTER
Please see [employer] message in reference to colon cancer screening orders. Routed to PCP, Please review and advise.     Thank you,    Roshan Reddy RN

## 2023-02-20 RX ORDER — ONDANSETRON 8 MG/1
TABLET, FILM COATED ORAL
Qty: 20 TABLET | Refills: 0 | Status: SHIPPED | OUTPATIENT
Start: 2023-02-20 | End: 2023-05-03

## 2023-02-20 RX ORDER — ONDANSETRON 8 MG/1
TABLET, FILM COATED ORAL
Qty: 20 TABLET | Refills: 0 | Status: SHIPPED | OUTPATIENT
Start: 2023-02-20 | End: 2023-08-09

## 2023-02-20 RX ORDER — OMEPRAZOLE 40 MG/1
CAPSULE, DELAYED RELEASE ORAL
Qty: 90 CAPSULE | Refills: 3 | Status: SHIPPED | OUTPATIENT
Start: 2023-02-20 | End: 2024-02-20

## 2023-03-01 ENCOUNTER — THERAPY VISIT (OUTPATIENT)
Dept: OCCUPATIONAL THERAPY | Facility: CLINIC | Age: 48
End: 2023-03-01
Attending: FAMILY MEDICINE
Payer: MEDICARE

## 2023-03-01 DIAGNOSIS — M25.521 ELBOW PAIN, RIGHT: Primary | ICD-10-CM

## 2023-03-01 DIAGNOSIS — M77.11 LATERAL EPICONDYLITIS OF RIGHT ELBOW: ICD-10-CM

## 2023-03-01 PROCEDURE — T1013 SIGN LANG/ORAL INTERPRETER: HCPCS | Mod: U3

## 2023-03-01 PROCEDURE — 97110 THERAPEUTIC EXERCISES: CPT | Mod: GO

## 2023-03-01 NOTE — PROGRESS NOTES
SOAP note objective information for 3/1/2023.    Diagnosis: Lateral epicondylitis of right elbow   DOI: 12/13/22 (script date); Mahesh Boucher   Post: splint immobilization from 1/11/23 to 3/1/23 (7 weeks)     Per recent MD visit on 12/13/22, patient presented with right elbow pain. H/o CSI for right lateral epicondylitis which she had relief for almost 9 months until another injury in mid October when she went to pick something up and felt a painful pop. Reports severe pain and difficulty with grasping, lifting, and signing for work. Recommend hand therapy for custom thermoplastic long arm splint to immobilize the elbow and wrist, stretching, and strengthening. Recommend splint at all times except for hygiene for the next 6 weeks. After 6 weeks, begin hand therapy for stretching for lateral epicondylitis      Objective:  Pain Level (Scale 0-10)    1/11/2023 3/1/23   At Rest 9-10/10  4-5/10    With Use 9-10/10  4-5/10       Pain Description  Date 1/11/2023 3/1/23   Location R elbow LEP  R elbow LEP    Pain Quality Burning Achy pain, tender   Frequency constant   Constant    Pain is worst  daytime or nighttime Daytime or nighttime    Exacerbated by  with use  With use    Relieved by Rest, tylenol  Rest    Progression Unchanged  Gradually getting better       Sensation  WNL throughout all nerve distributions; per patient report     Palpation  Pain Report:  - none    + mild    ++ moderate    +++ severe     1/11/2023     R   ECRB at LEP +++   ECU at LEP +++   EDC at LEP +++   Radial Head ++   Extensor Wad -   PIN Site -      ROM  Demonstrates WFL R wrist and elbow ROM, but limited by pain      Strength   (Measured in pounds)  Testing deferred due to pain     Please refer to the daily flowsheet for treatment today, total treatment time and time spent performing 1:1 timed codes.

## 2023-03-06 ENCOUNTER — TELEPHONE (OUTPATIENT)
Dept: GASTROENTEROLOGY | Facility: CLINIC | Age: 48
End: 2023-03-06
Payer: MEDICARE

## 2023-03-06 ENCOUNTER — HOSPITAL ENCOUNTER (OUTPATIENT)
Facility: CLINIC | Age: 48
End: 2023-03-06
Attending: INTERNAL MEDICINE | Admitting: INTERNAL MEDICINE
Payer: MEDICARE

## 2023-03-06 NOTE — TELEPHONE ENCOUNTER
Screening Questions  BLUE  KIND OF PREP RED  LOCATION [review exclusion criteria] GREEN  SEDATION TYPE    Y  Are you active on mychart?   Coming Jocy Gimenez MD in   Ordering/Referring Provider?    MEDICARE/MEDICAID  What type of coverage do you have?  N  Have you had a positive covid test in the last 14 days?    21.5  1. BMI  [BMI 40+ - review exclusion criteria - [facility] + [facility]]            *NEED PAC APPT AT [facility] + [facility] (ONLY)*     SELF   2. Are you able to give consent for your medical care? [IF NO,RN REVIEW]          N  3. Are you taking any prescription pain medications on a routine schedule   (ex narcotics: tramadol, oxycodone, roxicodone, oxycontin,  and percocet)?    [RN Review]             N  3a. EXTENDED PREP What kind of prescription?     N 4. Do you have any chemical dependencies such as alcohol, street drugs, or methadone?    **If yes 3- 5 , please schedule with MAC sedation.**          IF YES TO ANY 6 - 10 - HOSPITAL SETTING ONLY.     N 6.   Do you need assistance transferring?     N 7.   Have you had a heart or lung transplant?    N 8.   Are you currently on dialysis?   N 9.   Do you use daily home oxygen?   N 10. Do you take nitroglycerin?   10a. N If yes, how often?     11. [FEMALES]   Are you currently pregnant?     11a.  If yes, how many weeks? [ Greater than 12 weeks, OR NEEDED]    N 12. [review exclusion criteria]  Do you have any implantable devices in your body (pacemaker, defib, LVAD)?            *NEED PAC APPT AT [facility]*     N 13. Do you have Pulmonary Hypertension?             *NEED PAC APPT AT [facility]*     N 14. In the past 6 months, have you had any heart related issues including cardiomyopathy or heart attack?     N 14a. If yes, did it require cardiac stenting if so when?     N 15. Have you had a stroke or Transient ischemic attack (TIA - aka  mini stroke ) within 6 months?      N 16. Do you have mod to severe Obstructive Sleep Apnea?  [Hospital only]    N 17. Do you have SEVERE AND  "UNCONTROLLED asthma?              *NEED PAC APPT AT [facility]*     N 18. Are you currently taking any blood thinners?     18a. No. Continue to 19.   18b. Yes/no Blood Thinner: No. Inform patient to \"follow up w/ ordering provider for bridging instructions.\"    N 19. Do you take the medication Phentermine?    19a. If yes, \"Hold for 7 days before procedure.  Please consult your prescribing provider if you have questions about holding this medication.\"     N  20. Do you have chronic kidney disease?      N  21. Do you have a diagnosis of diabetes?     N  22. On a regular basis do you go 3-5 days between bowel movements?     23. Preferred LOCAL Pharmacy for Pre Prescription    [ LIST ONLY ONE PHARMACY]        [id number] - [facility] [address]          - CLOSING REMINDERS -    Informed patient they will need an adult          Cannot take any type of public or medical transportation alone    Conscious Sedation- Needs  for 6 hours after the procedure        MAC/General-Needs  for 24 hours after procedure    Pre-Procedure Covid test to be completed         [[employer] PCR/HOME Testing Required] + ADULT to ACCOMPANY     Confirmed Nurse will call to complete assessment       - SCHEDULING DETAILS -      Hospital Setting Required? If yes, what is the exclusion?:  N      Additional comments:  ADOLFO MUNOZ   Surgeon   05/31/2023  Date of Procedure  MODERATE  Sedation Type     N  PAC / Pre-op Required   Location  [facility]        Type of Procedure Scheduled  Lower Endoscopy [Colonoscopy]      Which Colonoscopy Prep was Sent?     STANDARD GOLYTELY-If you answer yes to questions #8, #20, #21          "

## 2023-04-03 ENCOUNTER — THERAPY VISIT (OUTPATIENT)
Dept: OCCUPATIONAL THERAPY | Facility: CLINIC | Age: 48
End: 2023-04-03
Payer: MEDICARE

## 2023-04-03 DIAGNOSIS — M77.11 LATERAL EPICONDYLITIS OF RIGHT ELBOW: Primary | ICD-10-CM

## 2023-04-03 DIAGNOSIS — M25.521 ELBOW PAIN, RIGHT: ICD-10-CM

## 2023-04-03 PROCEDURE — 97535 SELF CARE MNGMENT TRAINING: CPT | Mod: GO | Performed by: OCCUPATIONAL THERAPIST

## 2023-04-03 PROCEDURE — 97530 THERAPEUTIC ACTIVITIES: CPT | Mod: GO | Performed by: OCCUPATIONAL THERAPIST

## 2023-04-03 PROCEDURE — 97110 THERAPEUTIC EXERCISES: CPT | Mod: GO | Performed by: OCCUPATIONAL THERAPIST

## 2023-04-03 NOTE — PROGRESS NOTES
Hand Therapy Progress Note    Current Date:  4/3/2023      Diagnosis: Lateral epicondylitis of right elbow   DOI: 12/13/22 (script date); Mahesh Boucher   Post: splint immobilization from 1/11/23 to 3/1/23 (11 weeks, 5 days)     Per recent MD visit on 12/13/22, patient presented with right elbow pain. H/o CSI for right lateral epicondylitis which she had relief for almost 9 months until another injury in mid October when she went to pick something up and felt a painful pop. Reports severe pain and difficulty with grasping, lifting, and signing for work. Recommend hand therapy for custom thermoplastic long arm splint to immobilize the elbow and wrist, stretching, and strengthening. Recommend splint at all times except for hygiene for the next 6 weeks. After 6 weeks, begin hand therapy for stretching for lateral epicondylitis      Reporting period is 3/12/2023 to 4/3/2023    Subjective:   Subjective changes noted by patient:  It seems my pain is every other day or so, a couple weeks ago I had to cancel because my arm wasn't feeling well.   Functional changes noted by patient:  Improvement in Self Care Tasks (dressing), Sleep Patterns and Household Chores  Patient has noted adverse reaction to:  None    Objective:  Pain Level (Scale 0-10)    1/11/2023 3/1/23 4/3/2023   At Rest 9-10/10  4-5/10  3-5/10   With Use 9-10/10  4-5/10  4-5/10      Pain Description  Date 1/11/2023 3/1/23 4/3/2023   Location R elbow LEP  R elbow LEP  Extensor tendon insertion, LEP   Pain Quality Burning Achy pain, tender Achy, tender   Frequency constant   Constant  Intermittent   Pain is worst  daytime or nighttime Daytime or nighttime  Daytime and night ttime, improving in frequency and duration   Exacerbated by  with use  With use  With use   Relieved by Rest, tylenol  Rest  Rest   Progression Unchanged  Gradually getting better  Slowly getting better      Sensation  WNL throughout all nerve distributions; per patient  report     Palpation  Pain Report:  - none    + mild    ++ moderate    +++ severe     1/11/2023 4/3/2023     R R   ECRB at LEP +++ +   ECU at LEP +++ +   EDC at LEP +++ +   Radial Head ++ +   Extensor Wad - -   PIN Site - -      ROM  Demonstrates WFL R wrist and elbow ROM, but limited by pain      Strength   (Measured in pounds)  Pain Report: - none  + mild    ++ moderate    +++ severe    4/3/2023 4/3/2023   Trials L R   1  2  3 28 16   Average 28 16       Please refer to the daily flowsheet for treatment today, total treatment time and time spent performing 1:1 timed codes.       Assessment:  Response to therapy has been improvement to:  Pain:  frequency is less, intensity of pain is decreased, duration of pain is decreased and less tender over affected area  Sensitivity:  intensity is less and smaller area of involvement    Overall Assessment:  Patient's symptoms are resolving.  Patient is becoming more independent in home exercise program  STG/LTG:  STGoals have been reviewed and progress or achievement has occurred;  see goal sheet for details and updates.  LTGoals have been reviewed and progress or achievement has occurred:  see goal sheet for details and updates.    Plan:  Frequency/Duration:  Recommend continuing with the current treatment plan. 1 X week, once daily  for 8 weeks  Appropriateness of Rx I have re-evaluated this patient and find that the nature, scope, duration and intensity of the therapy is appropriate for the medical condition of the patient.  Recommendations for Continued Therapy    Treatment Plan:  Discontinue long-arm orthosis, continue with wrist cock-up at night as tolerated, also while at work. Trial weaning while at rest and with light activity at home.  AROM wrist flexion/extension, graded extrinsic extensor stretch to tolerance  Apply warmth prior to stretch/exercise    Next Visit:  Assess tolerance with orthosis weaning  MFR   Consider initiation of isometric, light eccentric  wrist strengthening

## 2023-04-03 NOTE — PROGRESS NOTES
[facility]    OUTPATIENT Occupational Therapy ORTHOPEDIC EVALUATION  PLAN OF TREATMENT FOR OUTPATIENT REHABILITATION  (COMPLETE FOR INITIAL CLAIMS ONLY)  Patient's Last Name, First Name, M.I.  YOB: 1975  Sammie Ramirez    Provider s Name:  [facility]TR[facility]   Medical Record No.  [id number]   Start of Care Date:  01/11/23   Onset Date:   12/13/22 (script date)   Treatment Diagnosis:  Lateral epicondylitis of right elbow Medical Diagnosis:     Lateral epicondylitis of right elbow  Elbow pain, right       Goals:     04/03/23 0500   Goal #1   Goal #1 household chores   Previous Performance Level Independent   Current Functional Task Carry   Current Performance Level 4-5/10 pain   STG [employer] Shopping bag   STG Target Perform Level 4/10 pain   Due Date 05/15/23   If goal not met, Why? Progressing, goal extended   LTG Target Task/Performance Other - on additional line   Other Household Chores 3/10   Due Date 05/29/23   If goal not met, Why? Goal revised         Therapy Frequency:  1x/week  Predicted Duration of Therapy Intervention:  Additional 8 weeks    Adama Arreola OT                 I CERTIFY THE NEED FOR THESE SERVICES FURNISHED UNDER        THIS PLAN OF TREATMENT AND WHILE UNDER MY CARE     (Physician attestation of this document indicates review and certification of the therapy plan).                     Certification Date From:  04/03/23   Certification Date To:  05/29/23    Referring Provider:  Mahesh Milligan    Initial Assessment        See Epic Evaluation SOC Date: 01/11/23

## 2023-04-07 ENCOUNTER — MYC MEDICAL ADVICE (OUTPATIENT)
Dept: FAMILY MEDICINE | Facility: CLINIC | Age: 48
End: 2023-04-07
Payer: MEDICARE

## 2023-05-01 ENCOUNTER — TRANSFERRED RECORDS (OUTPATIENT)
Dept: HEALTH INFORMATION MANAGEMENT | Facility: CLINIC | Age: 48
End: 2023-05-01

## 2023-05-03 ENCOUNTER — OFFICE VISIT (OUTPATIENT)
Dept: FAMILY MEDICINE | Facility: CLINIC | Age: 48
End: 2023-05-03
Payer: MEDICARE

## 2023-05-03 VITALS
TEMPERATURE: 98.3 F | OXYGEN SATURATION: 100 % | DIASTOLIC BLOOD PRESSURE: 74 MMHG | HEART RATE: 99 BPM | RESPIRATION RATE: 12 BRPM | HEIGHT: 64 IN | SYSTOLIC BLOOD PRESSURE: 110 MMHG | WEIGHT: 134 LBS | BODY MASS INDEX: 22.88 KG/M2

## 2023-05-03 DIAGNOSIS — G43.009 MIGRAINE WITHOUT AURA AND WITHOUT STATUS MIGRAINOSUS, NOT INTRACTABLE: ICD-10-CM

## 2023-05-03 DIAGNOSIS — Z87.898 HISTORY OF MOTION SICKNESS: Primary | ICD-10-CM

## 2023-05-03 DIAGNOSIS — R11.2 NON-INTRACTABLE VOMITING WITH NAUSEA: ICD-10-CM

## 2023-05-03 PROCEDURE — 99214 OFFICE O/P EST MOD 30 MIN: CPT | Performed by: PHYSICIAN ASSISTANT

## 2023-05-03 RX ORDER — SCOLOPAMINE TRANSDERMAL SYSTEM 1 MG/1
1 PATCH, EXTENDED RELEASE TRANSDERMAL
Qty: 4 PATCH | Refills: 2 | Status: SHIPPED | OUTPATIENT
Start: 2023-05-03 | End: 2023-08-09 | Stop reason: SINTOL

## 2023-05-03 RX ORDER — SUMATRIPTAN 100 MG/1
100 TABLET, FILM COATED ORAL
Qty: 9 TABLET | Refills: 3 | Status: SHIPPED | OUTPATIENT
Start: 2023-05-03

## 2023-05-03 RX ORDER — ONDANSETRON 8 MG/1
TABLET, FILM COATED ORAL
Qty: 20 TABLET | Refills: 3 | Status: SHIPPED | OUTPATIENT
Start: 2023-05-03 | End: 2023-12-27

## 2023-05-03 NOTE — PROGRESS NOTES
SUBJECTIVE: Sammie Ramirez , a 47 year old  female, presents for counseling and information regarding upcoming travel to [place] and [place]. Special medical concerns include: nausea. She anticipates the following unusual exposures: none.    Itinerary:  Cruise    Departure Date: 6/3/2023 Return date: 6/11/223    Reason for travel (i.e. Business, pleasure): business    Visiting an urban or rural area?: unknown    Accommodations (i.e. hotel, hostel, friends, family, etc): hotel/cruise    Women - First day of your last period: 4/10/2023    IMMUNIZATION HISTORY  Have you received any vaccinations in the past 4 weeks?  No  Have you ever fainted from having your blood drawn or from an injection?  No  Have you ever had a fever reaction to vaccination?  No  Have you ever had any bad reaction or side effect from any vaccination?  No  Have you ever had hepatitis A or B vaccine?  No  Do you live (or work closely) with anyone who has AIDS, an AIDS-like condition, any other immune disorder or who is on chemotherapy for cancer?  No  Have you received any injection of immune globulin or any blood products during the past 12 months?  No    GENERAL MEDICAL HISTORY  Do you have a medical condition that warrants maintenance medication or physician follow-up?  No  Do you have a medical condition that is stable now, but that may recur while traveling?  No  Has your spleen been removed?  No  Have you had an acute illness or a fever in the past 48 hours?  No  Are you pregnant, or might you become pregnant on this trip?  Any chance of pregnancy?  No  Are you breastfeeding?  No  Do you have HIV, AIDS, an AIDS-like condition, any other immune disorder, leukemia or cancer?  No  Do you have a severe combined immunodeficiency disease?  No  Have you had your thymus gland removed or history of problems with your thymus, such as myasthenia gravis, DiGeorge syndrome, or thymoma?  No    Do you have severe thrombocytopenia (low platelet count) or a  coagulation disorder?  No  Have you ever had a convulsion, seizure, epilepsy, neurologic condition or brain infection?  No  Do you have any stomach conditions?  Yes  Do you have a G6PD deficiency?  No  Do you have severe renal or kidney impairment?  No  Do you have a history of psychiatric problems?  No  Do you have a problem with strange dreams and/or nightmares?  No  Do you have insomnia?  No  Do you have problems with vaginitis?  No  Do you have psoriasis?  No  Are you prone to motion sickness?  Yes  Have you ever had headaches, nausea, vomiting, or breathing problems from altitude exposure?  Yes      Past Medical History:   Diagnosis Date     ASCUS (atypical squamous cells of undetermined significance) on Pap smear 10/13/2016    Neg HPV     History of colposcopy 06/14/2018 5/24/21     LSIL (low grade squamous intraepithelial lesion) on Pap smear 02/01/2014      Immunization History   Administered Date(s) Administered     COVID-19 Monovalent 12+ (Pfizer 2022) 12/06/2022, 01/17/2023     Pneumo Conj 13-V (2010&after) 07/01/2012     TDAP (Adacel,Boostrix) 02/01/2009, 06/28/2021       Current Outpatient Medications   Medication Sig Dispense Refill     acetaminophen (TYLENOL) 325 MG tablet Take 3 tablets (975 mg) by mouth every 6 hours as needed for mild pain 50 tablet 0     hydrocortisone, Perianal, (ANUSOL-HC) 2.5 % cream APPLY RECTALLY TO THE AFFECTED AREA TWICE DAILY (Patient taking differently: 2 times daily as needed) 60 g 1     hydrOXYzine (VISTARIL) 50 MG capsule Take 1 tablet every 6 hours as needed for itching 12 capsule 0     levocetirizine (XYZAL) 5 MG tablet Take 1 tablet (5 mg) by mouth every evening 30 tablet 1     meloxicam (MOBIC) 15 MG tablet Take 1 tablet (15 mg) by mouth daily 30 tablet 3     mometasone (ELOCON) 0.1 % external ointment Apply topically daily For up to 14 days 45 g 0     mometasone (NASONEX) 50 MCG/ACT nasal spray Spray 2 sprays into both nostrils daily (Patient taking  differently: Spray 2 sprays into both nostrils daily as needed) 17 g 1     montelukast (SINGULAIR) 10 MG tablet Take 1 tablet (10 mg) by mouth At Bedtime (Patient taking differently: Take 10 mg by mouth nightly as needed) 30 tablet 1     naloxone (NARCAN) 4 MG/0.1ML nasal spray Spray 1 spray (4 mg) into one nostril alternating nostrils as needed for opioid reversal every 2-3 minutes until assistance arrives 0.2 mL 0     omeprazole (PRILOSEC) 40 MG DR capsule TAKE 1 CAPSULE(40 MG) BY MOUTH DAILY 90 capsule 3     ondansetron (ZOFRAN) 8 MG tablet TAKE 1 TABLET(8 MG) BY MOUTH EVERY 8 HOURS AS NEEDED FOR NAUSEA 20 tablet 0     ondansetron (ZOFRAN) 8 MG tablet TAKE 1 TABLET(8 MG) BY MOUTH EVERY 8 HOURS AS NEEDED FOR NAUSEA 20 tablet 0     polyethylene glycol (MIRALAX) 17 GM/Dose powder Take 17 g (1 capful) by mouth daily (Patient taking differently: Take 1 capful by mouth daily as needed) 500 g 0     rizatriptan (MAXALT) 10 MG tablet Take 1 tablet (10 mg) by mouth at onset of headache for migraine May repeat in 2 hours. Max 3 tablets/24 hours. 18 tablet 1     SUMAtriptan (IMITREX) 100 MG tablet Take 1 tablet (100 mg) by mouth at onset of headache for migraine that occur at night.  Take rizatriptan for migraines during the day. 9 tablet 3     triamcinolone (KENALOG) 0.1 % external cream APPLY SPARINGLY TOPICALLY TO THE AFFECTED AREA THREE TIMES DAILY FOR 14 DAYS 30 g 0     Allergies   Allergen Reactions     Amoxicillin      Rash, hives     Codeine Sulfate Nausea and Vomiting     Hydrocodone Nausea and Vomiting     Hydromorphone Itching     Light reaction, not severe     Lactose Diarrhea     dairy     Seasonal Allergies      Tramadol Headache and Nausea     Unsure if reaction was due to medication or COVID vaccine        EXAM: deferred      Patient is here to discuss motion sickness for upcoming cruise to [place]. Would like some sort of medication to help prevent symptoms. She has tried over the counter medications in the  past and they made her symptoms worse. She does also have Zofran available as needed. Would like that refilled.      (Z87.898) History of motion sickness  (primary encounter diagnosis)    Comment: Recommended scopolamine patch. She has used these before post-operatively and felt they worked well.    Plan: scopolamine (TRANSDERM) 1 MG/3DAYS 72 hr patch            (R11.2) Non-intractable vomiting with nausea    Comment: Refilled.    Plan: ondansetron (ZOFRAN) 8 MG tablet            (G43.009) Migraine without aura and without status migrainosus, not intractable    Comment: Well controlled. Refilled.    Plan: SUMAtriptan (IMITREX) 100 MG tablet

## 2023-05-03 NOTE — NURSING NOTE
Prior to immunization administration, verified patients identity using patient s name and date of birth. Please see Immunization Activity for additional information.     Screening Questionnaire for Adult Immunization    Are you sick today?   No   Do you have allergies to medications, food, a vaccine component or latex?   Yes   Have you ever had a serious reaction after receiving a vaccination?   Yes   Do you have a long-term health problem with heart, lung, kidney, or metabolic disease (e.g., diabetes), asthma, a blood disorder, no spleen, complement component deficiency, a cochlear implant, or a spinal fluid leak?  Are you on long-term aspirin therapy?   No   Do you have cancer, leukemia, HIV/AIDS, or any other immune system problem?   No   Do you have a parent, brother, or sister with an immune system problem?   No   In the past 3 months, have you taken medications that affect  your immune system, such as prednisone, other steroids, or anticancer drugs; drugs for the treatment of rheumatoid arthritis, Crohn s disease, or psoriasis; or have you had radiation treatments?   No   Have you had a seizure, or a brain or other nervous system problem?   No   During the past year, have you received a transfusion of blood or blood    products, or been given immune (gamma) globulin or antiviral drug?   No   For women: Are you pregnant or is there a chance you could become       pregnant during the next month?   No   Have you received any vaccinations in the past 4 weeks?   No     Screening performed by Umu Gonzales CMA on 5/3/2023 at 9:30 AM.

## 2023-05-04 ENCOUNTER — TELEPHONE (OUTPATIENT)
Dept: GASTROENTEROLOGY | Facility: CLINIC | Age: 48
End: 2023-05-04
Payer: MEDICARE

## 2023-05-04 NOTE — TELEPHONE ENCOUNTER
Caller: Sammie Ramirez  Reason for Reschedule/Cancellation (please be detailed, any staff messages or encounters to note?): PT CONFLICT SCHEDULE       Prior to reschedule please review:    Ordering Provider:Jocy Loredo MD     Sedation per order:CS    Does patient have any ASC Exclusions, please identify?: N      Notes on Cancelled Procedure:    Procedure:Lower Endoscopy [Colonoscopy]     Date: 05/31/2023    Location:[facility]; [facility] [address] [place] [address]    Surgeon: TAMMY        Rescheduled: Yes    Procedure: Lower Endoscopy [Colonoscopy]     Date: 07/31/2023    Location:[facility]; [address] [place] [address]    Surgeon: KATHY    Sedation Level Scheduled  CS,  Reason for Sedation Level ORDER    Prep/Instructions updated and sent: SRIDEVI

## 2023-05-22 ENCOUNTER — OFFICE VISIT (OUTPATIENT)
Dept: URGENT CARE | Facility: URGENT CARE | Age: 48
End: 2023-05-22
Payer: MEDICARE

## 2023-05-22 ENCOUNTER — TELEPHONE (OUTPATIENT)
Dept: FAMILY MEDICINE | Facility: CLINIC | Age: 48
End: 2023-05-22

## 2023-05-22 ENCOUNTER — MYC REFILL (OUTPATIENT)
Dept: FAMILY MEDICINE | Facility: CLINIC | Age: 48
End: 2023-05-22

## 2023-05-22 ENCOUNTER — MYC REFILL (OUTPATIENT)
Dept: OBGYN | Facility: CLINIC | Age: 48
End: 2023-05-22

## 2023-05-22 VITALS
SYSTOLIC BLOOD PRESSURE: 128 MMHG | OXYGEN SATURATION: 100 % | TEMPERATURE: 97.6 F | HEART RATE: 57 BPM | DIASTOLIC BLOOD PRESSURE: 70 MMHG

## 2023-05-22 DIAGNOSIS — L29.9 ITCHING: ICD-10-CM

## 2023-05-22 DIAGNOSIS — H10.32 ACUTE CONJUNCTIVITIS OF LEFT EYE, UNSPECIFIED ACUTE CONJUNCTIVITIS TYPE: Primary | ICD-10-CM

## 2023-05-22 DIAGNOSIS — R11.2 NON-INTRACTABLE VOMITING WITH NAUSEA: ICD-10-CM

## 2023-05-22 DIAGNOSIS — H01.004 BLEPHARITIS OF LEFT UPPER EYELID, UNSPECIFIED TYPE: ICD-10-CM

## 2023-05-22 PROCEDURE — 99213 OFFICE O/P EST LOW 20 MIN: CPT

## 2023-05-22 RX ORDER — NEOMYCIN POLYMYXIN B SULFATES AND DEXAMETHASONE 3.5; 10000; 1 MG/ML; [USP'U]/ML; MG/ML
SUSPENSION/ DROPS OPHTHALMIC
Qty: 45 ML | Refills: 0 | Status: SHIPPED | OUTPATIENT
Start: 2023-05-22 | End: 2023-05-22

## 2023-05-22 RX ORDER — NEOMYCIN POLYMYXIN B SULFATES AND DEXAMETHASONE 3.5; 10000; 1 MG/ML; [USP'U]/ML; MG/ML
SUSPENSION/ DROPS OPHTHALMIC
Qty: 5 ML | Refills: 0 | Status: SHIPPED | OUTPATIENT
Start: 2023-05-22 | End: 2023-08-09

## 2023-05-22 NOTE — PROGRESS NOTES
(H10.32) Acute conjunctivitis of left eye, unspecified acute conjunctivitis type  (primary encounter diagnosis)  Comment:   Plan: neomycin-polymixin-dexamethasone (MAXITROL) 0.1        % ophthalmic suspension            (H01.004) Blepharitis of left upper eyelid, unspecified type  Comment:   Plan:       She is to use drops as directed.  May warm or cool packs to the eye as needed.  Continue allergy medication.    Have follow-up if not improving or if worsening.      CHIEF COMPLAINT    Inflammation left eye.      HISTORY    This patient who has a history of pollen allergies has noticed some redness and discomfort of left eyelids primarily.  She has kind of a gritty irritated feeling, not especially painful.  No mattering noted.  No change in vision.      REVIEW OF SYSTEMS    Congestion/allergy symptoms.  No short of breath or wheeze.  No fever.      EXAM  /70   Pulse 57   Temp 97.6  F (36.4  C) (Tympanic)   LMP 05/12/2023   SpO2 100%   Breastfeeding No     Mild pinkness of left upper lid and mild conjunctival redness.  No mattering or drainage.  Pupils are equal.   right eye is quiet.

## 2023-05-22 NOTE — TELEPHONE ENCOUNTER
Call received from pharmacy asking to clarify number of day supply for eye drops. It was ordered for a quantity of 45 ml. Pharmacy states this would be a 2 month supply as bottles come in a quantity of 5 ml. Please clarify.

## 2023-05-23 RX ORDER — ONDANSETRON 8 MG/1
TABLET, FILM COATED ORAL
Qty: 20 TABLET | Refills: 0 | OUTPATIENT
Start: 2023-05-23

## 2023-05-23 NOTE — TELEPHONE ENCOUNTER
rx for zofran denied, sent on 5/3/23 #20 with 3 refills to requesting pharmacy     Ann Wang, Registered Nurse  [facility]

## 2023-05-26 RX ORDER — HYDROXYZINE PAMOATE 50 MG/1
CAPSULE ORAL
Qty: 12 CAPSULE | Refills: 0 | Status: SHIPPED | OUTPATIENT
Start: 2023-05-26

## 2023-06-01 ENCOUNTER — TRANSFERRED RECORDS (OUTPATIENT)
Dept: HEALTH INFORMATION MANAGEMENT | Facility: CLINIC | Age: 48
End: 2023-06-01
Payer: MEDICARE

## 2023-06-14 ENCOUNTER — TELEPHONE (OUTPATIENT)
Dept: GASTROENTEROLOGY | Facility: CLINIC | Age: 48
End: 2023-06-14
Payer: MEDICARE

## 2023-06-14 NOTE — TELEPHONE ENCOUNTER
Caller: Sammie Ramirez  Reason for Reschedule/Cancellation (please be detailed, any staff messages or encounters to note?): conflict would not say what      Prior to reschedule please review:    Ordering Provider:Coming-Hay    Sedation per order:moderate    Does patient have any ASC Exclusions, please identify?: n      Notes on Cancelled Procedure:    Procedure:Lower Endoscopy [Colonoscopy]     Date: 7/31/23    Location:[facility]; [address] [place] [address]    Surgeon: Melisa        Rescheduled: Yes    Procedure: Lower Endoscopy [Colonoscopy]     Date: 9/18    Location[place]; [address] [place] [address]    Surgeon: Randal    Sedation Level Scheduled  moderate,  Reason for Sedation Level on block    Prep/Instructions updated and sent: steven

## 2023-07-26 DIAGNOSIS — N94.6 DYSMENORRHEA: ICD-10-CM

## 2023-07-26 RX ORDER — MELOXICAM 15 MG/1
TABLET ORAL
Qty: 30 TABLET | Refills: 3 | Status: SHIPPED | OUTPATIENT
Start: 2023-07-26 | End: 2024-01-05

## 2023-07-26 NOTE — TELEPHONE ENCOUNTER
Routing refill request to provider for review/approval because:  Drug not on the [facility] refill protocol     ZEINA Miramontes

## 2023-08-04 ENCOUNTER — OFFICE VISIT (OUTPATIENT)
Dept: URGENT CARE | Facility: URGENT CARE | Age: 48
End: 2023-08-04
Payer: MEDICARE

## 2023-08-04 VITALS
RESPIRATION RATE: 14 BRPM | BODY MASS INDEX: 22.88 KG/M2 | OXYGEN SATURATION: 100 % | DIASTOLIC BLOOD PRESSURE: 80 MMHG | HEART RATE: 64 BPM | TEMPERATURE: 97.6 F | SYSTOLIC BLOOD PRESSURE: 149 MMHG | WEIGHT: 134 LBS | HEIGHT: 64 IN

## 2023-08-04 DIAGNOSIS — L08.9 TOE INFECTION: Primary | ICD-10-CM

## 2023-08-04 PROCEDURE — 99213 OFFICE O/P EST LOW 20 MIN: CPT | Performed by: FAMILY MEDICINE

## 2023-08-04 RX ORDER — SULFAMETHOXAZOLE/TRIMETHOPRIM 800-160 MG
1 TABLET ORAL 2 TIMES DAILY
Qty: 20 TABLET | Refills: 0 | Status: SHIPPED | OUTPATIENT
Start: 2023-08-04 | End: 2023-08-09

## 2023-08-04 NOTE — PATIENT INSTRUCTIONS
Okay to take ibuprofen 200 mg - 4 tablets (800 mg) every 8 hours as needed.  Okay to take tylenol 500 mg - 2 tablets (1000 mg) every 6-8 hours as needed, do not exceed 3000 mg in 24 hours.  Take full course of antibiotic - Bactrim - 1 tablet twice a day for 10 days to treat toe infection    Warm compress/foot soak 2-3 times a day as needed

## 2023-08-04 NOTE — PROGRESS NOTES
SUBJECTIVE:  Chief Complaint   Patient presents with    Urgent Care     Left toe inflamed, feel 6/23/2023, cleaning and fell on right knee and slipped, hurt left toe. Seems to be fluid in toe, maybay melanie per patient      Sign Language Intrepreter on IPad    aSmmie Ramirez is a 47 year old female who presents with a chief complaint of left big toe pain.    S/p fall on 6/23, hit her knee and bumped the toe.  Did not hurt initially but then developed more pain and swelling.  Did not sustain any nail damage, no bruising.  Did elevated foot, has been walking, pain only at site of base of nail, swelling would wax and wane.    Went yesterday for pedicare, hurts worse afterwards.  Took a shower last night and tried to poke it afterwards, has small amount of greenish and white drainage, not a lot.  Did clean it with hydrogen peroxide and applied aquaphor and elevated foot.      Wondering if toe can be lanced to decrease swelling.      Past Medical History:   Diagnosis Date    ASCUS (atypical squamous cells of undetermined significance) on Pap smear 10/13/2016    Neg HPV    History of colposcopy 06/14/2018 5/24/21    LSIL (low grade squamous intraepithelial lesion) on Pap smear 02/01/2014     Current Outpatient Medications   Medication Sig Dispense Refill    hydrocortisone, Perianal, (ANUSOL-HC) 2.5 % cream APPLY RECTALLY TO THE AFFECTED AREA TWICE DAILY (Patient taking differently: 2 times daily as needed) 60 g 1    hydrOXYzine (VISTARIL) 50 MG capsule Take 1 tablet every 6 hours as needed for itching 12 capsule 0    levocetirizine (XYZAL) 5 MG tablet Take 1 tablet (5 mg) by mouth every evening 30 tablet 1    meloxicam (MOBIC) 15 MG tablet TAKE 1 TABLET(15 MG) BY MOUTH DAILY 30 tablet 3    mometasone (ELOCON) 0.1 % external ointment Apply topically daily For up to 14 days 45 g 0    mometasone (NASONEX) 50 MCG/ACT nasal spray Spray 2 sprays into both nostrils daily (Patient taking differently: Spray 2 sprays into both  nostrils daily as needed) 17 g 1    montelukast (SINGULAIR) 10 MG tablet Take 1 tablet (10 mg) by mouth At Bedtime (Patient taking differently: Take 10 mg by mouth nightly as needed) 30 tablet 1    naloxone (NARCAN) 4 MG/0.1ML nasal spray Spray 1 spray (4 mg) into one nostril alternating nostrils as needed for opioid reversal every 2-3 minutes until assistance arrives 0.2 mL 0    neomycin-polymixin-dexamethasone (MAXITROL) 0.1 % ophthalmic suspension 2 drops in affected eye 4 times daily 5 mL 0    omeprazole (PRILOSEC) 40 MG DR capsule TAKE 1 CAPSULE(40 MG) BY MOUTH DAILY 90 capsule 3    ondansetron (ZOFRAN) 8 MG tablet TAKE 1 TABLET(8 MG) BY MOUTH EVERY 8 HOURS AS NEEDED FOR NAUSEA 20 tablet 0    polyethylene glycol (MIRALAX) 17 GM/Dose powder Take 17 g (1 capful) by mouth daily (Patient taking differently: Take 1 capful. by mouth daily as needed) 500 g 0    rizatriptan (MAXALT) 10 MG tablet Take 1 tablet (10 mg) by mouth at onset of headache for migraine May repeat in 2 hours. Max 3 tablets/24 hours. 18 tablet 1    scopolamine (TRANSDERM) 1 MG/3DAYS 72 hr patch Place 1 patch onto the skin every 72 hours 4 patch 2    SUMAtriptan (IMITREX) 100 MG tablet Take 1 tablet (100 mg) by mouth at onset of headache for migraine that occur at night.  Take rizatriptan for migraines during the day. 9 tablet 3    triamcinolone (KENALOG) 0.1 % external cream APPLY SPARINGLY TOPICALLY TO THE AFFECTED AREA THREE TIMES DAILY FOR 14 DAYS 30 g 0    acetaminophen (TYLENOL) 325 MG tablet Take 3 tablets (975 mg) by mouth every 6 hours as needed for mild pain 50 tablet 0    ondansetron (ZOFRAN) 8 MG tablet TAKE 1 TABLET(8 MG) BY MOUTH EVERY 6-8 HOURS AS NEEDED FOR NAUSEA (Patient not taking: Reported on 8/4/2023) 20 tablet 3     Social History     Tobacco Use    Smoking status: Never    Smokeless tobacco: Never   Substance Use Topics    Alcohol use: Yes     Comment: wine 1-2 glasses on weekends       ROS:  Review of systems negative except  "as stated above.    EXAM:   BP (!) 149/80   Pulse 64   Temp 97.6  F (36.4  C) (Tympanic)   Resp 14   Ht 1.626 m (5' 4\")   Wt 60.8 kg (134 lb)   LMP 05/12/2023   SpO2 100%   BMI 23.00 kg/m    M/S Exam:left foot - big toe with erythema, tenderness and swelling at base of nailbed extending to top of toe, no purulent pus pocket, nail is painted.  ROM intact, no tenderness on phalanx  GENERAL APPEARANCE: healthy, alert and no distress  EXTREMITIES: peripheral pulses normal  PSYCH:alert, affect bright    X-RAY was not done.    ASSESSMENT/PLAN:  (L08.9) Toe infection  (primary encounter diagnosis)  Comment: left big toe  Plan: sulfamethoxazole-trimethoprim (BACTRIM DS)         800-160 MG tablet            Reassurance given, reviewed that has skin infection of left big toe, does not need I&D as no purulent pus pocket and that infection has gone into tissue.  Discussed symptomatic treatment with tylenol, ibuprofen, warm compress/foot soak.  RX Bactrim DS given for treatment of infection.  Encourage to refrain from further attempts to poke area.    Follow up with primary provider if no improvement of symptoms within 1 week    Shankar Torres MD  August 4, 2023 11:36 AM            "

## 2023-08-09 ENCOUNTER — OFFICE VISIT (OUTPATIENT)
Dept: FAMILY MEDICINE | Facility: CLINIC | Age: 48
End: 2023-08-09
Payer: MEDICARE

## 2023-08-09 VITALS
DIASTOLIC BLOOD PRESSURE: 77 MMHG | BODY MASS INDEX: 22.83 KG/M2 | HEART RATE: 60 BPM | WEIGHT: 133 LBS | TEMPERATURE: 97.7 F | SYSTOLIC BLOOD PRESSURE: 121 MMHG | RESPIRATION RATE: 18 BRPM | OXYGEN SATURATION: 98 %

## 2023-08-09 DIAGNOSIS — Z12.31 VISIT FOR SCREENING MAMMOGRAM: ICD-10-CM

## 2023-08-09 DIAGNOSIS — G43.009 MIGRAINE WITHOUT AURA AND WITHOUT STATUS MIGRAINOSUS, NOT INTRACTABLE: ICD-10-CM

## 2023-08-09 DIAGNOSIS — Z87.898 HISTORY OF MOTION SICKNESS: ICD-10-CM

## 2023-08-09 DIAGNOSIS — B37.31 YEAST INFECTION OF THE VAGINA: Primary | ICD-10-CM

## 2023-08-09 DIAGNOSIS — R11.0 NAUSEA: ICD-10-CM

## 2023-08-09 PROCEDURE — T1013 SIGN LANG/ORAL INTERPRETER: HCPCS | Mod: U3

## 2023-08-09 PROCEDURE — 99214 OFFICE O/P EST MOD 30 MIN: CPT | Performed by: FAMILY MEDICINE

## 2023-08-09 RX ORDER — RIZATRIPTAN BENZOATE 10 MG/1
10 TABLET ORAL
Qty: 18 TABLET | Refills: 1 | Status: SHIPPED | OUTPATIENT
Start: 2023-08-09

## 2023-08-09 RX ORDER — RIZATRIPTAN BENZOATE 10 MG/1
10 TABLET ORAL
Qty: 18 TABLET | Refills: 1 | Status: SHIPPED | OUTPATIENT
Start: 2023-08-09 | End: 2023-08-09

## 2023-08-09 RX ORDER — FLUCONAZOLE 150 MG/1
150 TABLET ORAL ONCE
Qty: 1 TABLET | Refills: 0 | Status: SHIPPED | OUTPATIENT
Start: 2023-08-09 | End: 2023-08-09

## 2023-08-09 ASSESSMENT — ENCOUNTER SYMPTOMS
COUGH: 0
ARTHRALGIAS: 0
WEAKNESS: 0
HEMATURIA: 0
BREAST MASS: 0
DIARRHEA: 0
CHILLS: 0
HEADACHES: 1
EYE PAIN: 0
CONSTIPATION: 1
JOINT SWELLING: 0
ABDOMINAL PAIN: 0
SORE THROAT: 0
HEARTBURN: 0
MYALGIAS: 0
FREQUENCY: 0
FEVER: 0
SHORTNESS OF BREATH: 0
PALPITATIONS: 0
NAUSEA: 1
DIZZINESS: 0
NERVOUS/ANXIOUS: 0
PARESTHESIAS: 0
DYSURIA: 0
HEMATOCHEZIA: 0

## 2023-08-09 ASSESSMENT — ACTIVITIES OF DAILY LIVING (ADL): CURRENT_FUNCTION: NO ASSISTANCE NEEDED

## 2023-08-09 NOTE — PROGRESS NOTES
"  Assessment & Plan     Yeast infection of the vagina  Will treat with oral diflucan, follow up if not improving.  - fluconazole (DIFLUCAN) 150 MG tablet; Take 1 tablet (150 mg) by mouth once for 1 dose    History of motion sickness  Put scopolamine patch on allergy list.    Nausea  Resolved, recommended ginger lozenges or chews, consider vitamin B6, which can help sometimes.  Follow up as needed.    Migraine without aura and without status migrainosus, not intractable  Refill for PRN use  - rizatriptan (MAXALT) 10 MG tablet; Take 1 tablet (10 mg) by mouth at onset of headache for migraine May repeat in 2 hours. Max 3 tablets/24 hours.    Visit for screening mammogram  - MA Screen Bilateral w/Osman; Future      20 minutes spent by me on the date of the encounter doing chart review, history and exam, documentation and further activities per the note       See Patient Instructions    Jocy Gimenez MD  [facility] GEGE Cochran is a 47 year old, presenting for the following health issues:  Wellness Visit        8/9/2023     8:36 AM   Additional Questions   Roomed by Julio César LAMA CMA       Healthy Habits:     In general, how would you rate your overall health?  Good    Frequency of exercise:  6-7 days/week    Duration of exercise:  30-45 minutes    Do you usually eat at least 4 servings of fruit and vegetables a day, include whole grains    & fiber and avoid regularly eating high fat or \"junk\" foods?  Yes    Taking medications regularly:  Yes    Medication side effects:  Other    Ability to successfully perform activities of daily living:  No assistance needed    Home Safety:  No safety concerns identified    Hearing Impairment:  No hearing concerns    In the past 6 months, have you been bothered by leaking of urine?  No    In general, how would you rate your overall mental or emotional health?  Good    Additional concerns today:  No     Patient is here for medications check and some " other concerns not for annual wellness visit, patient stated that the [facility] scheduling wording was wrong and wasn't able to schedule a follow-up or medications check    Concern -  motion sickness/ yeast infection and constipation  Onset: in June  Description: patient went on a cruise and was prescribed nausea medication, and made her constipated, had a toe infection and got antibiotic prescribed and gave her yeast infection.  Progression of Symptoms: slowly  improving  Accompanying Signs & Symptoms: nausea, yeast infection from antibiotic, constipation     Therapies tried and outcome: ondansetron (ZOFRAN) 8 MG tablet, works for nausea but cause constipation.    Medication Follow up/ renew medications  Taking Medication as prescribed: yes  Side Effects:  constipations  Medication Helping Symptoms:  yes    Took antibiotics (Bactrim) for infection, got yeast infection.  Tried scopolamine patch for motion sickness, which gave her severe headache.  Tried zofran for nausea, which caused constipation.  Nausea resolved, constipation resolved but took her 8 days.  She gets nauseated often, not sure why.    Review of Systems   Constitutional:  Negative for chills and fever.   HENT:  Negative for congestion, ear pain, hearing loss and sore throat.    Eyes:  Positive for visual disturbance. Negative for pain.   Respiratory:  Negative for cough and shortness of breath.    Cardiovascular:  Negative for chest pain, palpitations and peripheral edema.   Gastrointestinal:  Positive for constipation and nausea. Negative for abdominal pain, diarrhea, heartburn and hematochezia.   Breasts:  Negative for tenderness, breast mass and discharge.   Genitourinary:  Negative for dysuria, frequency, genital sores, hematuria, pelvic pain, urgency, vaginal bleeding and vaginal discharge.   Musculoskeletal:  Negative for arthralgias, joint swelling and myalgias.   Skin:  Negative for rash.   Neurological:  Positive for headaches. Negative for  dizziness, weakness and paresthesias.   Psychiatric/Behavioral:  Negative for mood changes. The patient is not nervous/anxious.          Objective    /77 (BP Location: Right arm, Patient Position: Sitting, Cuff Size: Adult Regular)   Pulse 60   Temp 97.7  F (36.5  C) (Oral)   Resp 18   Wt 60.3 kg (133 lb)   LMP 05/12/2023   SpO2 98%   BMI 22.83 kg/m    Body mass index is 22.83 kg/m .  Physical Exam   GENERAL: healthy, alert and no distress  PSYCH: mentation appears normal, affect normal/bright

## 2023-08-14 ENCOUNTER — MYC MEDICAL ADVICE (OUTPATIENT)
Dept: FAMILY MEDICINE | Facility: CLINIC | Age: 48
End: 2023-08-14
Payer: MEDICARE

## 2023-08-14 DIAGNOSIS — L03.039 CELLULITIS OF TOE, UNSPECIFIED LATERALITY: Primary | ICD-10-CM

## 2023-08-14 RX ORDER — MUPIROCIN 20 MG/G
OINTMENT TOPICAL 3 TIMES DAILY
Qty: 30 G | Refills: 0 | Status: SHIPPED | OUTPATIENT
Start: 2023-08-14 | End: 2023-12-21

## 2023-08-23 ENCOUNTER — MYC MEDICAL ADVICE (OUTPATIENT)
Dept: FAMILY MEDICINE | Facility: CLINIC | Age: 48
End: 2023-08-23
Payer: MEDICARE

## 2023-08-23 PROBLEM — M25.521 ELBOW PAIN, RIGHT: Status: RESOLVED | Noted: 2023-01-11 | Resolved: 2023-08-23

## 2023-08-23 PROBLEM — M77.11 LATERAL EPICONDYLITIS OF RIGHT ELBOW: Status: RESOLVED | Noted: 2023-01-11 | Resolved: 2023-08-23

## 2023-08-30 ENCOUNTER — OFFICE VISIT (OUTPATIENT)
Dept: FAMILY MEDICINE | Facility: CLINIC | Age: 48
End: 2023-08-30
Payer: MEDICARE

## 2023-08-30 VITALS
WEIGHT: 130 LBS | SYSTOLIC BLOOD PRESSURE: 132 MMHG | RESPIRATION RATE: 18 BRPM | TEMPERATURE: 98 F | HEIGHT: 64 IN | HEART RATE: 59 BPM | BODY MASS INDEX: 22.2 KG/M2 | DIASTOLIC BLOOD PRESSURE: 84 MMHG | OXYGEN SATURATION: 98 %

## 2023-08-30 DIAGNOSIS — L03.032 INFECTION OF NAIL BED OF TOE OF LEFT FOOT: Primary | ICD-10-CM

## 2023-08-30 DIAGNOSIS — B37.31 YEAST INFECTION OF THE VAGINA: ICD-10-CM

## 2023-08-30 PROCEDURE — T1013 SIGN LANG/ORAL INTERPRETER: HCPCS | Mod: U3

## 2023-08-30 PROCEDURE — 99213 OFFICE O/P EST LOW 20 MIN: CPT

## 2023-08-30 RX ORDER — DOXYCYCLINE 100 MG/1
100 CAPSULE ORAL 2 TIMES DAILY
Qty: 14 CAPSULE | Refills: 0 | Status: SHIPPED | OUTPATIENT
Start: 2023-08-30 | End: 2023-09-06

## 2023-08-30 RX ORDER — FLUCONAZOLE 150 MG/1
150 TABLET ORAL
Qty: 3 TABLET | Refills: 0 | Status: SHIPPED | OUTPATIENT
Start: 2023-08-30 | End: 2023-09-06

## 2023-08-30 NOTE — LETTER
August 30, 2023      Sammie Ramirez  [address] [address]        To Whom It May Concern:    Sammie Ramirez was seen in our clinic. She may return to work with the following: limited to light duty - standing limited to 1-2 hrs and walking limited 1-2 hrs, affected area must be kept clean and dry, and must be able to take a break for 5 mins every 1-2 hrs to move position on or about 8/30/2023.      Sincerely,        JORGE Juárez CNP

## 2023-08-30 NOTE — PATIENT INSTRUCTIONS
Doxycycline for 7 days   Take Diflucan every 3 days while on Doxycycline   Continue warm soapy water soaks   Follow up if not noting any improvement

## 2023-08-30 NOTE — PROGRESS NOTES
Assessment & Plan     (L03.032) Infection of nail bed of toe of left foot  (primary encounter diagnosis)  Comment: will treat w/ doxycycline. Suspect ongoing paronychia given did not finish out Bactrim prescription. Discussed medication risks and benefits of Doxycycline with patient in detail with patient verbal understanding. Routine foot soaks, keep foot clean. Return to work w/ restriction noted provided. Patient fully understands and is agreeable with plan of care, at this point patient will follow up as needed unless acute concerns arise in the meantime.  Plan: doxycycline hyclate (VIBRAMYCIN) 100 MG capsule    (B37.31) Yeast infection of the vagina  Comment: will provide patient Diflucan today to prophylactically treat for yeast infection given recent abx induced yeast infection. Discussed medication risks and benefits of Diflucan with patient in detail with patient verbal understanding.   Plan: fluconazole (DIFLUCAN) 150 MG tablet    27 minutes spent by me on the date of the encounter doing chart review, history and exam, documentation and further activities per the note       Follow up if not noting any improvement    JORGE Juárez [facility]    Jim Cochran is a 47 year old, presenting for the following health issues:  Musculoskeletal Problem (Pt was seen on 8/4/23 in  for her Left big toe swelling. Since then she did an evist with Dr. Rita Gimenez and was prescribed an ointment but states that only made things worse.)        8/30/2023     1:42 PM   Additional Questions   Roomed by Vivian Steiner       HPI     Pain History:  When did you first notice your pain? Beginning of August   Have you seen anyone else for your pain? Yes - urgent care and April coming hay  How has your pain affected your ability to work? 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 Musculoskeletal  "problem/pain  Onset/Duration: Beginning of August   Description  Location: big toe - left  Joint Swelling: YES  Redness: YES  Pain: YES  Warmth: YES  Intensity:  moderate, severe  Progression of Symptoms:  worsening  Accompanying signs and symptoms:   Fevers: No  Numbness/tingling/weakness: YES  History  Trauma to the area: No  Recent illness:  No  Previous similar problem: No  Previous evaluation:  No  Precipitating or alleviating factors:  Aggravating factors include: rivera its the same  Therapies tried and outcome: Mupriocin ointment 3x daily says it made it worse      Review of Systems   Constitutional, HEENT, cardiovascular, pulmonary, GI, , musculoskeletal, neuro, skin, endocrine and psych systems are negative, except as otherwise noted.      Objective    /84 (BP Location: Right arm, Patient Position: Sitting, Cuff Size: Adult Regular)   Pulse 59   Temp 98  F (36.7  C)   Resp 18   Ht 1.626 m (5' 4\")   Wt 59 kg (130 lb)   LMP 05/12/2023   SpO2 98%   BMI 22.31 kg/m    Body mass index is 22.31 kg/m .  Physical Exam  Vitals and nursing note reviewed.   Constitutional:       General: She is not in acute distress.     Appearance: Normal appearance. She is not ill-appearing.   Cardiovascular:      Rate and Rhythm: Normal rate.   Pulmonary:      Effort: Pulmonary effort is normal.   Skin:     Findings: Erythema present.      Comments: Erythematous, swollen Left toe. Some purulent drainage able to be extracted w/ palpation, no notable pus pocket. See picture for details.    Neurological:      Mental Status: She is alert.   Psychiatric:         Mood and Affect: Mood normal.         Behavior: Behavior normal.         Thought Content: Thought content normal.         Judgment: Judgment normal.         Left great toe              "

## 2023-09-15 ENCOUNTER — TELEPHONE (OUTPATIENT)
Dept: GASTROENTEROLOGY | Facility: CLINIC | Age: 48
End: 2023-09-15
Payer: MEDICARE

## 2023-09-15 NOTE — TELEPHONE ENCOUNTER
Caller: Sammie Ramirez   Reason for Reschedule/Cancellation (please be detailed, any staff messages or encounters to note?): has a migraine and will call back in the winter to reschedule into the spring time      Prior to reschedule please review:  Ordering Provider: Coming Hay  Sedation per order: moderate  Does patient have any ASC Exclusions, please identify?: n      Notes on Cancelled Procedure:  Procedure: Lower Endoscopy [Colonoscopy]   Date: 9/18/23  Location: [facility]; [address] [place] [address]  Surgeon: Randal      Rescheduled: No

## 2023-10-02 ENCOUNTER — TRANSFERRED RECORDS (OUTPATIENT)
Dept: HEALTH INFORMATION MANAGEMENT | Facility: CLINIC | Age: 48
End: 2023-10-02
Payer: MEDICARE

## 2023-10-05 ENCOUNTER — OFFICE VISIT (OUTPATIENT)
Dept: OBGYN | Facility: CLINIC | Age: 48
End: 2023-10-05
Payer: MEDICARE

## 2023-10-05 VITALS
DIASTOLIC BLOOD PRESSURE: 60 MMHG | HEIGHT: 64 IN | BODY MASS INDEX: 22.65 KG/M2 | WEIGHT: 132.7 LBS | SYSTOLIC BLOOD PRESSURE: 110 MMHG

## 2023-10-05 DIAGNOSIS — Z01.419 ENCOUNTER FOR GYNECOLOGICAL EXAMINATION (GENERAL) (ROUTINE) WITHOUT ABNORMAL FINDINGS: ICD-10-CM

## 2023-10-05 DIAGNOSIS — N92.0 MENORRHAGIA WITH REGULAR CYCLE: ICD-10-CM

## 2023-10-05 DIAGNOSIS — N80.9 ENDOMETRIOSIS: ICD-10-CM

## 2023-10-05 DIAGNOSIS — Z30.011 ENCOUNTER FOR INITIAL PRESCRIPTION OF CONTRACEPTIVE PILLS: ICD-10-CM

## 2023-10-05 DIAGNOSIS — Z13.9 SCREENING FOR CONDITION: ICD-10-CM

## 2023-10-05 DIAGNOSIS — Z98.890 S/P LEEP OF CERVIX: Primary | ICD-10-CM

## 2023-10-05 DIAGNOSIS — R10.2 PELVIC PAIN: ICD-10-CM

## 2023-10-05 DIAGNOSIS — Z12.4 CERVICAL CANCER SCREENING: ICD-10-CM

## 2023-10-05 DIAGNOSIS — N95.1 PERIMENOPAUSE: ICD-10-CM

## 2023-10-05 DIAGNOSIS — R10.2 PELVIC PAIN IN FEMALE: ICD-10-CM

## 2023-10-05 PROCEDURE — 87624 HPV HI-RISK TYP POOLED RSLT: CPT | Performed by: FAMILY MEDICINE

## 2023-10-05 PROCEDURE — 99396 PREV VISIT EST AGE 40-64: CPT | Performed by: FAMILY MEDICINE

## 2023-10-05 PROCEDURE — G0145 SCR C/V CYTO,THINLAYER,RESCR: HCPCS | Performed by: FAMILY MEDICINE

## 2023-10-05 PROCEDURE — 99213 OFFICE O/P EST LOW 20 MIN: CPT | Mod: 25 | Performed by: FAMILY MEDICINE

## 2023-10-05 PROCEDURE — T1013 SIGN LANG/ORAL INTERPRETER: HCPCS | Mod: U3 | Performed by: FAMILY MEDICINE

## 2023-10-05 PROCEDURE — G0124 SCREEN C/V THIN LAYER BY MD: HCPCS

## 2023-10-05 RX ORDER — LEVONORGESTREL AND ETHINYL ESTRADIOL 90; 20 UG/1; UG/1
1 TABLET ORAL DAILY
Qty: 90 TABLET | Refills: 3 | Status: SHIPPED | OUTPATIENT
Start: 2023-10-05 | End: 2023-11-22

## 2023-10-05 NOTE — PROGRESS NOTES
SUBJECTIVE:  Sammie Ramirez is an 47 year old  woman who presents for   annual gyn exam. Patient's last menstrual period was 2023. Periods are regular q 28-30 days, lasting   4 days. Dysmenorrhea:mild, occurring premenstrually and first 1-2 days of flow. Cyclic symptoms   include headaches occurring frequently almost daily, hot flashes occurring, . No intermenstrual bleeding,   spotting, or discharge.  Menarche age teen.  STD hx: no.    Today she wonders about pelvic pain, still having pain on the left side    Current contraception: none  BINA exposure: no  History of abnormal Pap smear: Yes: hx of LEEP, now normal   Family history of uterine or ovarian cancer: No  Regular self breast exam: Yes  History of abnormal mammogram: No  Family history of breast cancer: Mother with stage 3 age 66, MGM   History of abnormal lipids: No       Past Medical History:   Diagnosis Date    ASCUS (atypical squamous cells of undetermined significance) on Pap smear 10/13/2016    Neg HPV    History of colposcopy 2018    LSIL (low grade squamous intraepithelial lesion) on Pap smear 2014        Family History   Problem Relation Age of Onset    Breast Cancer Mother     Family History Negative Mother     Family History Negative Father     Prostate Cancer Father     Liver Cancer Father     Breast Cancer Maternal Grandmother     Myocardial Infarction Paternal Grandmother     Myocardial Infarction Paternal Grandfather        Past Surgical History:   Procedure Laterality Date    DAVINCI PELVIC PROCEDURE Right 2022    Procedure: Xi Robotic  endometriosis resection/fulguration, cervical biopsy, and endometrial biopsy;  Surgeon: Krystin Sen DO;  Location: [facility] OR    LEEP TX, CERVICAL  x 2 per chart    historical    ORTHOPEDIC SURGERY         Current Outpatient Medications   Medication    hydrocortisone, Perianal, (ANUSOL-HC) 2.5 % cream    hydrOXYzine (VISTARIL) 50 MG capsule    levocetirizine  "(XYZAL) 5 MG tablet    meloxicam (MOBIC) 15 MG tablet    mometasone (ELOCON) 0.1 % external ointment    mometasone (NASONEX) 50 MCG/ACT nasal spray    montelukast (SINGULAIR) 10 MG tablet    mupirocin (BACTROBAN) 2 % external ointment    omeprazole (PRILOSEC) 40 MG DR capsule    ondansetron (ZOFRAN) 8 MG tablet    polyethylene glycol (MIRALAX) 17 GM/Dose powder    rizatriptan (MAXALT) 10 MG tablet    SUMAtriptan (IMITREX) 100 MG tablet    triamcinolone (KENALOG) 0.1 % external cream    naloxone (NARCAN) 4 MG/0.1ML nasal spray     No current facility-administered medications for this visit.     Allergies   Allergen Reactions    Amoxicillin      Rash, hives    Co-Trimoxazole [Sulfamethoxazole-Trimethoprim] Other (See Comments)     Yeast infection when taking this medication    Codeine Sulfate Nausea and Vomiting    Hydrocodone Nausea and Vomiting    Hydromorphone Itching     Light reaction, not severe    Lactose Diarrhea     dairy    Scopolamine Headache     Severe headache    Seasonal Allergies     Tramadol Headache and Nausea     Unsure if reaction was due to medication or COVID vaccine       Social History     Tobacco Use    Smoking status: Never    Smokeless tobacco: Never   Substance Use Topics    Alcohol use: Yes     Comment: wine 1-2 glasses on weekends       Review Of Systems  Ears/Nose/Throat: negative  Respiratory: No shortness of breath, dyspnea on exertion, cough, or hemoptysis  Cardiovascular: negative  Gastrointestinal: negative  Genitourinary: See HPI   Constitutional, HEENT, cardiovascular, pulmonary, GI, , musculoskeletal, neuro, skin, endocrine and psych systems are negative, except as otherwise noted.      OBJECTIVE:  /60   Ht 1.626 m (5' 4\")   Wt 60.2 kg (132 lb 11.2 oz)   LMP 05/12/2023   BMI 22.78 kg/m      General appearance: healthy, alert and no distress  Skin: Skin color, texture, turgor normal. No rashes or lesions.  Ears: negative  Nose/Sinuses: Nares normal. Septum midline. " Mucosa normal. No drainage or sinus tenderness.  Oropharynx: Lips, mucosa, and tongue normal. Teeth and gums normal.  Neck: Neck supple. No adenopathy. Thyroid symmetric, normal size,, Carotids without bruits.  Lungs: negative, Percussion normal. Good diaphragmatic excursion. Lungs clear  Heart: negative, PMI normal. No lifts, heaves, or thrills. RRR. No murmurs, clicks gallops or rub  Breasts: Inspection negative. No nipple discharge or bleeding. No masses.  Abdomen: Abdomen soft, non-tender. BS normal. No masses, organomegaly  Pelvic: Pelvic:  Pelvic examination with  pap/no Gonorrhea and Chlamydia   including  External genitalia normal   and vagina normal rugatted not atrophic  Examination of urethra  normal no masses, tenderness, scarring  bladder, no masses or tenderness  Cervix  no lesions or discharge    Bimanual exam with   Uterus 7 weeks size, mid position, mobile,slight left sided-tenderness, no descent   Adnexa/parametria  normal no masses         ASSESSMENT:  Sammie Ramirez is an 47 year old  woman who presents for   annual gyn exam. Concerns:  menorrhagia, pelvic pain, endometriosis     PLAN:  Dx:  1)  Pap smear today  Gonorrhea  Chlamydia declines  2)  Mammography ordered, lipids at appropriate intervals ordered   Colonoscopy due ordered, planning in the spring   3)  Perimenopause:  hot flashes increased and bloating increased, menorrhagia has occurred, normal endometrial biopsy 2022, was told the tendonitis in her right tendon was part of perimenopause, Was told this can happen in perimenopause  Discussed hormonal and selective serotonin reuptake inhibitor control   She would like Oral Contraceptive   4)  Endometriosis:  s/p laparoscopy 22, pelvic pain on the left side continuing, better for a year, and then the sharp pain came back, occurred once with her period, but was very brief, lasting only a few seconds.  Discussed hormonal control and indications for hysterectomy, since she has  completed childbearing, risks, benefit and alternatives discussed today  Discussed pelvic ultrasound and she would like this.   She would like to start Oral Contraceptive   5)  Right tendon pain: occurring after the laparoscopy  6) covid vaccination booster discussed, she did the first two and declines the booster today       PE:  Reviewed health maintenance including diet, regular exercise   and periodic exams.    Dr. Krystin Sen, DO    Obstetrics and Gynecology  [facility] [place] [facility] [place]

## 2023-10-05 NOTE — PATIENT INSTRUCTIONS
For fasting labs (for cholesterol):  please Call Lab [phone] [place] or [phone] [place] to schedule labs on a future day   You [facility] also [facility]    Or if you prefer you can get non-fasting cholesterol and all the labs today    To schedule mammogram they will call you, or you can call [phone] to schedule it!    Dr. Krystin Sen, DO    Obstetrics and Gynecology  [facility] [place] and [place]

## 2023-10-05 NOTE — NURSING NOTE
"Chief Complaint   Patient presents with    Gyn Exam     Pap due       Initial /60   Ht 1.626 m (5' 4\")   Wt 60.2 kg (132 lb 11.2 oz)   LMP 2023   BMI 22.78 kg/m   Estimated body mass index is 22.78 kg/m  as calculated from the following:    Height as of this encounter: 1.626 m (5' 4\").    Weight as of this encounter: 60.2 kg (132 lb 11.2 oz).  BP completed using cuff size: regular    Questioned patient about current smoking habits.  Pt. has never smoked.          The following HM Due: pap smear    "

## 2023-10-11 LAB
BKR LAB AP GYN ADEQUACY: ABNORMAL
BKR LAB AP GYN INTERPRETATION: ABNORMAL
BKR LAB AP HPV REFLEX: ABNORMAL
BKR LAB AP PREVIOUS ABNL DX: ABNORMAL
BKR LAB AP PREVIOUS ABNORMAL: ABNORMAL
PATH REPORT.COMMENTS IMP SPEC: ABNORMAL
PATH REPORT.COMMENTS IMP SPEC: ABNORMAL
PATH REPORT.RELEVANT HX SPEC: ABNORMAL

## 2023-10-13 ENCOUNTER — PATIENT OUTREACH (OUTPATIENT)
Dept: OBGYN | Facility: CLINIC | Age: 48
End: 2023-10-13
Payer: MEDICARE

## 2023-10-13 DIAGNOSIS — Z98.890 S/P LEEP OF CERVIX: Primary | ICD-10-CM

## 2023-10-20 ENCOUNTER — MYC MEDICAL ADVICE (OUTPATIENT)
Dept: GASTROENTEROLOGY | Facility: CLINIC | Age: 48
End: 2023-10-20

## 2023-10-20 DIAGNOSIS — B37.31 YEAST VAGINITIS: ICD-10-CM

## 2023-10-25 ENCOUNTER — OFFICE VISIT (OUTPATIENT)
Dept: FAMILY MEDICINE | Facility: CLINIC | Age: 48
End: 2023-10-25
Payer: MEDICARE

## 2023-10-25 VITALS
TEMPERATURE: 98.7 F | OXYGEN SATURATION: 99 % | DIASTOLIC BLOOD PRESSURE: 88 MMHG | BODY MASS INDEX: 22.72 KG/M2 | WEIGHT: 133.1 LBS | SYSTOLIC BLOOD PRESSURE: 153 MMHG | HEART RATE: 72 BPM | HEIGHT: 64 IN | RESPIRATION RATE: 17 BRPM

## 2023-10-25 DIAGNOSIS — R09.81 CONGESTION OF PARANASAL SINUS: ICD-10-CM

## 2023-10-25 DIAGNOSIS — J06.9 UPPER RESPIRATORY TRACT INFECTION, UNSPECIFIED TYPE: Primary | ICD-10-CM

## 2023-10-25 PROCEDURE — 99213 OFFICE O/P EST LOW 20 MIN: CPT

## 2023-10-25 NOTE — PROGRESS NOTES
Assessment & Plan     (J06.9) Upper respiratory tract infection, unspecified type  (primary encounter diagnosis)  (R09.81) Congestion of paranasal sinus  Comment: reassured by exam and that historically patient improving. Discussed and would like patient to continue treating symptoms as is w/ OTC medications. Did not notice any airway compromise w/ patient c/o of throat swelling, suggest antihistamine use for the next few weeks. Can use benadryl at night, add zyrtec during day if needed. Patient congestion to be treated w/ Flonase. Discussed having patient follow up if noting worsening symptoms again or if symptoms do not continue to improve. At that point would consider abx. Patient fully understands and is agreeable with plan of care, at this point patient will follow up as needed unless acute concerns arise in the meantime.  Plan: as above.    27 minutes spent by me on the date of the encounter doing chart review, history and exam, documentation and further activities per the note       Follow up: As above.     JORGE Juárez [facility]    Jim Cochran is a 47 year old, presenting for the following health issues:  URI        10/25/2023    11:00 AM   Additional Questions   Roomed by Sara MONTGOMERY       Acute Illness  Acute illness concerns: continued symptoms.  Onset/Duration: 3 weeks ago  Symptoms:  Fever: YES- on and off, unsure of last fever hasn't checked.   Chills/Sweats: YES  Headache (location?): No  Sinus Pressure: YES  Conjunctivitis:  dry eyes  Ear Pain: no  Rhinorrhea: YES  Congestion: YES  Sore Throat: feels swollen but not sore  Cough: YES-non-productive  Wheeze: No  Decreased Appetite: YES  Nausea: No  Vomiting: YES  Diarrhea: No  Dysuria/Freq.: No  Dysuria or Hematuria: No  Fatigue/Achiness: YES  Sick/Strep Exposure: No  Therapies tried and outcome: tylenol, OTC cough med     -Overall improving, however slowly  -Still quite fatigued   -Wants to  "return to work but maybe w/ light duty/half days to start and return to work w/o restrictions next week.    Review of Systems   Constitutional, HEENT, cardiovascular, pulmonary, gi and gu systems are negative, except as otherwise noted.      Objective    BP (!) 153/88 (BP Location: Right arm, Patient Position: Sitting, Cuff Size: Adult Regular)   Pulse 72   Temp 98.7  F (37.1  C) (Oral)   Resp 17   Ht 1.626 m (5' 4\")   Wt 60.4 kg (133 lb 1.6 oz)   LMP 05/12/2023   SpO2 99%   BMI 22.85 kg/m    Body mass index is 22.85 kg/m .  Physical Exam  Vitals and nursing note reviewed.   Constitutional:       General: She is not in acute distress.     Appearance: Normal appearance. She is not ill-appearing.   HENT:      Right Ear: Tympanic membrane, ear canal and external ear normal. There is no impacted cerumen.      Left Ear: Tympanic membrane, ear canal and external ear normal. There is no impacted cerumen.      Nose: Congestion present. No rhinorrhea.      Mouth/Throat:      Mouth: Mucous membranes are moist.      Pharynx: No oropharyngeal exudate or posterior oropharyngeal erythema.   Eyes:      General:         Right eye: No discharge.         Left eye: No discharge.      Conjunctiva/sclera: Conjunctivae normal.      Pupils: Pupils are equal, round, and reactive to light.   Cardiovascular:      Rate and Rhythm: Normal rate and regular rhythm.      Heart sounds: No murmur heard.     No friction rub. No gallop.   Pulmonary:      Effort: No respiratory distress.      Breath sounds: Normal breath sounds. No stridor. No wheezing, rhonchi or rales.      Comments: Mildly congested cough noted during exam.   Musculoskeletal:      Cervical back: No tenderness.   Lymphadenopathy:      Cervical: No cervical adenopathy.   Skin:     General: Skin is warm and dry.   Neurological:      Mental Status: She is alert.   Psychiatric:         Mood and Affect: Mood normal.         Behavior: Behavior normal.         Thought Content: " Thought content normal.         Judgment: Judgment normal.

## 2023-10-25 NOTE — LETTER
October 25, 2023      Sammie Ramirez  [address] [address]        To Whom It May Concern:    Sammie Ramirez was seen in our clinic. Please excuse Sammie from work d/t illness from 10/23/2023 through 10/25/2023. She may return to work with the following: limited to light duty - start w/ half days for the rest of the week through 10/27/2023 on or about 10/26/2023.      Sincerely,        JORGE Juárez CNP

## 2023-11-01 ENCOUNTER — ANCILLARY PROCEDURE (OUTPATIENT)
Dept: ULTRASOUND IMAGING | Facility: CLINIC | Age: 48
End: 2023-11-01
Attending: FAMILY MEDICINE
Payer: MEDICARE

## 2023-11-01 DIAGNOSIS — Z01.419 ENCOUNTER FOR GYNECOLOGICAL EXAMINATION (GENERAL) (ROUTINE) WITHOUT ABNORMAL FINDINGS: ICD-10-CM

## 2023-11-01 DIAGNOSIS — R10.2 PELVIC PAIN: ICD-10-CM

## 2023-11-01 DIAGNOSIS — Z13.9 SCREENING FOR CONDITION: ICD-10-CM

## 2023-11-01 PROCEDURE — 76856 US EXAM PELVIC COMPLETE: CPT | Performed by: FAMILY MEDICINE

## 2023-11-01 PROCEDURE — T1013 SIGN LANG/ORAL INTERPRETER: HCPCS | Mod: U3 | Performed by: FAMILY MEDICINE

## 2023-11-01 PROCEDURE — 76830 TRANSVAGINAL US NON-OB: CPT | Performed by: FAMILY MEDICINE

## 2023-11-02 ENCOUNTER — TELEPHONE (OUTPATIENT)
Dept: FAMILY MEDICINE | Facility: CLINIC | Age: 48
End: 2023-11-02
Payer: MEDICARE

## 2023-11-02 ENCOUNTER — MYC MEDICAL ADVICE (OUTPATIENT)
Dept: FAMILY MEDICINE | Facility: CLINIC | Age: 48
End: 2023-11-02
Payer: MEDICARE

## 2023-11-02 ENCOUNTER — NURSE TRIAGE (OUTPATIENT)
Dept: NURSING | Facility: CLINIC | Age: 48
End: 2023-11-02
Payer: MEDICARE

## 2023-11-02 NOTE — TELEPHONE ENCOUNTER
Patient Returning Call    Reason for call:  Patient would like a return call in regards to her apt she had on 10-25-23.  She continues to have issues and was told if symptoms didn't approve that a medication would be prescribed and she would like to discuss this with her care team.      Information relayed to patient:      Patient has additional questions:        Could we send this information to you in [facility] or would you prefer to receive a phone call?:   Patient would prefer a phone call   Okay to leave a detailed message?:  at Home number on file [phone] (home)

## 2023-11-02 NOTE — TELEPHONE ENCOUNTER
Pt is phoning wanting an antibiotic for what pt is thinking is a sinus infection - Pt reached out to her provider via MyChart and providers nurse messaged that pt needs to be seen by either [facility] or Virtual Urgent Care Visit. Pt states that she does not have the time for a visit and declined scheduling when along with triage.    No triage       Estella Haramn RN  [facility] Nurse Advisor  11:43 AM 11/2/2023    Reason for Disposition   General information question, no triage required and triager able to answer question    Additional Information   Negative: [1] Caller is not with the adult (patient) AND [2] reporting urgent symptoms   Negative: Lab result questions   Negative: Medication questions   Negative: Caller can't be reached by phone   Negative: Caller has already spoken to PCP or another triager   Negative: RN needs further essential information from caller in order to complete triage   Negative: Requesting regular office appointment   Negative: [1] Caller requesting NON-URGENT health information AND [2] PCP's office is the best resource   Negative: Health Information question, no triage required and triager able to answer question    Protocols used: Information Only Call - No Triage-A-

## 2023-11-02 NOTE — TELEPHONE ENCOUNTER
Message #1 left for patient to return call to triage     RN reviewed visit from 10/25/2023 with Rojas PATEL CNP    Note states that if symptoms do not improve that a visit is needed to discuss if antibiotics warranted     Ann Wang, Registered Nurse  [facility]

## 2023-11-02 NOTE — TELEPHONE ENCOUNTER
See my chart   Per review of last office visit patient advised another appt needed if symptoms do not improve, advised to send Jazzyit     Ann Wang, Registered Nurse  [facility]

## 2023-11-03 NOTE — TELEPHONE ENCOUNTER
Patient returned call and spoke to FNA on 11/2/2023   Advised visit needed and patient declined scheduling     Ann Wang Registered Nurse  [facility]

## 2023-11-05 ENCOUNTER — HEALTH MAINTENANCE LETTER (OUTPATIENT)
Age: 48
End: 2023-11-05

## 2023-11-06 ENCOUNTER — NURSE TRIAGE (OUTPATIENT)
Dept: FAMILY MEDICINE | Facility: CLINIC | Age: 48
End: 2023-11-06

## 2023-11-06 ENCOUNTER — HOSPITAL ENCOUNTER (OUTPATIENT)
Facility: CLINIC | Age: 48
End: 2023-11-06
Attending: FAMILY MEDICINE | Admitting: FAMILY MEDICINE
Payer: MEDICARE

## 2023-11-06 ENCOUNTER — VIRTUAL VISIT (OUTPATIENT)
Dept: OBGYN | Facility: CLINIC | Age: 48
End: 2023-11-06
Payer: MEDICARE

## 2023-11-06 DIAGNOSIS — R93.89 ENDOMETRIAL THICKENING ON ULTRASOUND: ICD-10-CM

## 2023-11-06 DIAGNOSIS — R10.2 PELVIC PAIN: Primary | ICD-10-CM

## 2023-11-06 PROCEDURE — 99442 PR PHYSICIAN TELEPHONE EVALUATION 11-20 MIN: CPT | Mod: 95 | Performed by: FAMILY MEDICINE

## 2023-11-06 RX ORDER — ACETAMINOPHEN AND CODEINE PHOSPHATE 120; 12 MG/5ML; MG/5ML
0.35 SOLUTION ORAL DAILY
Qty: 90 TABLET | Refills: 3 | Status: SHIPPED | OUTPATIENT
Start: 2023-11-06 | End: 2024-01-05

## 2023-11-06 NOTE — TELEPHONE ENCOUNTER
"Pt calls thru sign language interpretor    S-(situation): has toe nail issue again, discussed at length, 18 minutes    B-(background): see earlier visits for toe issue, has redness and swelling around right side of left great toe, denies fever, localized pain, denies red streaking, wants antibiotic prescribed again    A-(assessment): toe infection, recurrent    R-(recommendations): no appointments available today, pt will try E visit now, will call back if not able to figure this out, aware PCP not in clinic today, pt would like Rojas to do E Visit, will send media pic, pt appears scheduled E visit with another provider in       Reason for Disposition   Localized redness and swelling of skin around nail (i.e., cuticle area or nail fold)    Additional Information   Negative: Followed an injury   Negative: Wound looks infected   Negative: Caused by an animal bite   Negative: Caused by frostbite   Negative: Athlete's Foot suspected (i.e., itchy red rash in web space between toes)   Negative: Foot pain is main symptom   Negative: Foot is cool or blue in comparison to other foot   Negative: Purple or black skin on toe  (Exception: Person remembers bruising the toe from an injury.)   Negative: Patient sounds very sick or weak to the triager   Negative: SEVERE pain (e.g., excruciating, unable to do any normal activities) and not improved after 2 hours of pain medicine   Negative: Looks like a boil, infected sore, deep ulcer, or other infected rash (spreading redness, pus)   Negative: Yellow pus seen in skin around toenail (cuticle area), or pus seen under toenail   Negative: Numbness in one foot (i.e., loss of sensation)   Negative: MODERATE pain (e.g., interferes with normal activities, limping) and present > 3 days   Negative: Swollen joint with no fever or redness   Negative: Pain in the big toe joint    Answer Assessment - Initial Assessment Questions  1. ONSET: \"When did the pain start?\"       3 days ago   2. " "LOCATION: \"Where is the pain located?\"   (e.g., around nail, entire toe, at foot joint)       Left big toe area, only on cuticle area  3. PAIN: \"How bad is the pain?\"    (Scale 1-10; or mild, moderate, severe)    -  MILD (1-3): doesn't interfere with normal activities     -  MODERATE (4-7): interferes with normal activities (e.g., work or school) or awakens from sleep, limping     -  SEVERE (8-10): excruciating pain, unable to do any normal activities, unable to walk      Severe pain   4. APPEARANCE: \"What does the toe look like?\" (e.g., redness, swelling, bruising, pallor)      Localized area  5. CAUSE: \"What do you think is causing the toe pain?\"      Recurrent issue, paronchia  6. OTHER SYMPTOMS: \"Do you have any other symptoms?\" (e.g., leg pain, rash, fever, numbness)      Denies fever, green discharge  7. PREGNANCY: \"Is there any chance you are pregnant?\" \"When was your last menstrual period?\"      N/a    Protocols used: Toe Pain-A-[place]    Jessica King RN, BSN  [facility]    "

## 2023-11-06 NOTE — PROGRESS NOTES
SUBJECTIVE:  Start: time: 1042  End time: 1055  Time in minutes: 15 minutes   Patient presents in her home   Dr. Sen presents in her [place] office, [facility]    Sammie MOREL James is an 47 year old   woman who presents for   gynecology consult for Pelvic us showing prominent endometrial lining,   Recently started treatment for pelvic pain with Oral Contraceptive and   Now having some nausea.  Having a migraine for about 10 days with   Nausea.  This headache is different, she had reported daily headaches   When seeing me 10/5/2023, but now reports this only lasted two days,   Felt the Oral Contraceptive helped her headache and now the headache has occurred   For 10 days. Now also having some abnormal bleeding.   Previously had catamenial headaches only.        Patient's last menstrual period was 2023.       Past Medical History:   Diagnosis Date    ASCUS (atypical squamous cells of undetermined significance) on Pap smear 10/13/2016    Neg HPV    History of colposcopy 2018    LSIL (low grade squamous intraepithelial lesion) on Pap smear 2014          Family History   Problem Relation Age of Onset    Breast Cancer Mother     Family History Negative Mother     Family History Negative Father     Prostate Cancer Father     Liver Cancer Father     Breast Cancer Maternal Grandmother     Myocardial Infarction Paternal Grandmother     Myocardial Infarction Paternal Grandfather        Past Surgical History:   Procedure Laterality Date    DAVINCI PELVIC PROCEDURE Right 2022    Procedure: Xi Robotic  endometriosis resection/fulguration, cervical biopsy, and endometrial biopsy;  Surgeon: Krystin Sen DO;  Location: [facility] OR    LEEP TX, CERVICAL  x 2 per chart    historical    ORTHOPEDIC SURGERY         Current Outpatient Medications   Medication    hydrocortisone, Perianal, (ANUSOL-HC) 2.5 % cream    hydrOXYzine (VISTARIL) 50 MG capsule    levocetirizine (XYZAL) 5 MG tablet     levonorgestrel-ethinyl estrad (SIMONE) 90-20 MCG tablet    meloxicam (MOBIC) 15 MG tablet    mometasone (ELOCON) 0.1 % external ointment    mometasone (NASONEX) 50 MCG/ACT nasal spray    montelukast (SINGULAIR) 10 MG tablet    mupirocin (BACTROBAN) 2 % external ointment    naloxone (NARCAN) 4 MG/0.1ML nasal spray    omeprazole (PRILOSEC) 40 MG DR capsule    ondansetron (ZOFRAN) 8 MG tablet    polyethylene glycol (MIRALAX) 17 GM/Dose powder    rizatriptan (MAXALT) 10 MG tablet    SUMAtriptan (IMITREX) 100 MG tablet    triamcinolone (KENALOG) 0.1 % external cream     No current facility-administered medications for this visit.     Allergies   Allergen Reactions    Amoxicillin      Rash, hives    Co-Trimoxazole [Sulfamethoxazole-Trimethoprim] Other (See Comments)     Yeast infection when taking this medication    Codeine Sulfate Nausea and Vomiting    Hydrocodone Nausea and Vomiting    Hydromorphone Itching     Light reaction, not severe    Lactose Diarrhea     dairy    Scopolamine Headache     Severe headache    Seasonal Allergies     Tramadol Headache and Nausea     Unsure if reaction was due to medication or COVID vaccine       Social History     Tobacco Use    Smoking status: Never    Smokeless tobacco: Never   Substance Use Topics    Alcohol use: Yes     Comment: wine 1-2 glasses on weekends       Review Of Systems  Ears/Nose/Throat: negative  Respiratory: No shortness of breath, dyspnea on exertion, cough, or hemoptysis  Cardiovascular: negative  Gastrointestinal: negative  Genitourinary: See HPI   Constitutional, HEENT, cardiovascular, pulmonary, GI, , musculoskeletal, neuro, skin, endocrine and psych systems are negative, except as otherwise noted.    OBJECTIVE:  LMP 2023   Presents with SL  on phone         ASSESSMENT:  Sammie Ramirez is an 47 year old   woman who presents for   gynecology consult for Pelvic us showing prominent endometrial lining,   Recently started treatment for  pelvic pain with Oral Contraceptive and   Now having some nausea.  Having a migraine for about 10 days with   Nausea.  This headache is different, she had reported daily headaches   When seeing me 10/5/2023, but now reports this only lasted two days,   Felt the Oral Contraceptive helped her headache and now the headache has occurred   For 10 days. Now also having some abnormal bleeding.   Previously had catamenial headaches only.      PLAN:  Dx:  1)  catamenial headache: discontinue martin and start micronor, to stop estrogen   2)  Prominent endometrium:    Discussed endometrial sampling, discussed endometrial biopsy and hysteroscopy and dilation and curettage.    Desires the hysteroscopy and dilation and curettage.  Orders placed       Dr. Krystin Sen, DO    Obstetrics and Gynecology  [facility] [place] and [place]

## 2023-11-06 NOTE — PROGRESS NOTES
Sammie Ramirez is a 47 year old female who is being evaluated via a billable telephone visit.      What phone number would you like to be contacted at? [phone] (the  automatically connects when you dial)  How would you like to obtain your AVS? Jessica      Sammie Ramirez is a 47 year old female who presents for virtual visit today for the following health issue(s):  Patient presents with:  Results: Go over pap results  Medication Follow-up: Started OCP a month ago--not feeling well for a week and a half-having nausea and pain--started bleeding yesterday      Lynsey Starks CMA

## 2023-11-07 ENCOUNTER — TELEPHONE (OUTPATIENT)
Dept: OBGYN | Facility: CLINIC | Age: 48
End: 2023-11-07
Payer: MEDICARE

## 2023-11-07 NOTE — TELEPHONE ENCOUNTER
Patient Name: Sammie aRmirez   MRN: [id number]   Case#: [id number]   Surgeon(s) and Role:      * Krystin Sen,  - Primary   Date requested: 11/6/2023   Location: [facility] OR   Procedure(s):   HYSTEROSCOPY, WITH DILATION AND CURETTAGE OF UTERUS, hysteorscopy dilation and curettage, possible polypectomy with myosure

## 2023-11-08 ENCOUNTER — OFFICE VISIT (OUTPATIENT)
Dept: FAMILY MEDICINE | Facility: CLINIC | Age: 48
End: 2023-11-08
Payer: MEDICARE

## 2023-11-08 VITALS
OXYGEN SATURATION: 100 % | HEART RATE: 70 BPM | BODY MASS INDEX: 23.08 KG/M2 | WEIGHT: 135.2 LBS | TEMPERATURE: 98 F | DIASTOLIC BLOOD PRESSURE: 85 MMHG | HEIGHT: 64 IN | SYSTOLIC BLOOD PRESSURE: 149 MMHG | RESPIRATION RATE: 17 BRPM

## 2023-11-08 DIAGNOSIS — L03.032 PARONYCHIA OF TOE, LEFT: Primary | ICD-10-CM

## 2023-11-08 DIAGNOSIS — B37.31 YEAST INFECTION OF THE VAGINA: ICD-10-CM

## 2023-11-08 PROCEDURE — 99213 OFFICE O/P EST LOW 20 MIN: CPT

## 2023-11-08 RX ORDER — DOXYCYCLINE 100 MG/1
100 CAPSULE ORAL 2 TIMES DAILY
Qty: 20 CAPSULE | Refills: 0 | Status: SHIPPED | OUTPATIENT
Start: 2023-11-08 | End: 2023-11-18

## 2023-11-08 RX ORDER — FLUCONAZOLE 150 MG/1
150 TABLET ORAL
Qty: 3 TABLET | Refills: 0 | Status: SHIPPED | OUTPATIENT
Start: 2023-11-08 | End: 2023-11-15

## 2023-11-08 ASSESSMENT — PAIN SCALES - GENERAL: PAINLEVEL: NO PAIN (0)

## 2023-11-08 NOTE — PATIENT INSTRUCTIONS
Doxycycline for 10 days     Diflucan every 3 days during Doxycycline prescription    Triamcinolone as needed for inflammation to toe    Continue warm water soaks, ideally at least twice a day

## 2023-11-08 NOTE — COMMUNITY RESOURCES LIST (ENGLISH)
11/08/2023   [facility]  N/A  For questions about this resource list or additional care needs, please contact your primary care clinic or care manager.  Phone: [phone]   Email: N/A   Address: [address] [place] [address]   Hours: N/A        Hotlines and Helplines       Hotline - Housing crisis  1  [facility] (Main Office) - Emergency Services Distance: 17.24 miles      Phone/Virtual   [address] [place] [address]  Language: English  Hours: Mon - Sun Open 24 Hours   Phone: (499) 240-1034 Email: info@[facility].org Website: http://[facility].org     2  [facility] Distance: 23.97 miles      Phone/Virtual   [address] [place] [address]  Language: English  Hours: Mon - Sun Open 24 Hours   Phone: (494) 226-4847 Email: communications@[facility].org Website: https://[facility].org/oursaviourshousing/          Housing       Coordinated Entry access point  3  [facility] Distance: 22.33 miles      In-Person, Phone/Virtual   [address] [place] [address]  Language: English, Maltese  Hours: Mon - Fri 8:30 AM - 4:30 PM  Fees: Free   Phone: (897) 665-8920 Email: info@[facility].org Website: https://www.[facility].org/locations/downto-clinic/     4  [facility] ([facility] - Housing Services Distance: 23.91 miles      In-Person   [address] [place] [address]  Language: English  Hours: Mon - Fri 9:00 AM - 5:00 PM  Fees: Free   Phone: (326) 660-7250 Email: housing@[facility].org Website: http://www.[facility].org/housing     Drop-in center or day shelter  5  [facility] Distance: 23.31 miles      In-Person   [address] [place] [address]  Language: English  Hours: Mon - Fri 9:00 AM - 4:00 PM  Fees: Free   Phone: (388) 825-7343 Email: frontchrisk@[employer].org Website: http://listeninghouse.org     6  [facility] Distance: 24.34 miles      In-Person    [address] [place] [address]  Language: English  Hours: Mon - Fri 12:00 PM - 3:00 PM  Fees: Free   Phone: (466) 705-5855 Email: CompassoftchrisMaestro@[employer] Website: http://[facility]/     Housing search assistance  7  [place] [employer] - Rental Homes for Future Homebuyers Program Distance: 16.06 miles      Phone/Virtual   [address] [place] [address]  Language: English  Hours: Mon - Fri 8:00 AM - 4:30 PM  Fees: Free   Phone: (197) 249-8258 Email: hra@[facility] Website: https://www.[facility]/hra/[place]-housing-and-jhtxaxffvjcsl-gdisrxlnz-epo     8  [facility] Aging and Disability Services Distance: 18.12 miles      In-Person   [address] [place] [address]  Language: English  Hours: Mon - Fri 8:00 AM - 4:00 PM  Fees: Free, Insurance, Sliding Fee   Phone: (767) 481-3968 Email: juan josé@[facility]. Website: https://www.[facility]./HealthFamily/Disabilities     Shelter for families  9  [facility] Shelter Distance: 14.33 miles      In-Person   [address] [place] [address]  Language: English  Hours: Mon - Sun Open 24 Hours  Fees: Free, Sliding Fee   Phone: (238) 201-9507 Ext.1 Email: info@[facility].org Website: http://www.[facility].org     Shelter for individuals  10  [employer] [facility] ([facility], [facility] Distance: 7.93 miles      In-Person   [address] [place] [address]  Language: English, English  Hours: Mon - Fri 8:00 AM - 4:30 PM  Fees: Free   Phone: (731) 250-4046 Email: info@[facility].org Website: http://www.[facility].org     11  [facility] - [facility] [place] [facility] - [facility] [place] [facility] Distance: 22.35 miles      In-Person   [address] [place] [address]  Language: English  Hours: Mon - Sun 5:00 PM - 10:00 AM  Fees: Free, Self Pay   Phone:  (340) 259-2383 Email: info@[employer].org Website: https://www.[employer].org/locations/[facility]/          Important Numbers & Websites       Emergency Services   911  [facility] Services   311  Poison Control   (732) 349-1082  Suicide Prevention Lifeline   (348) 707-2045 (TALK)  Child Abuse Hotline   (232) 355-4884 (4-A-Child)  Sexual Assault Hotline   (767) 571-9441 (HOPE)  National Runaway Safeline   (520) 840-3225 (RUNAWAY)  All-Options Talkline   (429) 527-9341  Substance Abuse Referral   (553) 910-9916 (HELP)

## 2023-11-08 NOTE — COMMUNITY RESOURCES LIST (ENGLISH)
11/08/2023   [facility] [employer] [facility]  N/A  For questions about this resource list or additional care needs, please contact your primary care clinic or care manager.  Phone: [phone]   Email: N/A   Address: [address] [place] [address]   Hours: N/A        Hotlines and Helplines       Hotline - Housing crisis  1  [facility] (Main Office) - Emergency Services Distance: 17.24 miles      Phone/Virtual   [address] [place] [address]  Language: English  Hours: Mon - Sun Open 24 Hours   Phone: (592) 286-6584 Email: info@[facility].org Website: http://[facility].org     2  [facility] Distance: 23.97 miles      Phone/Virtual   [address] [place] [address]  Language: English  Hours: Mon - Sun Open 24 Hours   Phone: (269) 986-8691 Email: communications@[facility].org Website: https://[facility]mn.org/oursaviourshousing/          Housing       Coordinated Entry access point  3  [facility] Distance: 22.33 miles      In-Person, Phone/Virtual   [address] [place] [address]  Language: English, Divehi  Hours: Mon - Fri 8:30 AM - 4:30 PM  Fees: Free   Phone: (257) 953-7710 Email: info@[facility].org Website: https://www.[facility].org/locations/downto-clinic/     4  [facility] - Housing Services Distance: 23.91 miles      In-Person   [address] [place] [address]  Language: English  Hours: Mon - Fri 9:00 AM - 5:00 PM  Fees: Free   Phone: (524) 987-5696 Email: housing@[facility].org Website: http://www.[facility].org/housing     Drop-in center or day shelter  5  [facility] Distance: 23.31 miles      In-Person   [address] [place] [address]  Language: English  Hours: Mon - Fri 9:00 AM - 4:00 PM  Fees: Free   Phone: (415) 893-3755 Email: frontchrisk@[employer].org Website: http://listeninghouse.org     6  [facility] Distance: 24.34 miles      In-Person    [address] [place] [address]  Language: English  Hours: Mon - Fri 12:00 PM - 3:00 PM  Fees: Free   Phone: (929) 716-9314 Email: PsyQicchrisAF83@[employer] Website: http://[employer]/     Housing search assistance  7  [place] [employer] - Rental Homes for Future Homebuyers Program Distance: 16.06 miles      Phone/Virtual   [address] [place] [address]  Language: English  Hours: Mon - Fri 8:00 AM - 4:30 PM  Fees: Free   Phone: (474) 422-9471 Email: hra@[facility] Website: https://www.[facility]/hra/[place]-housing-and-zrjkatqkutecw-yjvfeinqb-gnz     8  [facility] Aging and Disability Services Distance: 18.12 miles      In-Person   [address] [place] [address]  Language: English  Hours: Mon - Fri 8:00 AM - 4:00 PM  Fees: Free, Insurance, Sliding Fee   Phone: (979) 788-4678 Email: juan joés@[facility]. Website: https://www.[facility]./HealthFamily/Disabilities     Shelter for families  9  [facility] Family Shelter Distance: 14.33 miles      In-Person   [address] [place] [address]  Language: English  Hours: Mon - Sun Open 24 Hours  Fees: Free, Sliding Fee   Phone: (261) 570-9038 Ext.1 Email: info@[facility].org Website: http://www.[facility].org     Shelter for individuals  10  [facility] [employer] [facility] ([facility] Distance: 7.93 miles      In-Person   [address] [place] [address]  Language: English, Slovenian  Hours: Mon - Fri 8:00 AM - 4:30 PM  Fees: Free   Phone: (641) 700-5790 Email: info@[facility].org Website: http://www.[facility].org     11  [facility] [place] - [facility] [place] [facility] - [facility] [place] [facility] Distance: 22.35 miles      In-Person   [address] [place] [address]  Language: English  Hours: Mon - Sun 5:00 PM - 10:00 AM  Fees: Free, Self Pay   Phone:  (546) 269-2214 Email: info@[employer].org Website: https://www.[employer].org/locations/[facility]/          Important Numbers & Websites       Emergency Services   911  [facility] Services   311  Poison Control   (825) 458-5829  Suicide Prevention Lifeline   (333) 911-2589 (TALK)  Child Abuse Hotline   (535) 127-7252 (4-A-Child)  Sexual Assault Hotline   (143) 234-4499 (HOPE)  National Runaway Safeline   (825) 632-1056 (RUNAWAY)  All-Options Talkline   (871) 548-5093  Substance Abuse Referral   (277) 849-3512 (HELP)

## 2023-11-08 NOTE — PROGRESS NOTES
Assessment & Plan     (L03.032) Paronychia of toe, left  (primary encounter diagnosis)  Comment: will treat w/ Doxycycline. Has tolerated this in the past for paronychia and with good results. Continue warm water soaks, may use triamcinolone on toe for inflammation. Discussed medication risks and benefits of Doxycycline with patient in detail with patient verbal understanding. Discussed avoiding MV and dairy products while taking. Patient going to [place] next week and stressed importance of sunscreen application given increased sensitivity to sun w/ doxy. Patient fully understands and is agreeable with plan of care, at this point patient will follow up as needed unless acute concerns arise in the meantime.  Plan: doxycycline hyclate (VIBRAMYCIN) 100 MG capsule          (B37.31) Yeast infection of the vagina  Comment: gets yeast infections w/ any antibiotic in past. Will provide Diflucan for patient to take during Doxycycline regimen. Discussed medication risks and benefits of Diflucan with patient in detail with patient verbal understanding. Patient fully understands and is agreeable with plan of care, at this point patient will follow up as needed unless acute concerns arise in the meantime.   Plan: fluconazole (DIFLUCAN) 150 MG tablet    20 minutes spent by me on the date of the encounter doing chart review, history and exam, documentation and further activities per the note         JORGE Juárez CNP  [facility]    Jim Cochran is a 47 year old, presenting for the following health issues:  Toe Pain      11/8/2023    12:49 PM   Additional Questions   Roomed by Gali Meléndez   Accompanied by        History of Present Illness       Reason for visit:  Toe pain  Symptom onset:  More than a month    She eats 0-1 servings of fruits and vegetables daily.She consumes 0 sweetened beverage(s) daily.She exercises with enough effort to increase her heart rate 60 or more  minutes per day.  She exercises with enough effort to increase her heart rate 5 days per week.   She is taking medications regularly.     Concern - L foot Toe pain  Onset: Over 3 Months.  Description: Swollen and red.   Drainage from toe nail and odor.   Intensity: 5/10  Progression of Symptoms:  Worsening.  Accompanying Signs & Symptoms: PT reports having a fever last week.  Precipitating factors:        Worsened by: Activity  Alleviating factors:        Improved by: None.   Therapies tried and outcome: None    -Has been walking a lot lately and feels toenail rubbed up against shoe causing irritation  -Very painful on Monday  -Pushed out pus from nail bed yesterday    Review of Systems   Constitutional, GI, ,  skin, endocrine and psych systems are negative, except as otherwise noted.      Objective    LMP 05/12/2023   There is no height or weight on file to calculate BMI.  Physical Exam  Vitals and nursing note reviewed.   Constitutional:       General: She is not in acute distress.     Appearance: Normal appearance. She is not ill-appearing.   Cardiovascular:      Rate and Rhythm: Normal rate.   Pulmonary:      Effort: Pulmonary effort is normal.   Skin:     General: Skin is warm and dry.      Findings: Erythema present.      Comments: Left great toe erythematous, swollen, purulent drainage from nail bed noted, very sensitive and tender to touch.   Neurological:      Mental Status: She is alert.   Psychiatric:         Mood and Affect: Mood normal.         Behavior: Behavior normal.         Thought Content: Thought content normal.         Judgment: Judgment normal.

## 2023-11-21 ENCOUNTER — TELEPHONE (OUTPATIENT)
Dept: OBGYN | Facility: CLINIC | Age: 48
End: 2023-11-21
Payer: MEDICARE

## 2023-11-21 DIAGNOSIS — R10.2 PELVIC PAIN IN FEMALE: ICD-10-CM

## 2023-11-21 DIAGNOSIS — Z30.011 ENCOUNTER FOR INITIAL PRESCRIPTION OF CONTRACEPTIVE PILLS: ICD-10-CM

## 2023-11-21 DIAGNOSIS — N92.0 MENORRHAGIA WITH REGULAR CYCLE: ICD-10-CM

## 2023-11-21 DIAGNOSIS — N80.9 ENDOMETRIOSIS: ICD-10-CM

## 2023-11-21 DIAGNOSIS — N95.1 PERIMENOPAUSE: ICD-10-CM

## 2023-11-21 NOTE — TELEPHONE ENCOUNTER
Pt calling via .    States that since she started this new ocp, she has been having issues with hot flashes, night sweats, mood swings, and insomnia.    Pt states that she liked the previous ocp better.    Pt is aware that the md is out of the office until next Monday.    She will continue taking the current ocp until she hears back.      Sakshi LANTIGUA RN

## 2023-11-22 RX ORDER — LEVONORGESTREL AND ETHINYL ESTRADIOL 90; 20 UG/1; UG/1
1 TABLET ORAL DAILY
Qty: 90 TABLET | Refills: 3 | Status: SHIPPED | OUTPATIENT
Start: 2023-11-22 | End: 2023-11-28

## 2023-11-22 RX ORDER — FLUCONAZOLE 150 MG/1
150 TABLET ORAL DAILY
Qty: 1 TABLET | Refills: 1 | Status: SHIPPED | OUTPATIENT
Start: 2023-11-22 | End: 2024-01-05

## 2023-11-22 NOTE — TELEPHONE ENCOUNTER
"Patient finished doxycycline and now has \"itchiness\" in the vaginal area. Requesting a refill of the \"yeast pill\".    Flagyl pended for provider review.    Bertha Draper RN   "

## 2023-11-22 NOTE — TELEPHONE ENCOUNTER
Type of surgery: hysteroscopy d&c, possible polypectomy with myosure  Location of surgery: [facility]  Date and time of surgery: 1/10/24 @ 12:00 pm  Surgeon: Dr. Sen  Pre-Op Appt Date: Patient advised to schedule.  Post-Op Appt Date: 1/22/24   Packet sent out: Yes  Pre-cert/Authorization completed:  No  Date: 11/22/23

## 2023-11-22 NOTE — TELEPHONE ENCOUNTER
Please advise new rx called in for the other Oral Contraceptive she wanted    Dr. Krystin Sen, DO    Obstetrics and Gynecology  [facility] and [place]

## 2023-11-28 RX ORDER — LEVONORGESTREL AND ETHINYL ESTRADIOL 90; 20 UG/1; UG/1
1 TABLET ORAL DAILY
Qty: 90 TABLET | Refills: 3 | Status: SHIPPED | OUTPATIENT
Start: 2023-11-28 | End: 2024-01-05 | Stop reason: ALTCHOICE

## 2023-11-28 NOTE — TELEPHONE ENCOUNTER
Spoke to pt via .    Sent Jyoti to [facility] (it was sent to [facility] pharmacy)    Will call them in about 30 min to ensure she can pick this up. Has had a lot of back and forth with [facility].      Marysol ORDAZ RN BSN

## 2023-11-28 NOTE — TELEPHONE ENCOUNTER
Called pharmacy, verified prescription status.  Reports it will be available for  today any time after 12pm.    Roxy LUCIANO RN

## 2023-11-28 NOTE — TELEPHONE ENCOUNTER
Left message with the assistance of an  that medication should be ready for  any time after 12 pm today, and to return call or send message with any further questions or concerns.    Roxy LUCIANO RN

## 2023-11-30 ENCOUNTER — VIRTUAL VISIT (OUTPATIENT)
Dept: OBGYN | Facility: CLINIC | Age: 48
End: 2023-11-30
Payer: MEDICARE

## 2023-11-30 DIAGNOSIS — N95.1 PERIMENOPAUSE: ICD-10-CM

## 2023-11-30 DIAGNOSIS — N80.9 ENDOMETRIOSIS: ICD-10-CM

## 2023-11-30 DIAGNOSIS — R10.2 PELVIC PAIN IN FEMALE: Primary | ICD-10-CM

## 2023-11-30 PROCEDURE — 99441 PR PHYSICIAN TELEPHONE EVALUATION 5-10 MIN: CPT | Mod: 95 | Performed by: FAMILY MEDICINE

## 2023-11-30 RX ORDER — DESOGESTREL AND ETHINYL ESTRADIOL 0.15-0.03
1 KIT ORAL DAILY
Qty: 90 TABLET | Refills: 3 | Status: SHIPPED | OUTPATIENT
Start: 2023-11-30 | End: 2024-01-05

## 2023-11-30 NOTE — PROGRESS NOTES
Sammie Ramirez is a 47 year old female who is being evaluated via a billable telephone visit.      What phone number would you like to be contacted at? [phone]  How would you like to obtain your AVS? Jessica      Sammie Ramirez is a 47 year old female who presents for virtual visit today for the following health issue(s):  Patient presents with:  Medication Follow-up: Pt needs clarification regarding her birth control pills    Lnysey Starks CMA

## 2023-11-30 NOTE — PROGRESS NOTES
Start: time: 1431  End time: 1439  Time in minutes: 8 minutes   Patient presents in her home   Dr. Sen presents in her [place] office, [facility]    SUBJECTIVE:  Sammie Ramirez is an 47 year old   woman who presents for   gynecology consult for birth control counseling follow-up, martin caused headaches, micronor   Didn't help.      Patient's last menstrual period was 2023.       Past Medical History:   Diagnosis Date    ASCUS (atypical squamous cells of undetermined significance) on Pap smear 10/13/2016    Neg HPV    History of colposcopy 2018    LSIL (low grade squamous intraepithelial lesion) on Pap smear 2014          Family History   Problem Relation Age of Onset    Breast Cancer Mother     Family History Negative Mother     Family History Negative Father     Prostate Cancer Father     Liver Cancer Father     Breast Cancer Maternal Grandmother     Myocardial Infarction Paternal Grandmother     Myocardial Infarction Paternal Grandfather        Past Surgical History:   Procedure Laterality Date    DAVINCI PELVIC PROCEDURE Right 2022    Procedure: Xi Robotic  endometriosis resection/fulguration, cervical biopsy, and endometrial biopsy;  Surgeon: Krystin Sen DO;  Location: [facility] OR    LEEP TX, CERVICAL  x 2 per chart    historical    ORTHOPEDIC SURGERY         Current Outpatient Medications   Medication    norethindrone (MICRONOR) 0.35 MG tablet    fluconazole (DIFLUCAN) 150 MG tablet    hydrocortisone, Perianal, (ANUSOL-HC) 2.5 % cream    hydrOXYzine (VISTARIL) 50 MG capsule    levocetirizine (XYZAL) 5 MG tablet    levonorgestrel-ethinyl estrad (MARTIN) 90-20 MCG tablet    meloxicam (MOBIC) 15 MG tablet    mometasone (ELOCON) 0.1 % external ointment    mometasone (NASONEX) 50 MCG/ACT nasal spray    montelukast (SINGULAIR) 10 MG tablet    mupirocin (BACTROBAN) 2 % external ointment    naloxone (NARCAN) 4 MG/0.1ML nasal spray    omeprazole (PRILOSEC) 40 MG   capsule    ondansetron (ZOFRAN) 8 MG tablet    polyethylene glycol (MIRALAX) 17 GM/Dose powder    rizatriptan (MAXALT) 10 MG tablet    SUMAtriptan (IMITREX) 100 MG tablet    triamcinolone (KENALOG) 0.1 % external cream     No current facility-administered medications for this visit.     Allergies   Allergen Reactions    Amoxicillin      Rash, hives    Co-Trimoxazole [Sulfamethoxazole-Trimethoprim] Other (See Comments)     Yeast infection when taking this medication    Codeine Sulfate Nausea and Vomiting    Hydrocodone Nausea and Vomiting    Hydromorphone Itching     Light reaction, not severe    Lactose Diarrhea     dairy    Scopolamine Headache     Severe headache    Seasonal Allergies     Tramadol Headache and Nausea     Unsure if reaction was due to medication or COVID vaccine       Social History     Tobacco Use    Smoking status: Never    Smokeless tobacco: Never   Substance Use Topics    Alcohol use: Yes     Comment: wine 1-2 glasses on weekends       Review Of Systems  Ears/Nose/Throat: negative  Respiratory: No shortness of breath, dyspnea on exertion, cough, or hemoptysis  Cardiovascular: negative  Gastrointestinal: negative  Genitourinary: See HPI   Constitutional, HEENT, cardiovascular, pulmonary, GI, , musculoskeletal, neuro, skin, endocrine and psych systems are negative, except as otherwise noted.    OBJECTIVE:  LMP 2023   Telephone visit       ASSESSMENT:  Sammie Ramirez is an 47 year old   woman who presents for   gynecology consult for birth control counseling follow-up, martin caused headaches, micronor   Didn't help.      PLAN:  Dx:  1)  Endometriosis, pelvic pain:  Will prescribe Apri, with different dosing of estrogen   New rx given.       Labs were reviewed in [facility]   Imaging was reviewed in Epic   Tests and documents were reviewed.   Discussion of management or test interpretation       Dr. Krystin Sen, DO    Obstetrics and Gynecology  [facility] and  [place]

## 2023-11-30 NOTE — TELEPHONE ENCOUNTER
"Patient returning call today.    Reports that she is still unable to  the Martin, as it is labeled \"too soon\" by insurance.    Patient reports she believes this is not the birth control that she is supposed to be taking, as this one originally gave her headaches.  Patient is wanting to go back to the medication she was on before the martin, and would like to have an appointment to discuss with Dr. Sen urgently, as she will be out of [facility] in 4 days, and is unable to  the martin.    Scheduled for telephone appointment at 1415.  Patient is aware that it is an overbook appointment and may be either early or late.    Roxy LUCIANO RN    "

## 2023-12-05 NOTE — TELEPHONE ENCOUNTER
Hi Dr Sen,    Pt is due for a colp (see pap hx below.) Pt is having a hysteroscopy with you in January. Are you able to do the colpo at that time as well? If so, can you please notify your care team so that they can reserve the time/equipment needed? I can call pt when I hear back from you.    Pap history:  Hx of LEEP x 2  2/14/12: LSIL  10/3/12: NIL.  Plan pap in 1 yr.  10/2013 ASCUS neg HPV.  Plan pap in 1 yr. Per ASCCP algorithms, needs two negative cotests at 12 & 24 months after LEEP before going to cotest in 3 yrs.  10/2014: NIL pap, neg HPV. Plan cotest pap & HPV in 1 year. Tracking started.  2/9/2016 Ascus Neg HPV, plan: cotest one year per MD. Tracking.   05/01/17: NIL pap, Neg HR HPV result. Plan cotest in 1 year per provider.  5/15/18 AGC - Endocervical cells, Neg HPV. Plan colp and endometrial sampling per guidelines.   06/14/18: [place] bx Suggestive of LSIL ROBERT-1 and Cervicitis. ECC Neg for dyplasia. EMB neg for dysplasia. Plan cotest in 1 year.   07/19/19 Patient is lost to pap tracking follow-up.   2/10/20 NIL pap, Neg HPV. Plan cotest in 1 year.    03/1/21 NIL, +HR HPV, not 16/18. Plan [place] bef 6/1/21 5/24/21 [place] Bx & ECC: benign. Plan 1 year cotest  8/15/22 LSIL Pap, neg HPV. Plan: colp by 11/15/22  9/28/22 COLP and ECC- Negative for dysplasia. Plan cotest in 1 year due by 9/28/23  10/5/23 ASCUS, Neg HPV. Plan [place] bef 1/5/24  10/13/23 Phone rang and rang with no answer. MyChart sent.   10/16/23 Left msg with use of  to call back.   10/20/23 Call to pt with . LM for pt to call back and/or check mychart . Pt viewed mychart    Thanks!  Quinton Perla, BREANNN, RN  Pap Tracking Nurse

## 2023-12-06 ENCOUNTER — PREP FOR PROCEDURE (OUTPATIENT)
Dept: OBGYN | Facility: CLINIC | Age: 48
End: 2023-12-06
Payer: MEDICARE

## 2023-12-07 NOTE — TELEPHONE ENCOUNTER
Phone call to pt to let her know colpo would be done at the same time . LM for pt to call me back at [phone].

## 2023-12-11 ENCOUNTER — TELEPHONE (OUTPATIENT)
Dept: OBGYN | Facility: CLINIC | Age: 48
End: 2023-12-11
Payer: MEDICARE

## 2023-12-11 DIAGNOSIS — N80.9 ENDOMETRIOSIS: Primary | ICD-10-CM

## 2023-12-11 DIAGNOSIS — R10.2 PELVIC PAIN IN FEMALE: ICD-10-CM

## 2023-12-11 NOTE — TELEPHONE ENCOUNTER
Attempted to return call to pharmacy, on hold for 10 minutes.  Will try to call back later.    Roxy LUCIANO RN

## 2023-12-11 NOTE — TELEPHONE ENCOUNTER
Attempted to return call to pharmacy, on hold for 5 minutes.  Will try again later.    Roxy LUCIANO RN

## 2023-12-11 NOTE — TELEPHONE ENCOUNTER
Left message for patient to return my call to [phone]  Quinton Perla, BREANNN, RN   Pap Tracking Nurse

## 2023-12-11 NOTE — TELEPHONE ENCOUNTER
"Rx: Jyoti 90-20MCG TABS; Take 1 tablet by mouth daily    Faxed message from pharmacy: faxed to clinic on 12/8/23 6:04pm  \"Patient was under the impression she was to d/c this and micronor and start 2 new meds - hormone and pain med.  Could you please clarify which medication(s)  the patient is to be taking?  She saiid Jyoti is causing headaches and wants to d/c it. \"    Thanks   Jennifer Littlejohn CMA      "

## 2023-12-14 NOTE — TELEPHONE ENCOUNTER
Call to pt to update her on plan. Left message for patient to return my call to [phone]  BREANN SzymanskiN, RN   Pap Tracking Nurse

## 2023-12-14 NOTE — TELEPHONE ENCOUNTER
Spoke with pt on phone and she is aware of results and plan.    Quinton Perla, BSN, RN  Pap Tracking Nurse

## 2023-12-15 NOTE — TELEPHONE ENCOUNTER
Dr Sen-    This pt is very confused and frustrated with her medications. She states that she was told at her visit that she would be getting two medications- one OCP and then another for pain?    She is also thinking that it was two OCPs    I advised that note mentions trying Apri (as martin caused HAs and micronor did not work and caused mood swings).     Is there anything besides the Apri that this pt is to be taking right now? I do see meloxicam on there so maybe that is what she is thinking of for the pain?    Advised I would call her Monday, she is fine with this    Marysol ORDAZ RN BSN

## 2023-12-18 RX ORDER — MELOXICAM 15 MG/1
15 TABLET ORAL DAILY
Qty: 30 TABLET | Refills: 11 | Status: SHIPPED | OUTPATIENT
Start: 2023-12-18 | End: 2024-03-04

## 2023-12-18 NOTE — TELEPHONE ENCOUNTER
Please let her know yes to Apri was called in   Will also call in mobic, sorry, not sure why I didn't do that. She does have mobic already prescribed with 3 refills, so that may be why I didn't call in more!     Dr. Krystin Sen, DO    Obstetrics and Gynecology  [facility]

## 2023-12-21 DIAGNOSIS — L03.039 CELLULITIS OF TOE, UNSPECIFIED LATERALITY: ICD-10-CM

## 2023-12-21 RX ORDER — MUPIROCIN 20 MG/G
OINTMENT TOPICAL 3 TIMES DAILY
Qty: 30 G | Refills: 0 | Status: SHIPPED | OUTPATIENT
Start: 2023-12-21

## 2023-12-27 ENCOUNTER — TELEPHONE (OUTPATIENT)
Dept: OBGYN | Facility: CLINIC | Age: 48
End: 2023-12-27
Payer: MEDICARE

## 2023-12-27 DIAGNOSIS — R11.2 NON-INTRACTABLE VOMITING WITH NAUSEA: ICD-10-CM

## 2023-12-27 RX ORDER — ONDANSETRON 8 MG/1
TABLET, FILM COATED ORAL
Qty: 20 TABLET | Refills: 1 | Status: SHIPPED | OUTPATIENT
Start: 2023-12-27 | End: 2024-03-04

## 2023-12-27 NOTE — TELEPHONE ENCOUNTER
Pt started taking her Apri. She is on day 5 and has extreme nausea.    She does use ondansetron for nausea and wants refill so she can use this while trying to continue this Apri.    Sent refill of zofran. Pt will try to continue until we hear from Dr Sen.     She wants a visit prior to surgery, she has mixed feelings about it.    Scheduled on call day pt is aware that it is call day.  I did advise that she can stop if sx are not tolerable and to stop if vision disturbance and migraine were to appear for any reason.    Marysol ORDAZ RN BSN

## 2024-01-02 NOTE — TELEPHONE ENCOUNTER
Pt calling back via .     She stopped the ocp yesterday.     She is not taking any meds.    Pt is wondering what the next step is.    She does have an appt on 1/5/24.    Sakshi LANTIGUA RN

## 2024-01-05 ENCOUNTER — OFFICE VISIT (OUTPATIENT)
Dept: OBGYN | Facility: CLINIC | Age: 49
End: 2024-01-05
Payer: MEDICARE

## 2024-01-05 VITALS — WEIGHT: 135.2 LBS | SYSTOLIC BLOOD PRESSURE: 132 MMHG | DIASTOLIC BLOOD PRESSURE: 70 MMHG | BODY MASS INDEX: 23.21 KG/M2

## 2024-01-05 DIAGNOSIS — R10.2 PELVIC PAIN IN FEMALE: ICD-10-CM

## 2024-01-05 DIAGNOSIS — N80.9 ENDOMETRIOSIS: Primary | ICD-10-CM

## 2024-01-05 DIAGNOSIS — N95.1 PERIMENOPAUSE: ICD-10-CM

## 2024-01-05 PROCEDURE — T1013 SIGN LANG/ORAL INTERPRETER: HCPCS | Mod: U3 | Performed by: FAMILY MEDICINE

## 2024-01-05 PROCEDURE — 99213 OFFICE O/P EST LOW 20 MIN: CPT | Performed by: FAMILY MEDICINE

## 2024-01-05 RX ORDER — NORETHINDRONE ACETATE AND ETHINYL ESTRADIOL .02; 1 MG/1; MG/1
1 TABLET ORAL DAILY
Qty: 90 TABLET | Refills: 3 | Status: SHIPPED | OUTPATIENT
Start: 2024-01-05 | End: 2024-03-04

## 2024-01-05 NOTE — PROGRESS NOTES
SUBJECTIVE:  Sammie Ramirez is an 48 year old   woman who presents for   Gyn follow-up exam. She was previously seen for pelvic pain treated with Oral Contraceptive,   She began getting headaches with martin that caused a headaches.  And stopped then started micronor which caused hot flushes,   Then started on Apri 2023, and this caused a worse headache.  She noticed mood changes with her combined oral contraceptive.   It seems the estrogen containing Oral Contraceptive is triggering her headaches.     Prior to this she used nothing for pregnancy, periodic abstinence.      Past Medical History:   Diagnosis Date    ASCUS (atypical squamous cells of undetermined significance) on Pap smear 10/13/2016    Neg HPV    History of colposcopy 2018    LSIL (low grade squamous intraepithelial lesion) on Pap smear 2014          Family History   Problem Relation Age of Onset    Breast Cancer Mother     Family History Negative Mother     Family History Negative Father     Prostate Cancer Father     Liver Cancer Father     Breast Cancer Maternal Grandmother     Myocardial Infarction Paternal Grandmother     Myocardial Infarction Paternal Grandfather        Past Surgical History:   Procedure Laterality Date    DAVINCI PELVIC PROCEDURE Right 2022    Procedure: Xi Robotic  endometriosis resection/fulguration, cervical biopsy, and endometrial biopsy;  Surgeon: Krystin Sen DO;  Location: [facility] OR    LEEP TX, CERVICAL  x 2 per chart    historical    ORTHOPEDIC SURGERY         Current Outpatient Medications   Medication    hydrocortisone, Perianal, (ANUSOL-HC) 2.5 % cream    hydrOXYzine (VISTARIL) 50 MG capsule    levocetirizine (XYZAL) 5 MG tablet    meloxicam (MOBIC) 15 MG tablet    mometasone (ELOCON) 0.1 % external ointment    montelukast (SINGULAIR) 10 MG tablet    mupirocin (BACTROBAN) 2 % external ointment    omeprazole (PRILOSEC) 40 MG DR capsule    ondansetron (ZOFRAN) 8 MG  tablet    polyethylene glycol (MIRALAX) 17 GM/Dose powder    rizatriptan (MAXALT) 10 MG tablet    SUMAtriptan (IMITREX) 100 MG tablet    mometasone (NASONEX) 50 MCG/ACT nasal spray    naloxone (NARCAN) 4 MG/0.1ML nasal spray    triamcinolone (KENALOG) 0.1 % external cream     No current facility-administered medications for this visit.     Allergies   Allergen Reactions    Amoxicillin      Rash, hives    Co-Trimoxazole [Sulfamethoxazole-Trimethoprim] Other (See Comments)     Yeast infection when taking this medication    Codeine Sulfate Nausea and Vomiting    Hydrocodone Nausea and Vomiting    Hydromorphone Itching     Light reaction, not severe    Lactose Diarrhea     dairy    Scopolamine Headache     Severe headache    Seasonal Allergies     Tramadol Headache and Nausea     Unsure if reaction was due to medication or COVID vaccine       Social History     Tobacco Use    Smoking status: Never    Smokeless tobacco: Never   Substance Use Topics    Alcohol use: Yes     Comment: wine 1-2 glasses on weekends       Review Of Systems  Ears/Nose/Throat: negative  Respiratory: No shortness of breath, dyspnea on exertion, cough, or hemoptysis  Cardiovascular: negative  Gastrointestinal: negative  Genitourinary: See HPI   Constitutional, HEENT, cardiovascular, pulmonary, GI, , musculoskeletal, neuro, skin, endocrine and psych systems are negative, except as otherwise noted.      OBJECTIVE:  /70   Wt 61.3 kg (135 lb 3.2 oz)   LMP 05/12/2023   BMI 23.21 kg/m    General appearance: healthy, alert, and no distress  Skin: Skin color, texture, turgor normal. No rashes or lesions.  Ears: negative  Nose/Sinuses: Nares normal. Septum midline. Mucosa normal. No drainage or sinus tenderness.  Oropharynx: Lips, mucosa, and tongue normal. Teeth and gums normal.  Neck: Neck supple. No adenopathy. Thyroid symmetric, normal size,, Carotids without bruits.  Lungs: negative, Percussion normal. Good diaphragmatic excursion. Lungs  clear  Heart: negative, PMI normal. No lifts, heaves, or thrills. RRR. No murmurs, clicks gallops or rub        ASSESSMENT:  Sammie Ramirez is an 48 year old   woman who presents for   Gyn follow-up exam. She was previously seen for pelvic pain treated with Oral Contraceptive,   She began getting headaches with martin that caused a headaches.  And stopped then started micronor which caused hot flushes,   Then started on Apri 2023, and this caused a worse headache.  She noticed mood changes with her combined oral contraceptive.   It seems the estrogen containing Oral Contraceptive is triggering her headaches.     Prior to this she used nothing for pregnancy, periodic abstinence.        PLAN:  Dx: Pelvic pain with history of endometriosis   1)  Pelvic pain with history of endometriosis: did not tolerate  Martin, micronor, or apri    Discussed Mirena IUD at time of hysteroscopy and curettage and she declines this   And in fact She wishes to cancel the hysteroscopy and curettage for now and will call     Would like to go back to low dose Oral Contraceptive   Return in 3 weeks for recheck.        Labs were reviewed in [facility] Slides   Imaging was reviewed in Epic   Tests and documents were reviewed.   Discussion of management or test interpretation     Dr. Krystin Sen, DO    OB/GYN   [facility]

## 2024-01-05 NOTE — NURSING NOTE
"Chief Complaint   Patient presents with    Consult     Discuss surgery that is scheduled on 01/10/24- patient is not ready to have surgery at this time and would like to discuss medication options for birth control.        Initial /70   Wt 61.3 kg (135 lb 3.2 oz)   LMP 2023   BMI 23.21 kg/m   Estimated body mass index is 23.21 kg/m  as calculated from the following:    Height as of 23: 1.626 m (5' 4\").    Weight as of this encounter: 61.3 kg (135 lb 3.2 oz).  BP completed using cuff size: regular    Questioned patient about current smoking habits.  Pt. has never smoked.          The following HM Due: NONE  Jose Daniel Gee CMA               " H Plasty Text: Given the location of the defect, shape of the defect and the proximity to free margins a H-plasty was deemed most appropriate for repair.  Using a sterile surgical marker, the appropriate advancement arms of the H-plasty were drawn incorporating the defect and placing the expected incisions within the relaxed skin tension lines where possible. The area thus outlined was incised deep to adipose tissue with a #15 scalpel blade. The skin margins were undermined to an appropriate distance in all directions utilizing iris scissors.  The opposing advancement arms were then advanced into place in opposite direction and anchored with interrupted buried subcutaneous sutures.

## 2024-01-08 ENCOUNTER — PREP FOR PROCEDURE (OUTPATIENT)
Dept: OBGYN | Facility: CLINIC | Age: 49
End: 2024-01-08
Payer: MEDICARE

## 2024-01-08 DIAGNOSIS — R93.89 ENDOMETRIAL THICKENING ON ULTRASOUND: Primary | ICD-10-CM

## 2024-01-09 ENCOUNTER — TELEPHONE (OUTPATIENT)
Dept: GASTROENTEROLOGY | Facility: CLINIC | Age: 49
End: 2024-01-09

## 2024-01-09 ENCOUNTER — OFFICE VISIT (OUTPATIENT)
Dept: FAMILY MEDICINE | Facility: CLINIC | Age: 49
End: 2024-01-09
Payer: MEDICARE

## 2024-01-09 ENCOUNTER — HOSPITAL ENCOUNTER (OUTPATIENT)
Facility: CLINIC | Age: 49
End: 2024-01-09
Attending: INTERNAL MEDICINE | Admitting: INTERNAL MEDICINE
Payer: MEDICARE

## 2024-01-09 VITALS
HEIGHT: 64 IN | BODY MASS INDEX: 23.31 KG/M2 | WEIGHT: 136.5 LBS | DIASTOLIC BLOOD PRESSURE: 70 MMHG | OXYGEN SATURATION: 96 % | RESPIRATION RATE: 16 BRPM | HEART RATE: 62 BPM | TEMPERATURE: 98.1 F | SYSTOLIC BLOOD PRESSURE: 136 MMHG

## 2024-01-09 DIAGNOSIS — G43.009 MIGRAINE WITHOUT AURA AND WITHOUT STATUS MIGRAINOSUS, NOT INTRACTABLE: Primary | ICD-10-CM

## 2024-01-09 DIAGNOSIS — Z12.11 SPECIAL SCREENING FOR MALIGNANT NEOPLASMS, COLON: Primary | ICD-10-CM

## 2024-01-09 PROCEDURE — 99213 OFFICE O/P EST LOW 20 MIN: CPT

## 2024-01-09 RX ORDER — METOCLOPRAMIDE 10 MG/1
10 TABLET ORAL 2 TIMES DAILY PRN
Qty: 90 TABLET | Refills: 0 | Status: SHIPPED | OUTPATIENT
Start: 2024-01-09 | End: 2024-03-27 | Stop reason: ALTCHOICE

## 2024-01-09 ASSESSMENT — ENCOUNTER SYMPTOMS: HEADACHES: 1

## 2024-01-09 NOTE — TELEPHONE ENCOUNTER
"Endoscopy Scheduling Screen    Have you had a positive Covid test in the last 14 days?  No    Are you active on MyChart?   Yes    What insurance is in the chart?  Other:  MEDICARE    Ordering/Referring Provider:     EMA ALCANTAR      (If ordering provider performs procedure, schedule with ordering provider unless otherwise instructed. )    BMI: Estimated body mass index is 23.43 kg/m  as calculated from the following:    Height as of an earlier encounter on 1/9/24: 1.626 m (5' 4\").    Weight as of an earlier encounter on 1/9/24: 61.9 kg (136 lb 8 oz).     Sedation Ordered  moderate sedation.   If patient BMI > 50 do not schedule in [facility].    If patient BMI > 45 do not schedule at [facility].    Are you taking methadone or Suboxone?  No    Are you taking any prescription medications for pain 3 or more times per week?   NO - No RN review required.    Do you have a history of malignant hyperthermia or adverse reaction to anesthesia?  No    (Females) Are you currently pregnant?   No     Have you been diagnosed or told you have pulmonary hypertension?   No    Do you have an LVAD?  No    Have you been told you have moderate to severe sleep apnea?  No    Have you been told you have COPD, asthma, or any other lung disease?  No    Do you have any heart conditions?  No     Have you ever had an organ transplant?   No    Have you ever had or are you awaiting a heart or lung transplant?   No    Have you had a stroke or transient ischemic attack (TIA aka \"mini stroke\" in the last 6 months?   No    Have you been diagnosed with or been told you have cirrhosis of the liver?   No    Are you currently on dialysis?   No    Do you need assistance transferring?   No    BMI: Estimated body mass index is 23.43 kg/m  as calculated from the following:    Height as of an earlier encounter on 1/9/24: 1.626 m (5' 4\").    Weight as of an earlier encounter on 1/9/24: 61.9 kg (136 lb 8 oz).     Is patients BMI > 40 and scheduling location " [facility]?  No    Do you take an injectable medication for weight loss or diabetes (excluding insulin)?  No    Do you take the medication Naltrexone?  No    Do you take blood thinners?  No       Prep   Are you currently on dialysis or do you have chronic kidney disease?  No    Do you have a diagnosis of diabetes?  No    Do you have a diagnosis of cystic fibrosis (CF)?  No    On a regular basis do you go 3 -5 days between bowel movements?  No    BMI > 40?  No    Preferred Pharmacy:    [id number] [place], [place] - [address] [facility] [address] [address] [facility] [address]  Phone: [phone] Fax: [phone]      Final Scheduling Details   Colonoscopy prep sent?  Standard MiraLAX    Procedure scheduled  Colonoscopy    Surgeon:  KATHY    Date of procedure:  04/22/2024      Pre-OP / PAC:   No - Not required for this site.    Location  [facility] - Per order.    Sedation   Moderate Sedation - Per order.      Patient Reminders:   You will receive a call from a Nurse to review instructions and health history.  This assessment must be completed prior to your procedure.  Failure to complete the Nurse assessment may result in the procedure being cancelled.      On the day of your procedure, please designate an adult(s) who can drive you home stay with you for the next 24 hours. The medicines used in the exam will make you sleepy. You will not be able to drive.      You cannot take public transportation, ride share services, or non-medical taxi service without a responsible caregiver.  Medical transport services are allowed with the requirement that a responsible caregiver will receive you at your destination.  We require that drivers and caregivers are confirmed prior to your procedure.

## 2024-01-09 NOTE — PROGRESS NOTES
Assessment & Plan     (G43.009) Migraine without aura and without status migrainosus, not intractable  (primary encounter diagnosis)  Comment: Will trial Reglan as needed.  Continue with rizatriptan as well.  Discussed having patient follow-up if noting no improvement, at that point can try different plan/medication. Discussed medication risks and benefits of Reglan with patient in detail with patient verbal understanding. Patient fully understands and is agreeable with plan of care, at this point patient will follow up as needed unless acute concerns arise in the meantime.  Plan: metoclopramide (REGLAN) 10 MG tablet                JORGE Juárez CNP  [facility] GEGE Cochran is a 48 year old, presenting for the following health issues:  Headache        1/9/2024    10:36 AM   Additional Questions   Roomed by Umu AREVALO CMA       History of Present Illness       Reason for visit:  Request for new medication for headaches/migraine    She eats 2-3 servings of fruits and vegetables daily.She consumes 2 sweetened beverage(s) daily.   She is taking medications regularly.     Migraine   Since your last clinic visit, how have your headaches changed?  No change, is perimenopausal and they have been adjusting her birthcontrol and migraine medications.  How often are you getting headaches or migraines? Was sometimes daily, but is on new medication  Are you able to do normal daily activities when you have a migraine? No  Are you taking rescue/relief medications? (Select all that apply) sumatriptan (Imitrex) and Maxalt  How helpful is your rescue/relief medication?  I get some relief would like for her to be on a back up migraine medication.   Are you taking any medications to prevent migraines? (Select all that apply)  No  In the past 4 weeks, how often have you gone to urgent care or the emergency room because of your headaches?  0    Currently seeing OB and has been making some  "medication changes to help with endometriosis and perimenopause.  Patient headaches have been a bit worse, discussed with OB and would like to trial medication Reglan  Currently denies any red flag headache symptoms  No aura noted    Review of Systems   Neurological:  Positive for headaches.      Constitutional, HEENT, cardiovascular, pulmonary, GI, , musculoskeletal, neuro, skin, endocrine and psych systems are negative, except as otherwise noted.      Objective    /70 (BP Location: Right arm, Patient Position: Sitting, Cuff Size: Adult Regular)   Pulse 62   Temp 98.1  F (36.7  C) (Temporal)   Resp 16   Ht 1.626 m (5' 4\")   Wt 61.9 kg (136 lb 8 oz)   LMP 05/12/2023   SpO2 96%   BMI 23.43 kg/m    Body mass index is 23.43 kg/m .  Physical Exam  Vitals and nursing note reviewed.   Constitutional:       General: She is not in acute distress.     Appearance: Normal appearance. She is not ill-appearing.   Cardiovascular:      Rate and Rhythm: Normal rate.   Pulmonary:      Effort: Pulmonary effort is normal.   Neurological:      General: No focal deficit present.      Mental Status: She is alert.   Psychiatric:         Mood and Affect: Mood normal.         Behavior: Behavior normal.         Thought Content: Thought content normal.         Judgment: Judgment normal.                "

## 2024-01-11 ENCOUNTER — TELEPHONE (OUTPATIENT)
Dept: OBGYN | Facility: CLINIC | Age: 49
End: 2024-01-11
Payer: MEDICARE

## 2024-01-11 NOTE — TELEPHONE ENCOUNTER
Type of surgery: hysteroscopy dilation and curettage, possible polypectomy with myosure, colpsocopy  Location of surgery: [facility]  Date and time of surgery: 1/31/24 @ 10:00 am  Surgeon: Dr. Sen  Pre-Op Appt Date: Patient advised to schedule.  Post-Op Appt Date: 2/14/24   Packet sent out: Yes  Pre-cert/Authorization completed:  No  Date: 1/11/24

## 2024-01-16 ENCOUNTER — OFFICE VISIT (OUTPATIENT)
Dept: FAMILY MEDICINE | Facility: CLINIC | Age: 49
End: 2024-01-16
Payer: MEDICARE

## 2024-01-16 VITALS
SYSTOLIC BLOOD PRESSURE: 126 MMHG | WEIGHT: 136.1 LBS | BODY MASS INDEX: 23.23 KG/M2 | TEMPERATURE: 98.7 F | DIASTOLIC BLOOD PRESSURE: 80 MMHG | OXYGEN SATURATION: 99 % | RESPIRATION RATE: 16 BRPM | HEIGHT: 64 IN | HEART RATE: 70 BPM

## 2024-01-16 DIAGNOSIS — R10.2 PELVIC PAIN IN FEMALE: ICD-10-CM

## 2024-01-16 DIAGNOSIS — N80.9 ENDOMETRIOSIS: ICD-10-CM

## 2024-01-16 DIAGNOSIS — Z01.818 PREOP GENERAL PHYSICAL EXAM: Primary | ICD-10-CM

## 2024-01-16 PROCEDURE — 99213 OFFICE O/P EST LOW 20 MIN: CPT

## 2024-01-16 SDOH — HEALTH STABILITY: PHYSICAL HEALTH: ON AVERAGE, HOW MANY DAYS PER WEEK DO YOU ENGAGE IN MODERATE TO STRENUOUS EXERCISE (LIKE A BRISK WALK)?: 5 DAYS

## 2024-01-16 ASSESSMENT — SOCIAL DETERMINANTS OF HEALTH (SDOH)
HOW OFTEN DO YOU ATTEND CHURCH OR RELIGIOUS SERVICES?: PATIENT DECLINED
DO YOU BELONG TO ANY CLUBS OR ORGANIZATIONS SUCH AS CHURCH GROUPS UNIONS, FRATERNAL OR ATHLETIC GROUPS, OR SCHOOL GROUPS?: YES
HOW OFTEN DO YOU GET TOGETHER WITH FRIENDS OR RELATIVES?: THREE TIMES A WEEK
HOW OFTEN DO YOU ATTENT MEETINGS OF THE CLUB OR ORGANIZATION YOU BELONG TO?: PATIENT DECLINED
IN A TYPICAL WEEK, HOW MANY TIMES DO YOU TALK ON THE PHONE WITH FAMILY, FRIENDS, OR NEIGHBORS?: THREE TIMES A WEEK

## 2024-01-16 ASSESSMENT — LIFESTYLE VARIABLES
HOW OFTEN DO YOU HAVE SIX OR MORE DRINKS ON ONE OCCASION: LESS THAN MONTHLY
HOW OFTEN DO YOU HAVE A DRINK CONTAINING ALCOHOL: MONTHLY OR LESS
HOW MANY STANDARD DRINKS CONTAINING ALCOHOL DO YOU HAVE ON A TYPICAL DAY: 1 OR 2
AUDIT-C TOTAL SCORE: 2
SKIP TO QUESTIONS 9-10: 0

## 2024-01-16 NOTE — PATIENT INSTRUCTIONS
Preparing for Your Surgery  Getting started  A nurse will call you to review your health history and instructions. They will give you an arrival time based on your scheduled surgery time. Please be ready to share:  Your doctor's clinic name and phone number  Your medical, surgical, and anesthesia history  A list of allergies and sensitivities  A list of medicines, including herbal treatments and over-the-counter drugs  Whether the patient has a legal guardian (ask how to send us the papers in advance)  Please tell us if you're pregnant--or if there's any chance you might be pregnant. Some surgeries may injure a fetus (unborn baby), so they require a pregnancy test. Surgeries that are safe for a fetus don't always need a test, and you can choose whether to have one.   If you have a child who's having surgery, please ask for a copy of Preparing for Your Child's Surgery.    Preparing for surgery  Within 10 to 30 days of surgery: Have a pre-op exam (sometimes called an H&P, or History and Physical). This can be done at a clinic or pre-operative center.  If you're having a , you may not need this exam. Talk to your care team.  At your pre-op exam, talk to your care team about all medicines you take. If you need to stop any medicines before surgery, ask when to start taking them again.  We do this for your safety. Many medicines can make you bleed too much during surgery. Some change how well surgery (anesthesia) drugs work.  Call your insurance company to let them know you're having surgery. (If you don't have insurance, call [phone].)  Call your clinic if there's any change in your health. This includes signs of a cold or flu (sore throat, runny nose, cough, rash, fever). It also includes a scrape or scratch near the surgery site.  If you have questions on the day of surgery, call your hospital or surgery center.  Eating and drinking guidelines  For your safety: Unless your surgeon tells you otherwise,  follow the guidelines below.  Eat and drink as usual until 8 hours before you arrive for surgery. After that, no food or milk.  Drink clear liquids until 2 hours before you arrive. These are liquids you can see through, like water, Gatorade, and Propel Water. They also include plain black coffee and tea (no cream or milk), candy, and breath mints. You can spit out gum when you arrive.  If you drink alcohol: Stop drinking it the night before surgery.  If your care team tells you to take medicine on the morning of surgery, it's okay to take it with a sip of water.  Preventing infection  Shower or bathe the night before and morning of your surgery. Follow the instructions your clinic gave you. (If no instructions, use regular soap.)  Don't shave or clip hair near your surgery site. We'll remove the hair if needed.  Don't smoke or vape the morning of surgery. You may chew nicotine gum up to 2 hours before surgery. A nicotine patch is okay.  Note: Some surgeries require you to completely quit smoking and nicotine. Check with your surgeon.  Your care team will make every effort to keep you safe from infection. We will:  Clean our hands often with soap and water (or an alcohol-based hand rub).  Clean the skin at your surgery site with a special soap that kills germs.  Give you a special gown to keep you warm. (Cold raises the risk of infection.)  Wear special hair covers, masks, gowns and gloves during surgery.  Give antibiotic medicine, if prescribed. Not all surgeries need antibiotics.  What to bring on the day of surgery  Photo ID and insurance card  Copy of your health care directive, if you have one  Glasses and hearing aids (bring cases)  You can't wear contacts during surgery  Inhaler and eye drops, if you use them (tell us about these when you arrive)  CPAP machine or breathing device, if you use them  A few personal items, if spending the night  If you have . . .  A pacemaker, ICD (cardiac defibrillator) or other  implant: Bring the ID card.  An implanted stimulator: Bring the remote control.  A legal guardian: Bring a copy of the certified (court-stamped) guardianship papers.  Please remove any jewelry, including body piercings. Leave jewelry and other valuables at home.  If you're going home the day of surgery  You must have a responsible adult drive you home. They should stay with you overnight as well.  If you don't have someone to stay with you, and you aren't safe to go home alone, we may keep you overnight. Insurance often won't pay for this.  After surgery  If it's hard to control your pain or you need more pain medicine, please call your surgeon's office.  Questions?   If you have any questions for your care team, list them here: _________________________________________________________________________________________________________________________________________________________________________ ____________________________________ ____________________________________ ____________________________________  For informational purposes only. Not to replace the advice of your health care provider. Copyright   2003, 2019 [employer]. All rights reserved. Clinically reviewed by Harmony Dunlap MD. [employer] 610038 - REV 12/22.    How to Take Your Medication Before Surgery   - triptans, migraine abortives: HOLD on day of surgery   - meloxicam (Mobic): HOLD 10 days before surgery.    - cetirizine and first generation antihistamines: HOLD on day of surgery.   - leukotriene Inhibitors: Continue without modifcation.   - Herbal medications and vitamins: HOLD 14 days prior to surgery.   -Topicals HOLD day of surgery   -Omeprazole continue as directed   -Discuss w/ OB if they would like you to hold Birth Control or not

## 2024-01-16 NOTE — PROGRESS NOTES
[facility]  [id number] [place] [id number]  Phone: [phone]  Primary Provider: Jocy Loredo  Pre-op Performing Provider:    RU LI IS      PREOPERATIVE EVALUATION:  Today's date: 1/16/2024    Sammie is a 48 year old, presenting for the following:  Pre-Op Exam (Pt wants her lungs rechecked- says last week when she was In she was wheezing)        1/16/2024    10:36 AM   Additional Questions   Roomed by Vivian Steiner       Surgical Information:  Surgery/Procedure: HYSTEROSCOPY,  DILATION AND CURETTAGE, POSSIBLE POLYPECTOMY WITH MYOSURE , COLPOSCOPY  Surgery Location: [facility]  Surgeon: Krystin Sen DO   Surgery Date: 1/31/24  Time of Surgery: 10:00 AM  Where patient plans to recover: At home with family  Fax number for surgical facility: Note does not need to be faxed, will be available electronically in Epic.    Assessment & Plan     The proposed surgical procedure is considered LOW risk.    (Z01.818) Preop general physical exam  (primary encounter diagnosis)  (R10.2) Pelvic pain in female  (N80.9) Endometriosis  Comment: OK to proceed w/ procedure. Discussed medications and hold times. No labs today, however, patient to follow up if OB at upcoming visit would like otherwise.  Patient fully understands and is agreeable with plan of care, at this point patient will follow up as needed unless acute concerns arise in the meantime.  Plan: ok to proceed w/ procedure.      - No identified additional risk factors other than previously addressed    Antiplatelet or Anticoagulation Medication Instructions:   - Patient is on no antiplatelet or anticoagulation medications.    Additional Medication Instructions:   - triptans, migraine abortives: HOLD on day of surgery   - meloxicam (Mobic): HOLD 10 days before surgery.    - cetirizine and first generation antihistamines: HOLD on day of surgery.   - leukotriene Inhibitors:  Continue without modifcation.   - Herbal medications and vitamins: HOLD 14 days prior to surgery.   -Topicals HOLD day of surgery   -Birth Control: follow up w/ OB visit on 1/22/2024 to discuss if necessary to stop prior to procedure.     RECOMMENDATION:  APPROVAL GIVEN to proceed with proposed procedure, without further diagnostic evaluation.    25 minutes spent by me on the date of the encounter doing chart review, history and exam, documentation and further activities per the note      Subjective       HPI related to upcoming procedure: patient having endometriosis, plans to get biopsies w/ IUD insertion. Currently on oral birth control, unfortunately still having bleeding.         1/16/2024    10:28 AM   Preop Questions   1. Have you ever had a heart attack or stroke? No   2. Have you ever had surgery on your heart or blood vessels, such as a stent placement, a coronary artery bypass, or surgery on an artery in your head, neck, heart, or legs? No   3. Do you have chest pain with activity? No   4. Do you have a history of  heart failure? No   5. Do you currently have a cold, bronchitis or symptoms of other infection? No   6. Do you have a cough, shortness of breath, or wheezing? No   7. Do you or anyone in your family have previous history of blood clots? No   8. Do you or does anyone in your family have a serious bleeding problem such as prolonged bleeding following surgeries or cuts? No   9. Have you ever had problems with anemia or been told to take iron pills? No   10. Have you had any abnormal blood loss such as black, tarry or bloody stools, or abnormal vaginal bleeding? No   11. Have you ever had a blood transfusion? No   12. Are you willing to have a blood transfusion if it is medically needed before, during, or after your surgery? Yes   13. Have you or any of your relatives ever had problems with anesthesia? No   14. Do you have sleep apnea, excessive snoring or daytime drowsiness? No   15. Do you have  any artifical heart valves or other implanted medical devices like a pacemaker, defibrillator, or continuous glucose monitor? No   16. Do you have artificial joints? No   17. Are you allergic to latex? No   18. Is there any chance that you may be pregnant? No       Health Care Directive:  Patient does not have a Health Care Directive or Living Will: Discussed advance care planning with patient; however, patient declined at this time.    Preoperative Review of :   reviewed - no record of controlled substances prescribed.      Status of Chronic Conditions:  See problem list for active medical problems.  Problems all longstanding and stable, except as noted/documented.  See ROS for pertinent symptoms related to these conditions.    Review of Systems  CONSTITUTIONAL: NEGATIVE for fever, chills, change in weight  INTEGUMENTARY/SKIN: NEGATIVE for worrisome rashes, moles or lesions  EYES: NEGATIVE for vision changes or irritation  ENT/MOUTH: NEGATIVE for ear, mouth and throat problems  RESP: NEGATIVE for significant cough or SOB  CV: NEGATIVE for chest pain, palpitations or peripheral edema  GI: NEGATIVE for nausea, abdominal pain, heartburn, or change in bowel habits  : NEGATIVE for frequency, dysuria, or hematuria  MUSCULOSKELETAL: NEGATIVE for significant arthralgias or myalgia  NEURO: NEGATIVE for weakness, dizziness or paresthesias  ENDOCRINE: NEGATIVE for temperature intolerance, skin/hair changes  HEME: NEGATIVE for bleeding problems  PSYCHIATRIC: NEGATIVE for changes in mood or affect    Patient Active Problem List    Diagnosis Date Noted    Pain in joint of right shoulder 12/02/2020     Priority: Medium    Migraine without aura and without status migrainosus, not intractable 09/03/2013     Priority: Medium     more headaches with maxalt but works really well, hung over feeling on imitrex, stopped propranolol due to side effects, consider amitriptyline to see if helps decreased frequency.      Seasonal  allergic rhinitis 02/12/2013     Priority: Medium    Sensorineural hearing loss, bilateral 05/24/2012     Priority: Medium    S/P LEEP of cervix 02/14/2012     Priority: Medium     Hx of LEEP x 2  2/14/12: LSIL  10/3/12: NIL.  Plan pap in 1 yr.  10/2013 ASCUS neg HPV.  Plan pap in 1 yr. Per [facility] algorithms, needs two negative cotests at 12 & 24 months after LEEP before going to cotest in 3 yrs.  10/2014: NIL pap, neg HPV. Plan cotest pap & HPV in 1 year. Tracking started.  2/9/2016 Ascus Neg HPV, plan: cotest one year per MD. Tracking.   05/01/17: NIL pap, Neg HR HPV result. Plan cotest in 1 year per provider.  5/15/18 AGC - Endocervical cells, Neg HPV. Plan colp and endometrial sampling per guidelines.   06/14/18: [place] bx Suggestive of LSIL ROBERT-1 and Cervicitis. ECC Neg for dyplasia. EMB neg for dysplasia. Plan cotest in 1 year.   07/19/19 Patient is lost to pap tracking follow-up.   2/10/20 NIL pap, Neg HPV. Plan cotest in 1 year.    03/1/21 NIL, +HR HPV, not 16/18. Plan [place] bef 6/1/21 5/24/21 [place] Bx & ECC: benign. Plan 1 year cotest  8/15/22 LSIL Pap, neg HPV. Plan: colp by 11/15/22  9/28/22 COLP and ECC- Negative for dysplasia. Plan cotest in 1 year due by 9/28/23  10/5/23 ASCUS, Neg HPV. Plan [place] bef 1/5/24  10/13/23 Phone rang and rang with no answer. MyChart sent.   10/16/23 Left msg with use of  to call back.   10/20/23 Call to pt with . LM for pt to call back and/or check mychart . Pt viewed mychart  12/5/23 Message sent to provider   12/14/23 Pt aware that colpo will be done with other procedure.   1/10/24 hysteroscopy        Past Medical History:   Diagnosis Date    ASCUS (atypical squamous cells of undetermined significance) on Pap smear 10/13/2016    Neg HPV    History of colposcopy 06/14/2018 5/24/21    LSIL (low grade squamous intraepithelial lesion) on Pap smear 02/01/2014     Past Surgical History:   Procedure Laterality Date    DAVINCI PELVIC PROCEDURE Right  9/28/2022    Procedure: Xi Robotic  endometriosis resection/fulguration, cervical biopsy, and endometrial biopsy;  Surgeon: Krystin Sen DO;  Location: [facility] OR    LEEP TX, CERVICAL  x 2 per chart    historical    ORTHOPEDIC SURGERY  2005     Current Outpatient Medications   Medication Sig Dispense Refill    hydrocortisone, Perianal, (ANUSOL-HC) 2.5 % cream APPLY RECTALLY TO THE AFFECTED AREA TWICE DAILY (Patient taking differently: 2 times daily as needed) 60 g 1    hydrOXYzine (VISTARIL) 50 MG capsule Take 1 tablet every 6 hours as needed for itching (Patient not taking: Reported on 1/9/2024) 12 capsule 0    levocetirizine (XYZAL) 5 MG tablet Take 1 tablet (5 mg) by mouth every evening 30 tablet 1    meloxicam (MOBIC) 15 MG tablet Take 1 tablet (15 mg) by mouth daily 30 tablet 11    metoclopramide (REGLAN) 10 MG tablet Take 1 tablet (10 mg) by mouth 2 times daily as needed (migraines) 90 tablet 0    mometasone (ELOCON) 0.1 % external ointment Apply topically daily For up to 14 days 45 g 0    mometasone (NASONEX) 50 MCG/ACT nasal spray Spray 2 sprays into both nostrils daily 17 g 1    montelukast (SINGULAIR) 10 MG tablet Take 1 tablet (10 mg) by mouth At Bedtime 30 tablet 1    mupirocin (BACTROBAN) 2 % external ointment APPLY TOPICALLY TO THE AFFECTED AREA THREE TIMES DAILY 30 g 0    naloxone (NARCAN) 4 MG/0.1ML nasal spray Spray 1 spray (4 mg) into one nostril alternating nostrils as needed for opioid reversal every 2-3 minutes until assistance arrives (Patient not taking: Reported on 1/9/2024) 0.2 mL 0    norethindrone-ethinyl estradiol (MICROGESTIN 1/20) 1-20 MG-MCG tablet Take 1 tablet by mouth daily 90 tablet 3    omeprazole (PRILOSEC) 40 MG DR capsule TAKE 1 CAPSULE(40 MG) BY MOUTH DAILY 90 capsule 3    ondansetron (ZOFRAN) 8 MG tablet TAKE 1 TABLET(8 MG) BY MOUTH EVERY 6-8 HOURS AS NEEDED FOR NAUSEA 20 tablet 1    polyethylene glycol (MIRALAX) 17 GM/Dose powder Take 17 g (1 capful) by mouth daily  "(Patient taking differently: Take 1 capful. by mouth daily as needed) 500 g 0    rizatriptan (MAXALT) 10 MG tablet Take 1 tablet (10 mg) by mouth at onset of headache for migraine May repeat in 2 hours. Max 3 tablets/24 hours. 18 tablet 1    SUMAtriptan (IMITREX) 100 MG tablet Take 1 tablet (100 mg) by mouth at onset of headache for migraine that occur at night.  Take rizatriptan for migraines during the day. 9 tablet 3    triamcinolone (KENALOG) 0.1 % external cream APPLY SPARINGLY TOPICALLY TO THE AFFECTED AREA THREE TIMES DAILY FOR 14 DAYS 30 g 0       Allergies   Allergen Reactions    Amoxicillin      Rash, hives    Co-Trimoxazole [Sulfamethoxazole-Trimethoprim] Other (See Comments)     Yeast infection when taking this medication    Codeine Sulfate Nausea and Vomiting    Hydrocodone Nausea and Vomiting    Hydromorphone Itching     Light reaction, not severe    Lactose Diarrhea     dairy    Scopolamine Headache     Severe headache    Seasonal Allergies     Tramadol Headache and Nausea     Unsure if reaction was due to medication or COVID vaccine        Social History     Tobacco Use    Smoking status: Never    Smokeless tobacco: Never   Substance Use Topics    Alcohol use: Yes     Comment: wine 1-2 glasses on weekends     Family History   Problem Relation Age of Onset    Breast Cancer Mother     Family History Negative Mother     Family History Negative Father     Prostate Cancer Father     Liver Cancer Father     Breast Cancer Maternal Grandmother     Myocardial Infarction Paternal Grandmother     Myocardial Infarction Paternal Grandfather      History   Drug Use No         Objective     /80 (BP Location: Left arm, Patient Position: Sitting, Cuff Size: Adult Regular)   Pulse 70   Temp 98.7  F (37.1  C) (Oral)   Resp 16   Ht 1.626 m (5' 4\")   Wt 61.7 kg (136 lb 1.6 oz)   LMP 05/12/2023   SpO2 99%   BMI 23.36 kg/m      Physical Exam    GENERAL APPEARANCE: healthy, alert and no distress     EYES: " EOMI, PERRL     HENT: ear canals and TM's normal and nose and mouth without ulcers or lesions     NECK: no adenopathy, no asymmetry, masses, or scars and thyroid normal to palpation     RESP: lungs clear to auscultation - no rales, rhonchi or wheezes     CV: regular rates and rhythm, normal S1 S2, no S3 or S4 and no murmur, click or rub     ABDOMEN:  soft, nontender, no HSM or masses and bowel sounds normal     MS: extremities normal- no gross deformities noted, no evidence of inflammation in joints, FROM in all extremities.     SKIN: no suspicious lesions or rashes     NEURO: Normal strength and tone, sensory exam grossly normal, mentation intact and speech normal     PSYCH: mentation appears normal. and affect normal/bright     LYMPHATICS: No cervical adenopathy    Recent Labs   Lab Test 09/28/22  0820 08/15/22  1015   HGB 13.5 14.9   PLT  --  217   NA  --  134   POTASSIUM  --  3.7   CR  --  0.75        Diagnostics:  Labs pending at this time.  Results will be reviewed when available.   No EKG required for low risk surgery (cataract, skin procedure, breast biopsy, etc).    Revised Cardiac Risk Index (RCRI):  The patient has the following serious cardiovascular risks for perioperative complications:   - No serious cardiac risks = 0 points     RCRI Interpretation: 0 points: Class I (very low risk - 0.4% complication rate)    Signed Electronically by: JORGE Juárez CNP  Copy of this evaluation report is provided to requesting physician.

## 2024-01-22 ENCOUNTER — OFFICE VISIT (OUTPATIENT)
Dept: OBGYN | Facility: CLINIC | Age: 49
End: 2024-01-22
Payer: MEDICARE

## 2024-01-22 VITALS
BODY MASS INDEX: 23.25 KG/M2 | DIASTOLIC BLOOD PRESSURE: 70 MMHG | WEIGHT: 136.2 LBS | HEIGHT: 64 IN | SYSTOLIC BLOOD PRESSURE: 122 MMHG

## 2024-01-22 DIAGNOSIS — R10.2 PELVIC PAIN IN FEMALE: ICD-10-CM

## 2024-01-22 DIAGNOSIS — N80.9 ENDOMETRIOSIS: ICD-10-CM

## 2024-01-22 DIAGNOSIS — R93.89 ENDOMETRIAL THICKENING ON ULTRASOUND: Primary | ICD-10-CM

## 2024-01-22 PROCEDURE — 99213 OFFICE O/P EST LOW 20 MIN: CPT | Performed by: FAMILY MEDICINE

## 2024-01-22 NOTE — NURSING NOTE
"Chief Complaint   Patient presents with    Medication Follow-up     Stopped OCP in December       Initial /70   Ht 1.626 m (5' 4\")   Wt 61.8 kg (136 lb 3.2 oz)   LMP 2023   BMI 23.38 kg/m   Estimated body mass index is 23.38 kg/m  as calculated from the following:    Height as of this encounter: 1.626 m (5' 4\").    Weight as of this encounter: 61.8 kg (136 lb 3.2 oz).  BP completed using cuff size: regular    Questioned patient about current smoking habits.  Pt. has never smoked.          The following HM Due: NONE      "

## 2024-01-22 NOTE — PATIENT INSTRUCTIONS
Return for surgery.     Dr. Krystin Sen, DO    Obstetrics and Gynecology  [facility] - [place] and [place]

## 2024-01-22 NOTE — PROGRESS NOTES
SUBJECTIVE:  Sammie Ramirez is an 48 year old   woman who presents for   Gyn follow-up exam. She was previously seen for pelvic pain and history of endometriosis, doing much   Better on low dose combined Oral Contraceptive, planning hysteroscopy 23, having a slight headache and some   nausea.    Past Medical History:   Diagnosis Date    ASCUS (atypical squamous cells of undetermined significance) on Pap smear 10/13/2016    Neg HPV    History of colposcopy 2018    LSIL (low grade squamous intraepithelial lesion) on Pap smear 2014          Family History   Problem Relation Age of Onset    Breast Cancer Mother     Family History Negative Mother     Family History Negative Father     Prostate Cancer Father     Liver Cancer Father     Breast Cancer Maternal Grandmother     Myocardial Infarction Paternal Grandmother     Myocardial Infarction Paternal Grandfather        Past Surgical History:   Procedure Laterality Date    DAVINCI PELVIC PROCEDURE Right 2022    Procedure: Xi Robotic  endometriosis resection/fulguration, cervical biopsy, and endometrial biopsy;  Surgeon: Krystin Sen DO;  Location: [facility] OR    LEEP TX, CERVICAL  x 2 per chart    historical    ORTHOPEDIC SURGERY         Current Outpatient Medications   Medication    levocetirizine (XYZAL) 5 MG tablet    meloxicam (MOBIC) 15 MG tablet    hydrocortisone, Perianal, (ANUSOL-HC) 2.5 % cream    hydrOXYzine (VISTARIL) 50 MG capsule    metoclopramide (REGLAN) 10 MG tablet    mometasone (ELOCON) 0.1 % external ointment    mometasone (NASONEX) 50 MCG/ACT nasal spray    montelukast (SINGULAIR) 10 MG tablet    mupirocin (BACTROBAN) 2 % external ointment    naloxone (NARCAN) 4 MG/0.1ML nasal spray    norethindrone-ethinyl estradiol (MICROGESTIN ) 1-20 MG-MCG tablet    omeprazole (PRILOSEC) 40 MG DR capsule    ondansetron (ZOFRAN) 8 MG tablet    polyethylene glycol (MIRALAX) 17 GM/Dose powder    rizatriptan (MAXALT) 10  "MG tablet    SUMAtriptan (IMITREX) 100 MG tablet    triamcinolone (KENALOG) 0.1 % external cream     No current facility-administered medications for this visit.     Allergies   Allergen Reactions    Amoxicillin      Rash, hives    Co-Trimoxazole [Sulfamethoxazole-Trimethoprim] Other (See Comments)     Yeast infection when taking this medication    Codeine Sulfate Nausea and Vomiting    Hydrocodone Nausea and Vomiting    Hydromorphone Itching     Light reaction, not severe    Lactose Diarrhea     dairy    Scopolamine Headache     Severe headache    Seasonal Allergies     Tramadol Headache and Nausea     Unsure if reaction was due to medication or COVID vaccine       Social History     Tobacco Use    Smoking status: Never    Smokeless tobacco: Never   Substance Use Topics    Alcohol use: Yes     Comment: wine 1-2 glasses on weekends       Review Of Systems  Ears/Nose/Throat: negative  Respiratory: No shortness of breath, dyspnea on exertion, cough, or hemoptysis  Cardiovascular: negative  Gastrointestinal: negative  Genitourinary: See HPI   Constitutional, HEENT, cardiovascular, pulmonary, GI, , musculoskeletal, neuro, skin, endocrine and psych systems are negative, except as otherwise noted.      OBJECTIVE:  /70   Ht 1.626 m (5' 4\")   Wt 61.8 kg (136 lb 3.2 oz)   LMP 05/12/2023   BMI 23.38 kg/m    General appearance: healthy, alert, and no distress  Skin: Skin color, texture, turgor normal. No rashes or lesions.  Ears: negative  Nose/Sinuses: Nares normal. Septum midline. Mucosa normal. No drainage or sinus tenderness.  Oropharynx: Lips, mucosa, and tongue normal. Teeth and gums normal.  Neck: Neck supple. No adenopathy. Thyroid symmetric, normal size,, Carotids without bruits.  Lungs: negative, Percussion normal. Good diaphragmatic excursion. Lungs clear  Heart: negative, PMI normal. No lifts, heaves, or thrills. RRR. No murmurs, clicks gallops or rub      ASSESSMENT:  Sammie MARIA TERESA Ramirez is an 48 year old "   woman who presents for   Gyn follow-up exam. She was previously seen for pelvic pain and history of endometriosis, doing much   Better on low dose combined Oral Contraceptive, planning hysteroscopy 23, having a slight headache and some   nausea.    PLAN:  Dx: thickened endometrium, pelvic pain, hx of endometriosis  1)  Thickened endometrium:   planning hysteroscopy  2)  pelvic pain and hx of endometriosis: doing better on low dose Oral Contraceptive but now will like   To have IUD placed at time of hysteroscopy       Labs were reviewed in [facility]   Imaging was reviewed in Epic   Tests and documents were reviewed.   Discussion of management or test interpretation     Dr. Krystin Sen, DO    OB/GYN   [facility]

## 2024-01-24 ENCOUNTER — PREP FOR PROCEDURE (OUTPATIENT)
Dept: OBGYN | Facility: CLINIC | Age: 49
End: 2024-01-24
Payer: MEDICARE

## 2024-01-31 ENCOUNTER — ANESTHESIA (OUTPATIENT)
Dept: SURGERY | Facility: CLINIC | Age: 49
End: 2024-01-31
Payer: MEDICARE

## 2024-01-31 ENCOUNTER — ANESTHESIA EVENT (OUTPATIENT)
Dept: SURGERY | Facility: CLINIC | Age: 49
End: 2024-01-31
Payer: MEDICARE

## 2024-01-31 ENCOUNTER — OFFICE VISIT (OUTPATIENT)
Dept: INTERPRETER SERVICES | Facility: CLINIC | Age: 49
End: 2024-01-31

## 2024-01-31 ENCOUNTER — HOSPITAL ENCOUNTER (OUTPATIENT)
Facility: CLINIC | Age: 49
Discharge: HOME | End: 2024-01-31
Attending: FAMILY MEDICINE | Admitting: FAMILY MEDICINE
Payer: MEDICARE

## 2024-01-31 VITALS
OXYGEN SATURATION: 99 % | HEIGHT: 64 IN | SYSTOLIC BLOOD PRESSURE: 127 MMHG | RESPIRATION RATE: 16 BRPM | TEMPERATURE: 97 F | HEART RATE: 66 BPM | DIASTOLIC BLOOD PRESSURE: 74 MMHG | WEIGHT: 135.3 LBS | BODY MASS INDEX: 23.1 KG/M2

## 2024-01-31 DIAGNOSIS — Z98.890 STATUS POST HYSTEROSCOPY: Primary | ICD-10-CM

## 2024-01-31 LAB
ABO/RH(D): NORMAL
ANTIBODY SCREEN: NEGATIVE
HCG UR QL: NEGATIVE
HGB BLD-MCNC: 13.4 G/DL (ref 11.7–15.7)
SPECIMEN EXPIRATION DATE: NORMAL

## 2024-01-31 PROCEDURE — 370N000017 HC ANESTHESIA TECHNICAL FEE, PER MIN: Performed by: FAMILY MEDICINE

## 2024-01-31 PROCEDURE — 272N000001 HC OR GENERAL SUPPLY STERILE: Performed by: FAMILY MEDICINE

## 2024-01-31 PROCEDURE — 58558 HYSTEROSCOPY BIOPSY: CPT | Performed by: FAMILY MEDICINE

## 2024-01-31 PROCEDURE — 250N000025 HC SEVOFLURANE, PER MIN: Performed by: FAMILY MEDICINE

## 2024-01-31 PROCEDURE — 250N000011 HC RX IP 250 OP 636: Performed by: FAMILY MEDICINE

## 2024-01-31 PROCEDURE — 258N000003 HC RX IP 258 OP 636: Performed by: ANESTHESIOLOGY

## 2024-01-31 PROCEDURE — 710N000009 HC RECOVERY PHASE 1, LEVEL 1, PER MIN: Performed by: FAMILY MEDICINE

## 2024-01-31 PROCEDURE — 58300 INSERT INTRAUTERINE DEVICE: CPT | Mod: 51 | Performed by: FAMILY MEDICINE

## 2024-01-31 PROCEDURE — 258N000001 HC RX 258: Performed by: FAMILY MEDICINE

## 2024-01-31 PROCEDURE — 86900 BLOOD TYPING SEROLOGIC ABO: CPT | Performed by: FAMILY MEDICINE

## 2024-01-31 PROCEDURE — 88305 TISSUE EXAM BY PATHOLOGIST: CPT | Mod: TC | Performed by: FAMILY MEDICINE

## 2024-01-31 PROCEDURE — T1013 SIGN LANG/ORAL INTERPRETER: HCPCS | Mod: U3

## 2024-01-31 PROCEDURE — 250N000013 HC RX MED GY IP 250 OP 250 PS 637: Performed by: FAMILY MEDICINE

## 2024-01-31 PROCEDURE — 250N000011 HC RX IP 250 OP 636: Performed by: NURSE ANESTHETIST, CERTIFIED REGISTERED

## 2024-01-31 PROCEDURE — 36415 COLL VENOUS BLD VENIPUNCTURE: CPT | Performed by: FAMILY MEDICINE

## 2024-01-31 PROCEDURE — 250N000009 HC RX 250: Performed by: NURSE ANESTHETIST, CERTIFIED REGISTERED

## 2024-01-31 PROCEDURE — 85018 HEMOGLOBIN: CPT | Performed by: FAMILY MEDICINE

## 2024-01-31 PROCEDURE — 710N000012 HC RECOVERY PHASE 2, PER MINUTE: Performed by: FAMILY MEDICINE

## 2024-01-31 PROCEDURE — 258N000003 HC RX IP 258 OP 636: Performed by: FAMILY MEDICINE

## 2024-01-31 PROCEDURE — 360N000076 HC SURGERY LEVEL 3, PER MIN: Performed by: FAMILY MEDICINE

## 2024-01-31 PROCEDURE — 250N000009 HC RX 250: Performed by: FAMILY MEDICINE

## 2024-01-31 PROCEDURE — 57454 BX/CURETT OF CERVIX W/SCOPE: CPT | Mod: 51 | Performed by: FAMILY MEDICINE

## 2024-01-31 PROCEDURE — 999N000141 HC STATISTIC PRE-PROCEDURE NURSING ASSESSMENT: Performed by: FAMILY MEDICINE

## 2024-01-31 PROCEDURE — 81025 URINE PREGNANCY TEST: CPT | Performed by: FAMILY MEDICINE

## 2024-01-31 DEVICE — IUD CONTRACEPTIVE DEVICE MIRENA 50419-4230-01: Type: IMPLANTABLE DEVICE | Site: CERVIX | Status: FUNCTIONAL

## 2024-01-31 RX ORDER — LIDOCAINE HYDROCHLORIDE 20 MG/ML
INJECTION, SOLUTION INFILTRATION; PERINEURAL PRN
Status: DISCONTINUED | OUTPATIENT
Start: 2024-01-31 | End: 2024-01-31

## 2024-01-31 RX ORDER — DEXAMETHASONE SODIUM PHOSPHATE 4 MG/ML
INJECTION, SOLUTION INTRA-ARTICULAR; INTRALESIONAL; INTRAMUSCULAR; INTRAVENOUS; SOFT TISSUE PRN
Status: DISCONTINUED | OUTPATIENT
Start: 2024-01-31 | End: 2024-01-31

## 2024-01-31 RX ORDER — ONDANSETRON 4 MG/1
4 TABLET, ORALLY DISINTEGRATING ORAL EVERY 30 MIN PRN
Status: DISCONTINUED | OUTPATIENT
Start: 2024-01-31 | End: 2024-01-31 | Stop reason: HOSPADM

## 2024-01-31 RX ORDER — LIDOCAINE 40 MG/G
CREAM TOPICAL
Status: DISCONTINUED | OUTPATIENT
Start: 2024-01-31 | End: 2024-01-31 | Stop reason: HOSPADM

## 2024-01-31 RX ORDER — CLINDAMYCIN PHOSPHATE 900 MG/50ML
900 INJECTION, SOLUTION INTRAVENOUS
Status: COMPLETED | OUTPATIENT
Start: 2024-01-31 | End: 2024-01-31

## 2024-01-31 RX ORDER — HYDROMORPHONE HYDROCHLORIDE 2 MG/1
2 TABLET ORAL EVERY 6 HOURS PRN
Qty: 10 TABLET | Refills: 0 | Status: SHIPPED | OUTPATIENT
Start: 2024-01-31 | End: 2024-02-03

## 2024-01-31 RX ORDER — OXYCODONE HYDROCHLORIDE 5 MG/1
5 TABLET ORAL
Status: DISCONTINUED | OUTPATIENT
Start: 2024-01-31 | End: 2024-01-31 | Stop reason: HOSPADM

## 2024-01-31 RX ORDER — FENTANYL CITRATE 50 UG/ML
INJECTION, SOLUTION INTRAMUSCULAR; INTRAVENOUS PRN
Status: DISCONTINUED | OUTPATIENT
Start: 2024-01-31 | End: 2024-01-31

## 2024-01-31 RX ORDER — HYDROMORPHONE HCL IN WATER/PF 6 MG/30 ML
0.4 PATIENT CONTROLLED ANALGESIA SYRINGE INTRAVENOUS EVERY 5 MIN PRN
Status: DISCONTINUED | OUTPATIENT
Start: 2024-01-31 | End: 2024-01-31 | Stop reason: HOSPADM

## 2024-01-31 RX ORDER — ONDANSETRON 2 MG/ML
INJECTION INTRAMUSCULAR; INTRAVENOUS PRN
Status: DISCONTINUED | OUTPATIENT
Start: 2024-01-31 | End: 2024-01-31

## 2024-01-31 RX ORDER — ONDANSETRON 4 MG/1
4 TABLET, ORALLY DISINTEGRATING ORAL EVERY 8 HOURS PRN
Qty: 4 TABLET | Refills: 0 | Status: SHIPPED | OUTPATIENT
Start: 2024-01-31 | End: 2024-03-27 | Stop reason: DRUGHIGH

## 2024-01-31 RX ORDER — IBUPROFEN 800 MG/1
800 TABLET, FILM COATED ORAL EVERY 6 HOURS PRN
Qty: 30 TABLET | Refills: 0 | Status: SHIPPED | OUTPATIENT
Start: 2024-01-31

## 2024-01-31 RX ORDER — ONDANSETRON 2 MG/ML
4 INJECTION INTRAMUSCULAR; INTRAVENOUS EVERY 30 MIN PRN
Status: DISCONTINUED | OUTPATIENT
Start: 2024-01-31 | End: 2024-01-31 | Stop reason: HOSPADM

## 2024-01-31 RX ORDER — ACETAMINOPHEN 325 MG/1
975 TABLET ORAL ONCE
Status: COMPLETED | OUTPATIENT
Start: 2024-01-31 | End: 2024-01-31

## 2024-01-31 RX ORDER — SODIUM CHLORIDE, SODIUM LACTATE, POTASSIUM CHLORIDE, CALCIUM CHLORIDE 600; 310; 30; 20 MG/100ML; MG/100ML; MG/100ML; MG/100ML
INJECTION, SOLUTION INTRAVENOUS CONTINUOUS
Status: DISCONTINUED | OUTPATIENT
Start: 2024-01-31 | End: 2024-01-31 | Stop reason: HOSPADM

## 2024-01-31 RX ORDER — IBUPROFEN 800 MG/1
800 TABLET, FILM COATED ORAL ONCE
Status: DISCONTINUED | OUTPATIENT
Start: 2024-01-31 | End: 2024-01-31 | Stop reason: HOSPADM

## 2024-01-31 RX ORDER — HYDROXYZINE HYDROCHLORIDE 25 MG/1
25 TABLET, FILM COATED ORAL 3 TIMES DAILY PRN
Qty: 30 TABLET | Refills: 0 | Status: SHIPPED | OUTPATIENT
Start: 2024-01-31

## 2024-01-31 RX ORDER — KETOROLAC TROMETHAMINE 30 MG/ML
30 INJECTION, SOLUTION INTRAMUSCULAR; INTRAVENOUS ONCE
Status: COMPLETED | OUTPATIENT
Start: 2024-01-31 | End: 2024-01-31

## 2024-01-31 RX ORDER — HYDROMORPHONE HYDROCHLORIDE 2 MG/1
2 TABLET ORAL
Status: DISCONTINUED | OUTPATIENT
Start: 2024-01-31 | End: 2024-01-31 | Stop reason: HOSPADM

## 2024-01-31 RX ORDER — ACETAMINOPHEN 325 MG/1
975 TABLET ORAL EVERY 6 HOURS PRN
Qty: 50 TABLET | Refills: 0 | Status: SHIPPED | OUTPATIENT
Start: 2024-01-31 | End: 2024-09-10

## 2024-01-31 RX ORDER — FENTANYL CITRATE 50 UG/ML
50 INJECTION, SOLUTION INTRAMUSCULAR; INTRAVENOUS EVERY 5 MIN PRN
Status: DISCONTINUED | OUTPATIENT
Start: 2024-01-31 | End: 2024-01-31 | Stop reason: HOSPADM

## 2024-01-31 RX ORDER — AMOXICILLIN 250 MG
1-2 CAPSULE ORAL 2 TIMES DAILY
Qty: 30 TABLET | Refills: 0 | Status: SHIPPED | OUTPATIENT
Start: 2024-01-31 | End: 2024-03-27

## 2024-01-31 RX ORDER — KETOROLAC TROMETHAMINE 30 MG/ML
INJECTION, SOLUTION INTRAMUSCULAR; INTRAVENOUS PRN
Status: DISCONTINUED | OUTPATIENT
Start: 2024-01-31 | End: 2024-01-31

## 2024-01-31 RX ORDER — OXYCODONE HYDROCHLORIDE 5 MG/1
5-10 TABLET ORAL EVERY 4 HOURS PRN
Qty: 6 TABLET | Refills: 0 | Status: SHIPPED | OUTPATIENT
Start: 2024-01-31 | End: 2024-01-31

## 2024-01-31 RX ORDER — ACETIC ACID 3 %
LIQUID (ML) MISCELLANEOUS PRN
Status: DISCONTINUED | OUTPATIENT
Start: 2024-01-31 | End: 2024-01-31 | Stop reason: HOSPADM

## 2024-01-31 RX ORDER — CLINDAMYCIN PHOSPHATE 900 MG/50ML
900 INJECTION, SOLUTION INTRAVENOUS SEE ADMIN INSTRUCTIONS
Status: DISCONTINUED | OUTPATIENT
Start: 2024-01-31 | End: 2024-01-31 | Stop reason: HOSPADM

## 2024-01-31 RX ORDER — HYDROMORPHONE HCL IN WATER/PF 6 MG/30 ML
0.2 PATIENT CONTROLLED ANALGESIA SYRINGE INTRAVENOUS EVERY 5 MIN PRN
Status: DISCONTINUED | OUTPATIENT
Start: 2024-01-31 | End: 2024-01-31 | Stop reason: HOSPADM

## 2024-01-31 RX ORDER — FENTANYL CITRATE 50 UG/ML
25 INJECTION, SOLUTION INTRAMUSCULAR; INTRAVENOUS EVERY 5 MIN PRN
Status: DISCONTINUED | OUTPATIENT
Start: 2024-01-31 | End: 2024-01-31 | Stop reason: HOSPADM

## 2024-01-31 RX ORDER — ACETAMINOPHEN 325 MG/1
975 TABLET ORAL ONCE
Status: DISCONTINUED | OUTPATIENT
Start: 2024-01-31 | End: 2024-01-31 | Stop reason: HOSPADM

## 2024-01-31 RX ORDER — FERRIC SUBSULFATE 0.21 G/G
LIQUID TOPICAL PRN
Status: DISCONTINUED | OUTPATIENT
Start: 2024-01-31 | End: 2024-01-31 | Stop reason: HOSPADM

## 2024-01-31 RX ORDER — PROPOFOL 10 MG/ML
INJECTION, EMULSION INTRAVENOUS PRN
Status: DISCONTINUED | OUTPATIENT
Start: 2024-01-31 | End: 2024-01-31

## 2024-01-31 RX ORDER — OXYCODONE HYDROCHLORIDE 5 MG/1
10 TABLET ORAL
Status: DISCONTINUED | OUTPATIENT
Start: 2024-01-31 | End: 2024-01-31 | Stop reason: HOSPADM

## 2024-01-31 RX ADMIN — LIDOCAINE HYDROCHLORIDE 40 MG: 20 INJECTION, SOLUTION INFILTRATION; PERINEURAL at 10:28

## 2024-01-31 RX ADMIN — KETOROLAC TROMETHAMINE 30 MG: 30 INJECTION, SOLUTION INTRAMUSCULAR; INTRAVENOUS at 09:28

## 2024-01-31 RX ADMIN — ACETAMINOPHEN 975 MG: 325 TABLET, FILM COATED ORAL at 09:07

## 2024-01-31 RX ADMIN — FENTANYL CITRATE 100 MCG: 50 INJECTION INTRAMUSCULAR; INTRAVENOUS at 10:28

## 2024-01-31 RX ADMIN — KETOROLAC TROMETHAMINE 15 MG: 30 INJECTION, SOLUTION INTRAMUSCULAR at 10:58

## 2024-01-31 RX ADMIN — SODIUM CHLORIDE, POTASSIUM CHLORIDE, SODIUM LACTATE AND CALCIUM CHLORIDE: 600; 310; 30; 20 INJECTION, SOLUTION INTRAVENOUS at 10:25

## 2024-01-31 RX ADMIN — CLINDAMYCIN PHOSPHATE 900 MG: 900 INJECTION, SOLUTION INTRAVENOUS at 09:24

## 2024-01-31 RX ADMIN — PROPOFOL 200 MG: 10 INJECTION, EMULSION INTRAVENOUS at 10:28

## 2024-01-31 RX ADMIN — ONDANSETRON 4 MG: 2 INJECTION INTRAMUSCULAR; INTRAVENOUS at 10:36

## 2024-01-31 RX ADMIN — DEXAMETHASONE SODIUM PHOSPHATE 4 MG: 4 INJECTION, SOLUTION INTRA-ARTICULAR; INTRALESIONAL; INTRAMUSCULAR; INTRAVENOUS; SOFT TISSUE at 10:36

## 2024-01-31 RX ADMIN — GENTAMICIN SULFATE 300 MG: 40 INJECTION, SOLUTION INTRAMUSCULAR; INTRAVENOUS at 10:06

## 2024-01-31 ASSESSMENT — ACTIVITIES OF DAILY LIVING (ADL)
ADLS_ACUITY_SCORE: 19

## 2024-01-31 NOTE — DISCHARGE INSTRUCTIONS
HYSTEROSCOPY DISCHARGE INSTRUCTIONS    DR. EMA ALCANTAR M.D.   CLINIC PHONE NUMBER:  [phone].  [facility]    You received Toradol, an IV form of Ibuprofen (Motrin) at 0930 AM.  Do not take any Ibuprofen products until after 3:30 PM.   Maximum acetaminophen (Tylenol) dose from all sources should not exceed 4 grams (4000 mg) per day. Last given at 7:30 AM. Next dose available after 1:30PM    RECOVERY:  YOU MAY HAVE CRAMPS AND BLEEDING FOR A SHORT TIME AFTER THE PROCEDURE.  THIS IS NORMAL.  USE PADS INSTEAD OF TAMPONS.    DON'T DOUCHE OR USE TAMPONS UNTIL YOUR HEALTHCARE PROVIDER SAYS IT IS OK (USUALLY TWO WEEKS).    DON'T USE ANY VAGINAL MEDICINES UNTIL YOU ARE TOLD IT'S OK.    DON'T HAVE SEX UNTIL YOU ARE TOLD IT'S OK.      WHEN TO CALL YOUR HEALTHCARE PROVIDER:  HEAVY BLEEDING (MORE THAN 1 PAD AN HOUR FOR 2 OR MORE HOURS).    A FEVER .4 F (38 C) OR HIGHER, OR WHAT IS DIRECTED BY YOUR PROVIDER.    INCREASING BELLY (ABDOMINAL) PAIN OR SORENESS.    BAD-SMELLING DISCHARGE.        FOLLOW-UP CARE:  SCHEDULE A FOLLOW-UP VISIT WITH YOUR HEALTHCARE PROVIDER UNLESS YOU ALREADY HAVE AN APPOINTMENT.  BASED ON YOUR TEST RESULTS, YOU MAY NEED MORE TREATMENT.      GENERAL ANESTHESIA OR SEDATION ADULT DISCHARGE INSTRUCTIONS   SPECIAL PRECAUTIONS FOR 24 HOURS AFTER SURGERY    IT IS NOT UNUSUAL TO FEEL LIGHT-HEADED OR FAINT, UP TO 24 HOURS AFTER SURGERY OR WHILE TAKING PAIN MEDICATION.  IF YOU HAVE THESE SYMPTOMS; SIT FOR A FEW MINUTES BEFORE STANDING AND HAVE SOMEONE ASSIST YOU WHEN YOU GET UP TO WALK OR USE THE BATHROOM.    YOU SHOULD REST AND RELAX FOR THE NEXT 24 HOURS AND YOU MUST MAKE ARRANGEMENTS TO HAVE SOMEONE STAY WITH YOU FOR AT LEAST 24 HOURS AFTER YOUR DISCHARGE.  AVOID HAZARDOUS AND STRENUOUS ACTIVITIES.  DO NOT MAKE IMPORTANT DECISIONS FOR 24 HOURS.    DO NOT DRIVE ANY VEHICLE OR OPERATE MECHANICAL EQUIPMENT FOR 24 HOURS FOLLOWING THE END OF YOUR SURGERY.  EVEN THOUGH YOU MAY FEEL NORMAL, YOUR  REACTIONS MAY BE AFFECTED BY THE MEDICATION YOU HAVE RECEIVED.    DO NOT DRINK ALCOHOLIC BEVERAGES FOR 24 HOURS FOLLOWING YOUR SURGERY.    DRINK CLEAR LIQUIDS (APPLE JUICE, GINGER ALE, 7-UP, BROTH, ETC.).  PROGRESS TO YOUR REGULAR DIET AS YOU FEEL ABLE.    YOU MAY HAVE A DRY MOUTH, A SORE THROAT, MUSCLES ACHES OR TROUBLE SLEEPING.  THESE SHOULD GO AWAY AFTER 24 HOURS.    CALL YOUR DOCTOR FOR ANY OF THE FOLLOWING:  SIGNS OF INFECTION (FEVER, GROWING TENDERNESS AT THE SURGERY SITE, A LARGE AMOUNT OF DRAINAGE OR BLEEDING, SEVERE PAIN, FOUL-SMELLING DRAINAGE, REDNESS OR SWELLING.    IT HAS BEEN OVER 8 TO 10 HOURS SINCE SURGERY AND YOU ARE STILL NOT ABLE TO URINATE (PASS WATER).    Follow up in 1-2 weeks   Call [phone] for post op concerns, this will get you to the answering service for the on-call doctor.     Things to be concerned with is: uncontrolled vomiting, bleeding that is more than spotting, pain that doesn't respond to oxycodone and ibuprofen and tylenol   Also fever or general unwell feeling or increased pain.     Also it is ok to please also call for any reason or concern!     Dr. Krystin Sen, DO    OB/GYN   [facility]

## 2024-01-31 NOTE — OP NOTE
PREOPERATIVE DIAGNOSIS: A 48-year-old  female with abnormal uterine bleeding, abnormal pap smear (ASCUS), pelvic pain and thickened endometrium.  POSTOPERATIVE DIAGNOSIS: A 48-year-old  female with abnormal uterine bleeding, abnormal pap smear (ASCUS), pelvic pain and thickened endometrium.  SURGEON: Dr. Krystin Sen  PROCEDURE:   1. Diagnostic hysteroscopy.   2. Endometrial polypectomy  3. Dilation and curettage  4. Colposcopy with biopsy and ECC (endocervical curttage  5. Mirena intrauterine device placement   INDICATIONS:A 48-year-old  female with abnormal uterine bleeding, abnormal pap smear (ASCUS), pelvic pain and thickened endometrium.  COMPLICATIONS: None apparent at time of procedure.   FLUID DEFICIT: < 200 ml    ESTIMATED BLOOD LOSS: Less than 10 ml  FINDINGS: Exam under anesthesia reveals a small anteverted uterus about 8 weeks' size.   Cervical findings:  aceto-white changes @ 12, 3, 9, ECC performed.   Hysteroscopic findings:  anterior endometrial polyp, posterior endometrial polyp, normal endometrium, bilateral normal tubal ostia,   EUA 8 weeks, IUD placed @ 8 cm  DESCRIPTION OF PROCEDURE: After informed consent was obtained, the patient was taken to the operating room where she was placed in dorsal supine position and placed under general anesthesia without difficulty. Exam under anesthesia reveals the findings above. The patient was prepped and draped in normal sterile fashion.   The bivalve speculum was placed in the patient's vaginal vault. A colposcopy was performed. Acetowhite changes noted @ 12, 3, 9 o'clock position about 2% of the cervix, and biopsies are taken at these placed.  An endocervical curettage is performed with ECC pap brush.  Anterior lip of the cervix was grasped with a single-tooth tenaculum. The uterus is sounded to 8 cm with an endometrial pipette. The cervix was dilated up to 5 mm and the hysteroscope was placed into the intrauterine cavity and there was  noted to be the findings above, the two polyps are removed and then a directed sharp curettage of the endometrium is performed. The hysteroscope is removed. Then a Mirena IUD placed without concerns or complications @ 8 cm, the string trimmed to approx 1 inch from the os and the speculum removed.  The previous cervical biopsies were treated with Monsels and hemostasis is noted.  The single-tooth tenaculum was removed from the patient's anterior lip of the cervix. Hemostasis of the cervix was noted.  Patient tolerated the procedure well. Sponge, lap, needle counts were correct x2. The patient was awakened from anesthesia and returned to the recovery room in stable condition.   EMA ALCANTAR DO

## 2024-01-31 NOTE — ANESTHESIA PROCEDURE NOTES
Airway       Patient location during procedure: OR       Procedure Start/Stop Times: 1/31/2024 10:30 AM  Staff -        CRNA: Roshan Watson APRN CRNA       Performed By: CRNAIndications and Patient Condition       Indications for airway management: ajit-procedural and airway protection       Induction type:intravenous       Mask difficulty assessment: 1 - vent by mask    Final Airway Details       Final airway type: supraglottic airway    Supraglottic Airway Details        Type: LMA       Brand: I-Gel       LMA size: 4    Post intubation assessment        Placement verified by: capnometry, equal breath sounds and chest rise        Number of attempts at approach: 1       Secured with: commercial tube irvin       Ease of procedure: easy       Dentition: Intact    Medication(s) Administered   Medication Administration Time: 1/31/2024 10:30 AM

## 2024-01-31 NOTE — ANESTHESIA PREPROCEDURE EVALUATION
Anesthesia Pre-Procedure Evaluation    Patient: Sammie Ramirez   MRN: [id number] : 1975        Procedure : Procedure(s):  Hysteroscopy, dilation and curettage, possible polypectomy with myosure, colposcopy, mirena intrauterine device insertion  COLPOSCOPY          Past Medical History:   Diagnosis Date    ASCUS (atypical squamous cells of undetermined significance) on Pap smear 10/13/2016    Neg HPV    History of colposcopy 2018    LSIL (low grade squamous intraepithelial lesion) on Pap smear 2014      Past Surgical History:   Procedure Laterality Date    DAVINCI PELVIC PROCEDURE Right 2022    Procedure: Xi Robotic  endometriosis resection/fulguration, cervical biopsy, and endometrial biopsy;  Surgeon: Krystin Sen DO;  Location: [facility] OR    LEEP TX, CERVICAL  x 2 per chart    historical    ORTHOPEDIC SURGERY        Allergies   Allergen Reactions    Amoxicillin      Rash, hives    Co-Trimoxazole [Sulfamethoxazole-Trimethoprim] Other (See Comments)     Yeast infection when taking this medication    Codeine Sulfate Nausea and Vomiting    Hydrocodone Nausea and Vomiting    Hydromorphone Itching     Light reaction, not severe    Lactose Diarrhea     dairy    Scopolamine Headache     Severe headache    Seasonal Allergies     Tramadol Headache and Nausea     Unsure if reaction was due to medication or COVID vaccine      Social History     Tobacco Use    Smoking status: Never    Smokeless tobacco: Never   Substance Use Topics    Alcohol use: Yes     Comment: wine 1-2 glasses on weekends      Wt Readings from Last 1 Encounters:   24 61.4 kg (135 lb 4.8 oz)        Anesthesia Evaluation   Pt has had prior anesthetic. Type: General.    No history of anesthetic complications       ROS/MED HX  ENT/Pulmonary:  - neg pulmonary ROS     Neurologic:  - neg neurologic ROS     Cardiovascular:  - neg cardiovascular ROS     METS/Exercise Tolerance:     Hematologic:  - neg hematologic   "ROS     Musculoskeletal:  - neg musculoskeletal ROS     GI/Hepatic:  - neg GI/hepatic ROS     Renal/Genitourinary:  - neg Renal ROS     Endo:  - neg endo ROS     Psychiatric/Substance Use:  - neg psychiatric ROS     Infectious Disease:  - neg infectious disease ROS     Malignancy:       Other:            Physical Exam    Airway        Mallampati: II   TM distance: > 3 FB   Neck ROM: full   Mouth opening: > 3 cm    Respiratory Devices and Support         Dental       (+) Minor Abnormalities - some fillings, tiny chips      Cardiovascular   cardiovascular exam normal          Pulmonary   pulmonary exam normal                OUTSIDE LABS:  CBC:   Lab Results   Component Value Date    WBC 4.2 08/15/2022    WBC 4.9 03/01/2021    HGB 13.5 09/28/2022    HGB 14.9 08/15/2022    HCT 44.8 08/15/2022    HCT 41.4 03/01/2021     08/15/2022     03/01/2021     BMP:   Lab Results   Component Value Date     08/15/2022     03/01/2021    POTASSIUM 3.7 08/15/2022    POTASSIUM 3.6 03/01/2021    CHLORIDE 103 08/15/2022    CHLORIDE 107 03/01/2021    CO2 23 08/15/2022    CO2 29 03/01/2021    BUN 10 08/15/2022    BUN 11 03/01/2021    CR 0.75 08/15/2022    CR 0.78 03/01/2021    GLC 94 08/15/2022    GLC 92 03/01/2021     COAGS: No results found for: \"PTT\", \"INR\", \"FIBR\"  POC:   Lab Results   Component Value Date    HCG Negative 09/28/2022     HEPATIC:   Lab Results   Component Value Date    ALBUMIN 3.5 08/15/2022    PROTTOTAL 7.2 08/15/2022    ALT 21 08/15/2022    AST 19 08/15/2022    ALKPHOS 48 08/15/2022    BILITOTAL 0.6 08/15/2022     OTHER:   Lab Results   Component Value Date    GITA 9.3 08/15/2022    TSH 0.68 08/15/2022       Anesthesia Plan    ASA Status:  1       Anesthesia Type: General.     - Airway: LMA   Induction: Intravenous.   Maintenance: Balanced.        Consents    Anesthesia Plan(s) and associated risks, benefits, and realistic alternatives discussed. Questions answered and patient/representative(s) " expressed understanding.     - Discussed:     - Discussed with:  Patient      - Extended Intubation/Ventilatory Support Discussed: No.      - Patient is DNR/DNI Status: No     Use of blood products discussed: No .     Postoperative Care    Pain management: IV analgesics, Oral pain medications, Multi-modal analgesia.   PONV prophylaxis: Ondansetron (or other 5HT-3), Dexamethasone or Solumedrol     Comments:               Roshan Bullock MD    I have reviewed the pertinent notes and labs in the chart from the past 30 days and (re)examined the patient.  Any updates or changes from those notes are reflected in this note.

## 2024-01-31 NOTE — ANESTHESIA POSTPROCEDURE EVALUATION
Patient: Sammie Ramirez    Procedure: Procedure(s):  Hysteroscopy, dilation and curettage, polypectomy with myosure, mirena intrauterine device insertion  COLPOSCOPY       Anesthesia Type:  General    Note:  Disposition: Outpatient   Postop Pain Control: Uneventful            Sign Out: Well controlled pain   PONV: No   Neuro/Psych: Uneventful            Sign Out: Acceptable/Baseline neuro status   Airway/Respiratory: Uneventful            Sign Out: Acceptable/Baseline resp. status   CV/Hemodynamics: Uneventful            Sign Out: Acceptable CV status; No obvious hypovolemia; No obvious fluid overload   Other NRE: NONE   DID A NON-ROUTINE EVENT OCCUR? No           Last vitals:  Vitals Value Taken Time   /76 01/31/24 1150   Temp 97  F (36.1  C) 01/31/24 1112   Pulse 68 01/31/24 1200   Resp 17 01/31/24 1200   SpO2 100 % 01/31/24 1200   Vitals shown include unfiled device data.    Electronically Signed By: Roshan Bullock MD  January 31, 2024  2:56 PM

## 2024-01-31 NOTE — BRIEF OP NOTE
[facility]    Brief Operative Note    Pre-operative diagnosis: Endometrial thickening on ultrasound [R93.89]  Post-operative diagnosis 47 y/o female with ASCUS pap, Abnormal uterine bleeding, pelvic pain, thickened endometrium s/p hysteroscopy dilation and curettage, endometrial polypectomy, colposcopy with biopsy and ECC, Mirena intrauterine device placement    Procedure: Hysteroscopy, dilation and curettage, polypectomy with myosure, mirena intrauterine device insertion, N/A - Vagina  COLPOSCOPY, N/A - Cervix    Surgeon: Surgeon(s) and Role:     * Krystin Sen DO - Primary  Anesthesia: General   Estimated Blood Loss: Less than 10 ml    Drains: None  Specimens:   ID Type Source Tests Collected by Time Destination   1 : CERVICAL BIOPSY 12 O'CLOCK Biopsy Cervix SURGICAL PATHOLOGY EXAM Krystin Sen,  1/31/2024 10:48 AM    2 : CERVICAL BIOPSY 9 O'CLOCK Biopsy Cervix SURGICAL PATHOLOGY EXAM Krystin Sen,  1/31/2024 10:50 AM    3 : CERVICAL BIOPSY 3 O'CLOCK Biopsy Cervix SURGICAL PATHOLOGY EXAM Krystin Sen,  1/31/2024 10:50 AM    4 : ENDOCERVICAL CURETTINGS - ECC Tissue Endocervix SURGICAL PATHOLOGY EXAM Krystin Sen,  1/31/2024 10:50 AM    5 : ENDOMETRIAL POLYPS AND CURETTINGS Polyp Endometrium SURGICAL PATHOLOGY EXAM Krystin Sen,  1/31/2024 10:55 AM      Findings:   Cervix:  aceto-white changes @ 12, 3, 9, ECC performed. Hysteroscopic findings:  anterior endometrial polyp, posterior endometrial polyp, normal endometrium, bilateral normal tubal ostia, EUA 8 weeks, IUD placed @ 8 cm .  Complications: None.  Implants:   Implant Name Type Inv. Item Serial No.  Lot No. LRB No. Used Action   IUD CONTRACEPTIVE DEVICE MIRENA [id number]  [id number] Contraceptive Device IUD CONTRACEPTIVE DEVICE MIRENA [id number]  HEBER  N/A 1 Implanted

## 2024-02-02 ENCOUNTER — TELEPHONE (OUTPATIENT)
Dept: OBGYN | Facility: CLINIC | Age: 49
End: 2024-02-02
Payer: MEDICARE

## 2024-02-02 LAB
PATH REPORT.COMMENTS IMP SPEC: NORMAL
PATH REPORT.FINAL DX SPEC: NORMAL
PATH REPORT.GROSS SPEC: NORMAL
PATH REPORT.MICROSCOPIC SPEC OTHER STN: NORMAL
PATH REPORT.RELEVANT HX SPEC: NORMAL
PHOTO IMAGE: NORMAL

## 2024-02-02 PROCEDURE — 88305 TISSUE EXAM BY PATHOLOGIST: CPT | Mod: 26 | Performed by: PATHOLOGY

## 2024-02-02 NOTE — TELEPHONE ENCOUNTER
Patient calling -    S/p hysteroscopy, dilation and curettage, polypectomy with myosure, mirena intrauterine device insertion with Dr. Sen 1/31/24.    Patient reports having cramping pain, more than expected.  Patient reports the cramping pain is mostly on her left side.  Has no incisions, procedure was entirely completed vaginally. Patient reports taking tylenol and ibuprofen on a schedule.  Patient reports having a normal bowel movement this morning.  Patient reports she is occasionally passing gas.  Did a fair around of moving around yesterday, but hasn't been doing as much today.    Encouraged patient to try alternating heat and ice, increasing activity as tolerated, pushing fluid intake, and adding simethicone.    Patient would like to have provider input on if she can add previously prescribed meloxicam to tylenol and ibuprofen regimen to help with cramping.  Would like to know if this is considered normal, or if there is anything further she can do.  Routing to on call provider, as primary provider is out of clinic until Wednesday.    Roxy LUCIANO RN

## 2024-02-02 NOTE — TELEPHONE ENCOUNTER
Returned call to patient, discussed recommendations from on call provider.  Patient verbalized understanding via , will return call if no improvement or worsening of pain, or developing of fever.    Roxy LUCIANO RN

## 2024-02-02 NOTE — TELEPHONE ENCOUNTER
Hello,    Some cramping after hysteroscopy is not uncommon. I would recommend that she continue to take scheduled mediations in addition to fluid, ice/heat, and rest. Sounds like she have overdone it last over the lasts few days? Also, she should not take both Meloxicam and Ibuprofen because these are both NSAIDs. If she would like to switch back to Meloxicam she could do that and then stop the ibuprofen. If the pain is not getting better or if she is developing fever then I would recommend she come in for evaluation.    Pawan Bagley MD

## 2024-02-07 ENCOUNTER — TELEPHONE (OUTPATIENT)
Dept: OBGYN | Facility: CLINIC | Age: 49
End: 2024-02-07

## 2024-02-07 ENCOUNTER — OFFICE VISIT (OUTPATIENT)
Dept: OBGYN | Facility: CLINIC | Age: 49
End: 2024-02-07
Payer: MEDICARE

## 2024-02-07 VITALS
SYSTOLIC BLOOD PRESSURE: 120 MMHG | WEIGHT: 137.7 LBS | BODY MASS INDEX: 23.51 KG/M2 | HEIGHT: 64 IN | DIASTOLIC BLOOD PRESSURE: 70 MMHG

## 2024-02-07 DIAGNOSIS — R10.2 PELVIC PAIN IN FEMALE: Primary | ICD-10-CM

## 2024-02-07 DIAGNOSIS — M54.50 ACUTE LEFT-SIDED LOW BACK PAIN WITHOUT SCIATICA: ICD-10-CM

## 2024-02-07 DIAGNOSIS — Z30.431 IUD CHECK UP: ICD-10-CM

## 2024-02-07 LAB
CLUE CELLS: ABNORMAL
TRICHOMONAS, WET PREP: ABNORMAL
WBC'S/HIGH POWER FIELD, WET PREP: ABNORMAL
YEAST, WET PREP: ABNORMAL

## 2024-02-07 PROCEDURE — 99213 OFFICE O/P EST LOW 20 MIN: CPT | Performed by: FAMILY MEDICINE

## 2024-02-07 PROCEDURE — 87210 SMEAR WET MOUNT SALINE/INK: CPT | Performed by: FAMILY MEDICINE

## 2024-02-07 RX ORDER — HYDROMORPHONE HYDROCHLORIDE 4 MG/1
TABLET ORAL
COMMUNITY
Start: 2024-01-31 | End: 2024-03-04

## 2024-02-07 RX ORDER — FLUCONAZOLE 150 MG/1
150 TABLET ORAL
Qty: 3 TABLET | Refills: 0 | Status: SHIPPED | OUTPATIENT
Start: 2024-02-07 | End: 2024-03-27

## 2024-02-07 RX ORDER — CYCLOBENZAPRINE HCL 10 MG
10 TABLET ORAL AT BEDTIME
Qty: 4 TABLET | Refills: 0 | Status: SHIPPED | OUTPATIENT
Start: 2024-02-07 | End: 2024-02-11

## 2024-02-07 RX ORDER — CLINDAMYCIN HCL 300 MG
300 CAPSULE ORAL 3 TIMES DAILY
Qty: 21 CAPSULE | Refills: 0 | Status: SHIPPED | OUTPATIENT
Start: 2024-02-07 | End: 2024-02-14

## 2024-02-07 RX ORDER — KETOROLAC TROMETHAMINE 10 MG/1
10 TABLET, FILM COATED ORAL EVERY 6 HOURS PRN
Qty: 20 TABLET | Refills: 0 | Status: SHIPPED | OUTPATIENT
Start: 2024-02-07 | End: 2024-03-04

## 2024-02-07 NOTE — NURSING NOTE
"Chief Complaint   Patient presents with    Surgical Followup     Lower back pain, headache/migraine, left cramping--level 8--tried Meloxicam and helped a little        Initial /70   Ht 1.626 m (5' 4\")   Wt 62.5 kg (137 lb 11.2 oz)   LMP 2023   BMI 23.64 kg/m   Estimated body mass index is 23.64 kg/m  as calculated from the following:    Height as of this encounter: 1.626 m (5' 4\").    Weight as of this encounter: 62.5 kg (137 lb 11.2 oz).  BP completed using cuff size: regular    Questioned patient about current smoking habits.  Pt. has never smoked.          The following HM Due: NONE    "
19-May-2022 22:50

## 2024-02-07 NOTE — PROGRESS NOTES
"Subjective: 48 year old female status post    1. Diagnostic hysteroscopy.   2. Endometrial polypectomy  3. Dilation and curettage  4. Colposcopy with biopsy and ECC (endocervical curttage  5. Mirena intrauterine device placement  here for post op check.      Having slight bleeding, slight period.    Usually gets back pain with period lasting about 2 days.  But now for a week with the post op bleeding  But having 8/10 pain in left adnexa and back pain starting since the surgery     Objective:  EXAM:  /70   Ht 1.626 m (5' 4\")   Wt 62.5 kg (137 lb 11.2 oz)   LMP 05/12/2023   BMI 23.64 kg/m    Constitutional: healthy, alert and no distress  GYN PELVIC: NEGATIVE, normal external genitalia,  normal vaginal mucosa, normal cervix and normal uterus, adnexa, no masses or tenderness IUD in place, no vaginal discharge         Assessment/Plan:    48 year old female status post    1. Diagnostic hysteroscopy.   2. Endometrial polypectomy  3. Dilation and curettage  4. Colposcopy with biopsy and ECC (endocervical curttage  5. Mirena intrauterine device placement  here for post op check.      Having slight bleeding, slight period.    But having 8/10 pain in left adnexa and back pain starting since the surgery   V67.00C Surgery Follow-Up Examination  (primary encounter diagnosis)  Comment:    Plan: pelvic ultrasound ordered for IUD check   Vaginal swab check   Rx clindamycin and diflucan for empiric treatment of ? Infection   Suspect IUD causing uterine cramping, that will resolve over time,         Dr. Krystin Sen, DO    Obstetrics and Gynecology  [facility] [place] and [place]          "

## 2024-02-07 NOTE — TELEPHONE ENCOUNTER
Call from pt with , pt had hysteroscopy on 1/31, called on 2/2 for post-op pain with on-call provider's recommendations, pt calling today with complaint that pain is not getting better and is getting worse, noted as abdominal cramping that is constant.     Appt made for eval today.    ZEINA Miramontes

## 2024-02-07 NOTE — PATIENT INSTRUCTIONS
Return next Wednesday as scheduled       Dr. Krystin Sen,     Obstetrics and Gynecology  [facility] [place] and [place]

## 2024-02-08 ENCOUNTER — PATIENT OUTREACH (OUTPATIENT)
Dept: OBGYN | Facility: CLINIC | Age: 49
End: 2024-02-08
Payer: MEDICARE

## 2024-02-09 ENCOUNTER — ANCILLARY PROCEDURE (OUTPATIENT)
Dept: ULTRASOUND IMAGING | Facility: CLINIC | Age: 49
End: 2024-02-09
Attending: FAMILY MEDICINE
Payer: MEDICARE

## 2024-02-09 DIAGNOSIS — R10.2 PELVIC PAIN IN FEMALE: ICD-10-CM

## 2024-02-09 DIAGNOSIS — Z30.431 IUD CHECK UP: ICD-10-CM

## 2024-02-09 PROCEDURE — 76856 US EXAM PELVIC COMPLETE: CPT | Performed by: OBSTETRICS & GYNECOLOGY

## 2024-02-09 PROCEDURE — 76830 TRANSVAGINAL US NON-OB: CPT | Performed by: OBSTETRICS & GYNECOLOGY

## 2024-02-14 ENCOUNTER — OFFICE VISIT (OUTPATIENT)
Dept: OBGYN | Facility: CLINIC | Age: 49
End: 2024-02-14
Payer: MEDICARE

## 2024-02-14 VITALS
DIASTOLIC BLOOD PRESSURE: 78 MMHG | SYSTOLIC BLOOD PRESSURE: 124 MMHG | BODY MASS INDEX: 23.37 KG/M2 | WEIGHT: 136.9 LBS | HEIGHT: 64 IN

## 2024-02-14 DIAGNOSIS — Z98.890 STATUS POST HYSTEROSCOPY: Primary | ICD-10-CM

## 2024-02-14 PROCEDURE — 99213 OFFICE O/P EST LOW 20 MIN: CPT | Performed by: FAMILY MEDICINE

## 2024-02-14 NOTE — PATIENT INSTRUCTIONS
Return after tobi Anderson (new IUD)     Dr. Krystin Sen,     Obstetrics and Gynecology  [facility] - [place] and [place]

## 2024-02-14 NOTE — NURSING NOTE
"Chief Complaint   Patient presents with    Surgical Followup     Pelvic US 24--Rx for clindamycin and diflucan given 24       Initial /78   Ht 1.626 m (5' 4\")   Wt 62.1 kg (136 lb 14.4 oz)   LMP 2023   BMI 23.50 kg/m   Estimated body mass index is 23.5 kg/m  as calculated from the following:    Height as of this encounter: 1.626 m (5' 4\").    Weight as of this encounter: 62.1 kg (136 lb 14.4 oz).  BP completed using cuff size: regular    Questioned patient about current smoking habits.  Pt. has never smoked.          The following HM Due: NONE  "
No

## 2024-02-14 NOTE — PROGRESS NOTES
"Subjective: 48 year old female status post hysteroscopy with polypectomy with IUD placement on 1/31/2024, here for incision check.  Doing well, denies fever, significant pain.   Is having tailbone pain since the surgery which resembles pain that comes for 2 days during her period. The pain is continuous 4-5/10.  She has never had this pain except during the period.   She notes one of the medication helps with the cramping/back pain.     Objective:  EXAM:  /78   Ht 1.626 m (5' 4\")   Wt 62.1 kg (136 lb 14.4 oz)   LMP 05/12/2023   BMI 23.50 kg/m    Constitutional: healthy, alert and no distress    Assessment/Plan: 48 year old female status post hysteroscopy with polypectomy with IUD placement on 1/31/2024, here for incision check.    V67.00C Surgery Follow-Up Examination  (primary encounter diagnosis)  Comment:    Plan: IUD with cramping.    Consider observation, treatment with toradol.   Consider removing Mirena IUD and replacing with anai, perhaps the mirena is irritating the myometrium   Us shows normal placement     If nothing works, she wants to consider hysterectomy, with her history of endometriosis if the IUD doesn't work, this would be   The next step.       Dr. Krystin Sen, DO    Obstetrics and Gynecology  [facility] - [place] and [place]         "

## 2024-02-19 ENCOUNTER — TELEPHONE (OUTPATIENT)
Dept: GASTROENTEROLOGY | Facility: CLINIC | Age: 49
End: 2024-02-19
Payer: MEDICARE

## 2024-02-19 DIAGNOSIS — Z00.00 ENCOUNTER FOR PREVENTATIVE ADULT HEALTH CARE EXAMINATION: ICD-10-CM

## 2024-02-19 NOTE — TELEPHONE ENCOUNTER
Reason for Reschedule/Cancellation   (please be detailed, any staff messages or encounters to note?): KATHY      Prior to reschedule please review:  Ordering Provider: KATHARINE  Sedation Determined: MODERATE  Does patient have any ASC Exclusions, please identify?: N      Notes on Cancelled Procedure:  Procedure: Lower Endoscopy [Colonoscopy]   Date: 04/22/2024  Location: [facility]; [address] [place] [address]  Surgeon: KATHY      Rescheduled: No, 1ST ATTEMPT TO RESCHEDULE, LVM TO CALL  BACK, CASE IN DEPOT       Did you cancel or rescheduled an EUS procedure? No.

## 2024-02-20 ENCOUNTER — TELEPHONE (OUTPATIENT)
Dept: GASTROENTEROLOGY | Facility: CLINIC | Age: 49
End: 2024-02-20
Payer: MEDICARE

## 2024-02-20 RX ORDER — OMEPRAZOLE 40 MG/1
CAPSULE, DELAYED RELEASE ORAL
Qty: 90 CAPSULE | Refills: 3 | Status: SHIPPED | OUTPATIENT
Start: 2024-02-20

## 2024-02-20 NOTE — TELEPHONE ENCOUNTER
Caller: Sammie Ramirez   Reason for Reschedule/Cancellation (please be detailed, any staff messages or encounters to note?):     Provider is VEDA (Melisa)       Rescheduled: yes   Procedure: Lower Endoscopy [Colonoscopy]  Date: 06/03/2024  Location:[facility]; [address] [place] [address]  Surgeon: melisa   Sedation Level Scheduled  moderate          Reason for Sedation Level per order   Prep/Instructions updated and sent: Traction        Send In - basket message to Panc - Kamron Pool if EUS procedure is canceled or rescheduled: [ N/A, YES or NO] n/a

## 2024-03-04 ENCOUNTER — OFFICE VISIT (OUTPATIENT)
Dept: OBGYN | Facility: CLINIC | Age: 49
End: 2024-03-04
Payer: MEDICARE

## 2024-03-04 VITALS
DIASTOLIC BLOOD PRESSURE: 70 MMHG | HEIGHT: 64 IN | WEIGHT: 135.8 LBS | BODY MASS INDEX: 23.18 KG/M2 | SYSTOLIC BLOOD PRESSURE: 128 MMHG

## 2024-03-04 DIAGNOSIS — N80.9 ENDOMETRIOSIS: ICD-10-CM

## 2024-03-04 DIAGNOSIS — N95.1 PERIMENOPAUSE: ICD-10-CM

## 2024-03-04 DIAGNOSIS — R11.2 NON-INTRACTABLE VOMITING WITH NAUSEA: ICD-10-CM

## 2024-03-04 DIAGNOSIS — R10.2 PELVIC PAIN IN FEMALE: Primary | ICD-10-CM

## 2024-03-04 PROCEDURE — 99213 OFFICE O/P EST LOW 20 MIN: CPT | Performed by: FAMILY MEDICINE

## 2024-03-04 RX ORDER — ONDANSETRON 8 MG/1
TABLET, FILM COATED ORAL
Qty: 20 TABLET | Refills: 1 | Status: SHIPPED | OUTPATIENT
Start: 2024-03-04

## 2024-03-04 RX ORDER — KETOROLAC TROMETHAMINE 10 MG/1
10 TABLET, FILM COATED ORAL EVERY 6 HOURS PRN
Qty: 20 TABLET | Refills: 1 | Status: SHIPPED | OUTPATIENT
Start: 2024-03-04 | End: 2024-06-19

## 2024-03-04 NOTE — PATIENT INSTRUCTIONS
Return yearly or as needed     Dr. Krystin Sen, DO    Obstetrics and Gynecology  [facility] - [place] and [place]

## 2024-03-04 NOTE — NURSING NOTE
"Chief Complaint   Patient presents with    RECHECK     Discuss results from biopsy--feeling much better       Initial /70   Ht 1.626 m (5' 4\")   Wt 61.6 kg (135 lb 12.8 oz)   LMP 2023   BMI 23.31 kg/m   Estimated body mass index is 23.31 kg/m  as calculated from the following:    Height as of this encounter: 1.626 m (5' 4\").    Weight as of this encounter: 61.6 kg (135 lb 12.8 oz).  BP completed using cuff size: regular    Questioned patient about current smoking habits.  Pt. has never smoked.          The following HM Due: NONE  "

## 2024-03-04 NOTE — PROGRESS NOTES
SUBJECTIVE:  Sammie Ramirez is an 48 year old   woman who presents for   Gyn follow-up exam. She was previously seen for pelvic pain and abnormal uterine bleeding, hx of laparoscopy for endometriosis 2022,   Recent hysteroscopy for thickened lining with IUD placement with normal endometrium on pathology   last post op visit, continues to complain of pelvic pain.   Has tried multiple Oral Contraceptives with some relief, but a lot of side effects of nausea and AUB.    She reports the IUD has finally helped with pelvic pain and endometriosis and bleeding.  She is so very happy this is working.     Past Medical History:   Diagnosis Date    ASCUS (atypical squamous cells of undetermined significance) on Pap smear 10/13/2016    Neg HPV    History of colposcopy 2018    LSIL (low grade squamous intraepithelial lesion) on Pap smear 2014          Family History   Problem Relation Age of Onset    Breast Cancer Mother     Family History Negative Mother     Family History Negative Father     Prostate Cancer Father     Liver Cancer Father     Breast Cancer Maternal Grandmother     Myocardial Infarction Paternal Grandmother     Myocardial Infarction Paternal Grandfather        Past Surgical History:   Procedure Laterality Date    COLPOSCOPY, BIOPSY, COMBINED N/A 2024    Procedure: COLPOSCOPY;  Surgeon: Krystin Sen DO;  Location: [facility] OR    DAVINCI PELVIC PROCEDURE Right 2022    Procedure: Xi Robotic  endometriosis resection/fulguration, cervical biopsy, and endometrial biopsy;  Surgeon: Krystin Sen DO;  Location: [facility] OR    DILATION AND CURETTAGE, OPERATIVE HYSTEROSCOPY WITH MORCELLATOR, COMBINED N/A 2024    Procedure: Hysteroscopy, dilation and curettage, polypectomy with myosure, mirena intrauterine device insertion;  Surgeon: Krystin Sen DO;  Location: [facility] OR    INSERT INTRAUTERINE DEVICE N/A 2024    Procedure: Insert intrauterine device;  Surgeon:  Krystin Sen, DO;  Location: [facility] OR    LEEP TX, CERVICAL  x 2 per chart    historical    ORTHOPEDIC SURGERY  2005       Current Outpatient Medications   Medication    acetaminophen (TYLENOL) 325 MG tablet    fluconazole (DIFLUCAN) 150 MG tablet    hydrocortisone, Perianal, (ANUSOL-HC) 2.5 % cream    hydrOXYzine (VISTARIL) 50 MG capsule    hydrOXYzine HCl (ATARAX) 25 MG tablet    ibuprofen (ADVIL/MOTRIN) 800 MG tablet    ketorolac (TORADOL) 10 MG tablet    levocetirizine (XYZAL) 5 MG tablet    meloxicam (MOBIC) 15 MG tablet    metoclopramide (REGLAN) 10 MG tablet    mometasone (ELOCON) 0.1 % external ointment    mometasone (NASONEX) 50 MCG/ACT nasal spray    montelukast (SINGULAIR) 10 MG tablet    mupirocin (BACTROBAN) 2 % external ointment    naloxone (NARCAN) 4 MG/0.1ML nasal spray    omeprazole (PRILOSEC) 40 MG DR capsule    ondansetron (ZOFRAN ODT) 4 MG ODT tab    ondansetron (ZOFRAN) 8 MG tablet    polyethylene glycol (MIRALAX) 17 GM/Dose powder    rizatriptan (MAXALT) 10 MG tablet    senna-docusate (SENOKOT-S/PERICOLACE) 8.6-50 MG tablet    SUMAtriptan (IMITREX) 100 MG tablet    triamcinolone (KENALOG) 0.1 % external cream     No current facility-administered medications for this visit.     Allergies   Allergen Reactions    Amoxicillin      Rash, hives    Co-Trimoxazole [Sulfamethoxazole-Trimethoprim] Other (See Comments)     Yeast infection when taking this medication    Codeine Sulfate Nausea and Vomiting    Hydrocodone Nausea and Vomiting    Hydromorphone Itching     Light reaction, not severe    Lactose Diarrhea     dairy    Oxycodone Hives and Itching    Scopolamine Headache     Severe headache    Seasonal Allergies     Tramadol Headache and Nausea     Unsure if reaction was due to medication or COVID vaccine       Social History     Tobacco Use    Smoking status: Never    Smokeless tobacco: Never   Substance Use Topics    Alcohol use: Yes     Comment: wine 1-2 glasses on weekends       Review Of  "Systems  Ears/Nose/Throat: negative  Respiratory: No shortness of breath, dyspnea on exertion, cough, or hemoptysis  Cardiovascular: negative  Gastrointestinal: negative  Genitourinary: See HPI   Constitutional, HEENT, cardiovascular, pulmonary, GI, , musculoskeletal, neuro, skin, endocrine and psych systems are negative, except as otherwise noted.      OBJECTIVE:  /70   Ht 1.626 m (5' 4\")   Wt 61.6 kg (135 lb 12.8 oz)   LMP 2023   BMI 23.31 kg/m    General appearance: healthy, alert, and no distress  Skin: Skin color, texture, turgor normal. No rashes or lesions.    ASSESSMENT:  Sammie Ramirez is an 48 year old   woman who presents for   Gyn follow-up exam. She was previously seen for pelvic pain and abnormal uterine bleeding, hx of laparoscopy for endometriosis 2022,   Recent hysteroscopy for thickened lining with IUD placement with normal endometrium on pathology   last post op visit, continues to complain of pelvic pain.   Has tried multiple Oral Contraceptives with some relief, but a lot of side effects of nausea and AUB.    She reports the IUD has finally helped with pelvic pain and endometriosis and bleeding.  She is so very happy this is working.     PLAN:  Dx: Endometriosis, pelvic pain  1)  Endometriosis, pelvic pain now treated well with IUD in place.   2)  ROBERT 1: repeat pap in 1 year with HPV  3)  Endometrial polypectomy with normal endometrium  4) Refill toradol and zofran prn        Labs were reviewed in [facility]   Imaging was reviewed in Epic   Tests and documents were reviewed.   Discussion of management or test interpretation   Dr. Krystin Sen, DO    OB/GYN   [facility]     "

## 2024-03-05 ENCOUNTER — TELEPHONE (OUTPATIENT)
Dept: OBGYN | Facility: CLINIC | Age: 49
End: 2024-03-05
Payer: MEDICARE

## 2024-03-05 DIAGNOSIS — N80.9 ENDOMETRIOSIS: ICD-10-CM

## 2024-03-05 DIAGNOSIS — R10.2 PELVIC PAIN IN FEMALE: Primary | ICD-10-CM

## 2024-03-05 NOTE — TELEPHONE ENCOUNTER
DRUG CHANGE REQUEST    Prescribed: KETOROLAC 10mg tablets  SIG: Take 1 tablet by mouth every 6 hours as needed for moderate pain    MESSAGE:  Drug not covered by patient plan. The preferred alternative is NAPROXEN, IBUPROFEN, MELOXICAM, NABUMETONE, ETODOLAC. Please call/fax the pharmacy to change the medication along with strength, directions, quantity and refills.    PHARMACY:    [facility]  [address] [place], [place] [address]  PH: [phone]  FAX: [phone]

## 2024-03-06 ENCOUNTER — ANCILLARY PROCEDURE (OUTPATIENT)
Dept: MAMMOGRAPHY | Facility: CLINIC | Age: 49
End: 2024-03-06
Attending: FAMILY MEDICINE
Payer: MEDICARE

## 2024-03-06 DIAGNOSIS — Z12.31 VISIT FOR SCREENING MAMMOGRAM: ICD-10-CM

## 2024-03-06 PROCEDURE — 77063 BREAST TOMOSYNTHESIS BI: CPT | Mod: TC | Performed by: RADIOLOGY

## 2024-03-06 PROCEDURE — T1013 SIGN LANG/ORAL INTERPRETER: HCPCS | Mod: U3

## 2024-03-06 PROCEDURE — 77067 SCR MAMMO BI INCL CAD: CPT | Mod: TC | Performed by: RADIOLOGY

## 2024-03-06 RX ORDER — DICLOFENAC POTASSIUM 50 MG/1
50 TABLET, FILM COATED ORAL 3 TIMES DAILY
Qty: 40 TABLET | Refills: 1 | Status: SHIPPED | OUTPATIENT
Start: 2024-03-06 | End: 2024-03-27 | Stop reason: ALTCHOICE

## 2024-03-06 NOTE — TELEPHONE ENCOUNTER
Please let patient know we can try cataflam to see if that helps pain and see if it is covered.    Dr. Krystin Sen, DO    Obstetrics and Gynecology  [facility]

## 2024-03-25 ENCOUNTER — MYC MEDICAL ADVICE (OUTPATIENT)
Dept: FAMILY MEDICINE | Facility: CLINIC | Age: 49
End: 2024-03-25
Payer: MEDICARE

## 2024-03-27 ENCOUNTER — OFFICE VISIT (OUTPATIENT)
Dept: FAMILY MEDICINE | Facility: CLINIC | Age: 49
End: 2024-03-27
Payer: MEDICARE

## 2024-03-27 VITALS
DIASTOLIC BLOOD PRESSURE: 59 MMHG | SYSTOLIC BLOOD PRESSURE: 104 MMHG | TEMPERATURE: 98 F | BODY MASS INDEX: 24.05 KG/M2 | OXYGEN SATURATION: 99 % | HEIGHT: 64 IN | WEIGHT: 140.9 LBS | RESPIRATION RATE: 16 BRPM | HEART RATE: 61 BPM

## 2024-03-27 DIAGNOSIS — K59.00 CONSTIPATION, UNSPECIFIED CONSTIPATION TYPE: ICD-10-CM

## 2024-03-27 DIAGNOSIS — L30.9 DERMATITIS: ICD-10-CM

## 2024-03-27 DIAGNOSIS — K64.4 EXTERNAL HEMORRHOIDS: ICD-10-CM

## 2024-03-27 PROCEDURE — 99213 OFFICE O/P EST LOW 20 MIN: CPT | Performed by: FAMILY MEDICINE

## 2024-03-27 RX ORDER — TRIAMCINOLONE ACETONIDE 1 MG/G
CREAM TOPICAL
Qty: 80 G | Refills: 2 | Status: SHIPPED | OUTPATIENT
Start: 2024-03-27

## 2024-03-27 RX ORDER — HYDROCORTISONE 25 MG/G
CREAM TOPICAL 2 TIMES DAILY PRN
Qty: 60 G | Refills: 2 | Status: SHIPPED | OUTPATIENT
Start: 2024-03-27 | End: 2024-04-08

## 2024-03-27 RX ORDER — AMOXICILLIN 250 MG
1-2 CAPSULE ORAL 2 TIMES DAILY
Qty: 30 TABLET | Refills: 3 | Status: SHIPPED | OUTPATIENT
Start: 2024-03-27

## 2024-03-27 NOTE — PROGRESS NOTES
"  Assessment & Plan     External hemorrhoids  Refill for PRN use  - hydrocortisone, Perianal, (ANUSOL-HC) 2.5 % cream; Place rectally 2 times daily as needed for hemorrhoids APPLY RECTALLY TO THE AFFECTED AREA TWICE DAILY    Dermatitis  Refill for PRN use  - triamcinolone (KENALOG) 0.1 % external cream; APPLY SPARINGLY TOPICALLY TO THE AFFECTED AREA THREE TIMES DAILY FOR 14 DAYS    Constipation, unspecified constipation type  Refill for PRN use  - senna-docusate (SENOKOT-S/PERICOLACE) 8.6-50 MG tablet; Take 1-2 tablets by mouth 2 times daily      13 minutes spent by me on the date of the encounter doing chart review, history and exam, documentation and further activities per the note        See Patient Instructions    Jim Cochran is a 48 year old, presenting for the following health issues:  Recheck Medication      3/27/2024     2:16 PM   Additional Questions   Roomed by Cecelia   Accompanied by Sheryl YOUNG     History of Present Illness       Reason for visit:  Follow up medication review    She eats 4 or more servings of fruits and vegetables daily.She consumes 4 sweetened beverage(s) daily.She exercises with enough effort to increase her heart rate 60 or more minutes per day.  She exercises with enough effort to increase her heart rate 5 days per week.   She is taking medications regularly.         Medication Followup of Med list  Taking Medication as prescribed: yes  Side Effects:  None  Medication Helping Symptoms:  yes      No concerns.  Med check, needs some medications refilled.        Objective    /59 (BP Location: Right arm, Patient Position: Sitting, Cuff Size: Adult Regular)   Pulse 61   Temp 98  F (36.7  C) (Oral)   Resp 16   Ht 1.626 m (5' 4\")   Wt 63.9 kg (140 lb 14.4 oz)   LMP 05/12/2023   SpO2 99%   BMI 24.19 kg/m    Body mass index is 24.19 kg/m .  Physical Exam   GENERAL: alert and no distress  PSYCH: mentation appears normal, affect normal/bright          Signed " Electronically by: Jocy Gimenez MD

## 2024-04-08 DIAGNOSIS — K64.4 EXTERNAL HEMORRHOIDS: Primary | ICD-10-CM

## 2024-04-08 RX ORDER — HYDROCORTISONE 2.5 %
CREAM (GRAM) TOPICAL 2 TIMES DAILY PRN
Qty: 30 G | Refills: 3 | Status: SHIPPED | OUTPATIENT
Start: 2024-04-08

## 2024-04-15 ENCOUNTER — OFFICE VISIT (OUTPATIENT)
Dept: URGENT CARE | Facility: URGENT CARE | Age: 49
End: 2024-04-15
Payer: MEDICARE

## 2024-04-15 ENCOUNTER — NURSE TRIAGE (OUTPATIENT)
Dept: FAMILY MEDICINE | Facility: CLINIC | Age: 49
End: 2024-04-15
Payer: MEDICARE

## 2024-04-15 VITALS
WEIGHT: 140 LBS | RESPIRATION RATE: 16 BRPM | TEMPERATURE: 97.8 F | HEART RATE: 57 BPM | BODY MASS INDEX: 24.03 KG/M2 | SYSTOLIC BLOOD PRESSURE: 120 MMHG | OXYGEN SATURATION: 100 % | DIASTOLIC BLOOD PRESSURE: 70 MMHG

## 2024-04-15 DIAGNOSIS — R19.7 DIARRHEA OF PRESUMED INFECTIOUS ORIGIN: Primary | ICD-10-CM

## 2024-04-15 LAB
BASOPHILS # BLD AUTO: 0 10E3/UL (ref 0–0.2)
BASOPHILS NFR BLD AUTO: 0 %
EOSINOPHIL # BLD AUTO: 0 10E3/UL (ref 0–0.7)
EOSINOPHIL NFR BLD AUTO: 1 %
ERYTHROCYTE [DISTWIDTH] IN BLOOD BY AUTOMATED COUNT: 12.9 % (ref 10–15)
HCT VFR BLD AUTO: 39.4 % (ref 35–47)
HGB BLD-MCNC: 12.8 G/DL (ref 11.7–15.7)
IMM GRANULOCYTES # BLD: 0 10E3/UL
IMM GRANULOCYTES NFR BLD: 0 %
LYMPHOCYTES # BLD AUTO: 1.7 10E3/UL (ref 0.8–5.3)
LYMPHOCYTES NFR BLD AUTO: 30 %
MCH RBC QN AUTO: 28 PG (ref 26.5–33)
MCHC RBC AUTO-ENTMCNC: 32.5 G/DL (ref 31.5–36.5)
MCV RBC AUTO: 86 FL (ref 78–100)
MONOCYTES # BLD AUTO: 0.4 10E3/UL (ref 0–1.3)
MONOCYTES NFR BLD AUTO: 7 %
NEUTROPHILS # BLD AUTO: 3.5 10E3/UL (ref 1.6–8.3)
NEUTROPHILS NFR BLD AUTO: 62 %
PLATELET # BLD AUTO: 270 10E3/UL (ref 150–450)
RBC # BLD AUTO: 4.57 10E6/UL (ref 3.8–5.2)
WBC # BLD AUTO: 5.6 10E3/UL (ref 4–11)

## 2024-04-15 PROCEDURE — 36415 COLL VENOUS BLD VENIPUNCTURE: CPT

## 2024-04-15 PROCEDURE — 85025 COMPLETE CBC W/AUTO DIFF WBC: CPT

## 2024-04-15 PROCEDURE — 80048 BASIC METABOLIC PNL TOTAL CA: CPT

## 2024-04-15 PROCEDURE — 99214 OFFICE O/P EST MOD 30 MIN: CPT

## 2024-04-15 NOTE — TELEPHONE ENCOUNTER
Pt calls.  Advised of below.  She would like to go to UC today, but if the wait is too long, she will wait to be seen tomorrow.  So she did not want the appt cancelled yet.

## 2024-04-15 NOTE — PROGRESS NOTES
Assessment & Plan     Diarrhea of presumed infectious origin  Patient presents to  for a complaint of diarrhea ongoing for 8 days. Patient states she has not be on recent antibiotics, no exposures to illness or recent travel. She had a fever at the start of the illness but since has been afebrile.  Patient also states she has abdominal cramping in the epigastric areas as well as the left lower abdomen but patient states that is due to her endometrosis. She will follow-up with OBGYN for that. Clinical picture suggests gastroenteritis. Will get labs per patient request to ensure her electrolytes are okay. Patient all worried she has an infection and would like to get labs done to ensure her white blood cell count is normal. Discussed with patient that since her vitals are within normal limits, labs would not be warranted at this time. However patient would feel more comfortable to get the labs. Will order a stool culture to rule out infectious etiology that would warrant antibiotics. Patient agrees with the plan.    - Enteric Bacteria and Virus Panel by [facility] Stool  - CBC with platelets and differential  - Basic metabolic panel  (Ca, Cl, CO2, Creat, Gluc, K, Na, BUN)  - Enteric Bacteria and Virus Panel by [facility] Stool  - CBC with platelets and differential  - Basic metabolic panel  (Ca, Cl, CO2, Creat, Gluc, K, Na, BUN)    Discussed lab results with patient- no infection present in blood. Patient will bring stool back to lab ASAP.     Advised imodium as needed for ongoing diarrhea as well as Aquaphor, Vaseline or A&D rectally to help with any discomfort     Patient has an appointment scheduled with PCP tomorrow. Patient asked if she needed to keep appointment. I advised her that if her symptoms improve and she does not experience any more diarrhea, she does not need to keep that appointment. Patient was advised she can return the stool culture to any [facility] lab at her earliest convenience.       35 minutes spent by me  on the date of the encounter doing chart review, review of test results, interpretation of tests, patient visit, and documentation         No follow-ups on file.    FRANK  PROVIDER  [facility] FRANK Cochran is a 48 year old female who presents to clinic today for the following health issues:  Chief Complaint   Patient presents with    Diarrhea     Diarrhea since the 8th-Pt is now scared to eat-abdominal pain as well off and on      HPI    The patient is here for a complaint of diarrhea from Last Monday until yesterday evening. She has not had anything since yesterday. After every time she had anything to eat she has had diarrhea.   No recent antibiotics.   Exposures to illness: none that she knows.   Her anus is very tender and on fire.   She has not used imodium, she has tried pepto which has helped.   Water intake: drinks plenty of water per patient.   She is afraid to eat again.       Gastro    Onset of symptoms was 8 day(s) ago.  Course of illness is improving.    Severity moderate  Current and Associated symptoms:  abdominal pain epigastric, cramping, and diarrhea  Aggravating factors: any type of food.    Alleviating factors:not eating and pepto  Diarrhea: Yes  ranged from 1 to 4-5 a day   Stools: watery and runny  Vomitting: No  Appetite: normal  Risk factors: none      Review of Systems  Constitutional, HEENT, cardiovascular, pulmonary, gi and gu systems are negative, except as otherwise noted.      Objective    /70   Pulse 57   Temp 97.8  F (36.6  C) (Tympanic)   Resp 16   Wt 63.5 kg (140 lb)   LMP 05/12/2023   SpO2 100%   BMI 24.03 kg/m    Physical Exam   GENERAL: alert and no distress  NECK: no adenopathy, no asymmetry, masses, or scars  RESP: lungs clear to auscultation - no rales, rhonchi or wheezes  CV: regular rate and rhythm, normal S1 S2, no S3 or S4, no murmur, click or rub, no peripheral edema  ABDOMEN: soft, nontender, no hepatosplenomegaly, no  masses and bowel sounds normal  MS: no gross musculoskeletal defects noted, no edema

## 2024-04-15 NOTE — PATIENT INSTRUCTIONS
Follow BRAT diet:   Banana, rice, apples, toast    For anal discomfort- Apply Vaseline or A&D or Aquaphor

## 2024-04-15 NOTE — TELEPHONE ENCOUNTER
With assistance of  through [facility]  , left message to call back any triage nurse.     Bertha Draper RN

## 2024-04-15 NOTE — TELEPHONE ENCOUNTER
Nurse Triage SBAR    Is this a 2nd Level Triage? YES, LICENSED PRACTITIONER REVIEW IS REQUIRED    Situation: diarrhea x 1 week     Background: diarrhea x 1 week since returning from a cruise     Assessment: Patient calls to report diarrhea that has been going on since 4/8/24. Patient returned on 4/6/24 from a cruise out of [place] to [place] and the [facility]. She reports intermittent, medial abdominal pain with diarrhea. She denies any vomiting or blood in stool. Denies pain currently, it is intermittent. No recent use of abx. She is able to drink fluids and eat, but it goes right through her. She has lost 8 pounds in the past week. Reports fever last week, resolved now. No loose stools yet today.     Protocol Recommended Disposition:   See in Office Today    Recommendation: Routing to PCP patient was offered appointment at [place] today but cannot make it. (No available appointments at , , [place] or  today). Scheduled an appointment in  tomorrow.  Patient would like to wait to be seen tomorrow, agrees to go to urgent care today if diarrhea worsens today. Okay with plan?    Routed to provider    Billie Mendoza RN on 4/15/2024 at 10:13 AM    **triage when you return call, patient's phone number will automatically connect to , can leave a detailed message if patient does not answer per pt.       Does the patient meet one of the following criteria for ADS visit consideration? 16+ years old, with an [facility] PCP     TIP  Providers, please consider if this condition is appropriate for management at one of our Acute and Diagnostic Services sites.     If patient is a good candidate, please use dotphrase <dot>triageresponse and select Refer to ADS to document.    Reason for Disposition   MODERATE diarrhea (e.g., 4-6 times / day more than normal) and present > 48 hours (2 days)    Additional Information   Negative: Shock suspected (e.g., cold/pale/clammy skin, too weak to stand, low BP, rapid pulse)    "Negative: Difficult to awaken or acting confused (e.g., disoriented, slurred speech)   Negative: Sounds like a life-threatening emergency to the triager   Negative: Vomiting also present and worse than the diarrhea   Negative: Blood in stool and without diarrhea   Negative: SEVERE abdominal pain (e.g., excruciating) and present > 1 hour   Negative: SEVERE abdominal pain and age > 60 years   Negative: Bloody, black, or tarry bowel movements  (Exception: Chronic-unchanged black-grey bowel movements and is taking iron pills or Pepto-Bismol.)   Negative: SEVERE diarrhea (e.g., 7 or more times / day more than normal) and age > 60 years   Negative: Constant abdominal pain lasting > 2 hours   Negative: Drinking very little and dehydration suspected (e.g., no urine > 12 hours, very dry mouth, very lightheaded)   Negative: Patient sounds very sick or weak to the triager   Negative: SEVERE diarrhea (e.g., 7 or more times / day more than normal) and present > 24 hours (1 day)    Answer Assessment - Initial Assessment Questions  1. DIARRHEA SEVERITY:  4 episodes of diarrhea in past 24 hours  2. ONSET: 4/8/24  3. BM CONSISTENCY: Watery  4. VOMITING: Denies  5. ABDOMEN PAIN: Yes medial, above belly button, intermittent   6. ABDOMEN PAIN SEVERITY:  7/10  7. ORAL INTAKE:  N/a, no vomiting  8. HYDRATION: Weight loss of 8 pounds in the past week. Last void: this morning.  9. EXPOSURE:  Yes, cruise out of [place] to [facility] and [facility]. Returned on 4/6/24.   10. ANTIBIOTIC USE: \"Are you taking antibiotics now or have you taken antibiotics in the past 2 months?\"        Denies  11. OTHER SYMPTOMS:  Fever last week, resolved now. Denies blood in stool.   12. PREGNANCY:   Denies, IUD    Protocols used: Diarrhea-A-OH    "

## 2024-04-16 ENCOUNTER — TELEPHONE (OUTPATIENT)
Dept: FAMILY MEDICINE | Facility: CLINIC | Age: 49
End: 2024-04-16

## 2024-04-16 ENCOUNTER — MYC MEDICAL ADVICE (OUTPATIENT)
Dept: FAMILY MEDICINE | Facility: CLINIC | Age: 49
End: 2024-04-16

## 2024-04-16 DIAGNOSIS — Z12.11 COLON CANCER SCREENING: Primary | ICD-10-CM

## 2024-04-16 PROCEDURE — 87507 IADNA-DNA/RNA PROBE TQ 12-25: CPT | Mod: GZ

## 2024-04-16 NOTE — TELEPHONE ENCOUNTER
See mychart message from Jocy Gimenez MD 3/27/24.    Coming Jocy Gimenez MD   to Sammie MOREL James JENNINGS      3/27/24  7:35 PM  Hello,     I was reviewing charts and see that you are due for colon cancer screening.     I recommend that everyone get a colonoscopy starting at age 45, which is the gold standard for colon cancer screening. This is the most reliable way to evaluate for colon cancer.  If you are agreeable to getting your colonoscopy done, please let us know and an order can be placed. Someone will call you to schedule your colonoscopy at your convenience.  Colonoscopies are recommended every 10 years for most people, though some people may be recommended to return in 7 years, 5 years, 3 years, or 1 year depending on the findings.     If you are unwilling or unable to do a colonoscopy, there are alternative options for screening that are better than no screening at all.  Anyone can do a stool test called the FIT test or FOBT Test, which is a test done on a stool sample that checks for the presence of blood.  This test kit can be picked up at the clinic.  If your stool test is negative, your colon cancer screening is good for one year.  If your test is positive, you will be recommended to get a colonoscopy to evaluate for cancer.  If you are interested in doing this colon cancer screening, please let us know and orders can be placed for you to come and  the kit.     Another option is to do a ColoGuard Test, which is a different stool test that looks for the presence of abnormal DNA.  This is more specific to colon cancer than the FIT test, which looks for the presence of blood.  This test is good for three years if you have a negative test.  If your test is positive, you will need to do a colonoscopy to evaluate for cancer.  This test is not appropriate for anyone who has a personal history of colon polyps or a family history of colon cancer.  If you have a history of polyps or family history  of colon cancer, you should have a colonoscopy.  If you would like to do this test for screening, please let us know and orders can be placed for you.  This test kit will be mailed to your home.     If you have any questions or concerns, please reach out!     April Coming MONIQUE Gimenez.       Last read by Sammie Ramirez at  9:41 AM on 4/15/2024.

## 2024-04-16 NOTE — TELEPHONE ENCOUNTER
Pt calling with [facility] interp. Pt went to UC yesterday for diarrhea and was advised to drop off stool sample. Pt states she is no longer having diarrhea and has not have BM since yesterday. States she is feeling better. Has no fever. States she does not feel that she needs an appt today.   States she will drop off stool sample when she is able to.   Appt for today cancelled per pt's request.     Karine Cabrera RN      Appointments in Next Year      Oct 07, 2024 10:00 AM  (Arrive by 9:40 AM)  Preventative Adult Visit with Jocy Gimenez MD  [facility] ([facility] ) [phone]

## 2024-04-17 LAB
ANION GAP SERPL CALCULATED.3IONS-SCNC: 15 MMOL/L (ref 7–15)
BUN SERPL-MCNC: 5.5 MG/DL (ref 6–20)
CALCIUM SERPL-MCNC: 9.5 MG/DL (ref 8.6–10)
CHLORIDE SERPL-SCNC: 102 MMOL/L (ref 98–107)
CREAT SERPL-MCNC: 0.78 MG/DL (ref 0.51–0.95)
DEPRECATED HCO3 PLAS-SCNC: 22 MMOL/L (ref 22–29)
EGFRCR SERPLBLD CKD-EPI 2021: >90 ML/MIN/1.73M2
GLUCOSE SERPL-MCNC: 92 MG/DL (ref 70–99)
POTASSIUM SERPL-SCNC: 4.1 MMOL/L (ref 3.4–5.3)
SODIUM SERPL-SCNC: 139 MMOL/L (ref 135–145)

## 2024-04-18 ENCOUNTER — TELEPHONE (OUTPATIENT)
Dept: FAMILY MEDICINE | Facility: CLINIC | Age: 49
End: 2024-04-18
Payer: MEDICARE

## 2024-04-18 NOTE — TELEPHONE ENCOUNTER
Patient calling and states she was told to call her primary regarding + E Coli test.  Discussed below and that treatment is not needed and to continue fluids and BRAT diet.  Went over what BRAT diet is.  Advised if ongoing concerns she should schedule a visit for recheck.      Wondering about cologaurd.  Discussed below messages.  Has not called her insurance.  States she will do that tomorrow.  Discussed multiple times if cologuard positive will need colonscopy and then diagnostic and usually not covered by insurance.  Advised to check cologuard coverage as well as if + if colonoscopy covered or not.  Patient voiced understanding.  Will call back or send Parkya message after speaking to her insurance.      Having concerns with IUD- bleeding and pain.  Advised contacting Ob/Gyn Krystin Sen MD office with those concerns.  Patient agrees with plan.  Cierra Gao RN    Please notify -     Stool culture positive for enterotoxigenic E.coli.  This is a self limiting bacterial gastroenteritis and does not require antibiotic.  Please continue with supportive cares with fluids, BRAT diet.     Follow up with primary provider if any further concerns.    Honey Ling RN   to Sammie Ramirez         4/16/24  3:49 PM  Becca Cochran,     What questions do you have? We may be able to answer through [employer].  I see that MD Jessica Zaragoza messaged you regarding colon cancer screening on 3/27/24. She did list the ColoGuard test as a possible screening method. We always encourage patients to also reach out to their insurance as well to see what will be covered through their benefits.     Thanks,  Honey Ling RN    Last read by Sammie Ramirez at  7:42 PM on 4/17/2024.  Honey Ling RN         4/16/24  3:43 PM  Note  See [email or web address] message from Jocy Gimenez MD 3/27/24.     Coming Jocy Gimenez MD   to Sammie Ramirez         3/27/24  7:35 PM  Hello,     I was reviewing charts and see that you are due for  colon cancer screening.     I recommend that everyone get a colonoscopy starting at age 45, which is the gold standard for colon cancer screening. This is the most reliable way to evaluate for colon cancer.  If you are agreeable to getting your colonoscopy done, please let us know and an order can be placed. Someone will call you to schedule your colonoscopy at your convenience.  Colonoscopies are recommended every 10 years for most people, though some people may be recommended to return in 7 years, 5 years, 3 years, or 1 year depending on the findings.     If you are unwilling or unable to do a colonoscopy, there are alternative options for screening that are better than no screening at all.  Anyone can do a stool test called the FIT test or FOBT Test, which is a test done on a stool sample that checks for the presence of blood.  This test kit can be picked up at the clinic.  If your stool test is negative, your colon cancer screening is good for one year.  If your test is positive, you will be recommended to get a colonoscopy to evaluate for cancer.  If you are interested in doing this colon cancer screening, please let us know and orders can be placed for you to come and  the kit.     Another option is to do a ColoGuard Test, which is a different stool test that looks for the presence of abnormal DNA.  This is more specific to colon cancer than the FIT test, which looks for the presence of blood.  This test is good for three years if you have a negative test.  If your test is positive, you will need to do a colonoscopy to evaluate for cancer.  This test is not appropriate for anyone who has a personal history of colon polyps or a family history of colon cancer.  If you have a history of polyps or family history of colon cancer, you should have a colonoscopy.  If you would like to do this test for screening, please let us know and orders can be placed for you.  This test kit will be mailed to your home.      If you have any questions or concerns, please reach out!     Jocy Gimenez M.D.       Last read by Sammie Ramirez at  9:41 AM on 4/15/2024.             RB    4/16/24 10:29 AM  Behrens, Ruth routed this conversation to Cr Triage  Sammie Ramirez   to P Cr Support [facility] Team (Zac) (supporting April Nathalie Gimenez MD)         4/16/24 10:17 AM  I would like to get Cologuard test instead of colonoscopy that I have appt on June 3rd. I need to get some information about Cologuard test if that possible. Do I need to make an appt with provider office visit?

## 2024-04-22 ENCOUNTER — ORDERS ONLY (AUTO-RELEASED) (OUTPATIENT)
Dept: FAMILY MEDICINE | Facility: CLINIC | Age: 49
End: 2024-04-22
Payer: MEDICARE

## 2024-04-22 DIAGNOSIS — Z12.11 COLON CANCER SCREENING: ICD-10-CM

## 2024-04-22 NOTE — TELEPHONE ENCOUNTER
See [employer] Message. Please review and advise on plan. Pt wants cologuard instead of colonoscopy.    Honey Ling RN on 4/22/2024 at 3:09 PM

## 2024-04-24 LAB

## 2024-05-06 ENCOUNTER — TRANSFERRED RECORDS (OUTPATIENT)
Dept: HEALTH INFORMATION MANAGEMENT | Facility: CLINIC | Age: 49
End: 2024-05-06
Payer: MEDICARE

## 2024-05-06 RX ORDER — BISACODYL 5 MG/1
TABLET, DELAYED RELEASE ORAL
Qty: 4 TABLET | Refills: 0 | Status: SHIPPED | OUTPATIENT
Start: 2024-05-06

## 2024-05-06 NOTE — TELEPHONE ENCOUNTER
Extended Golytely Bowel Prep . Instructions were sent via [facility]. Due to Senna on med list for constipation. Bowel prep was sent 5/6/2024 to [id number] - [place], [place] - [address]

## 2024-05-06 NOTE — TELEPHONE ENCOUNTER
Sammie Ramirez  to Mercedes Leal, ZEINA       5/6/24  9:57 AM  Becca Long, I would like to cancelled this appt due I already had cologuard test sent in the mail last week Friday. I would like this appt is canceled. Thank you, Sammie Ramirez

## 2024-05-06 NOTE — TELEPHONE ENCOUNTER
Caller: Jessica communication    Reason for Reschedule/Cancellation   (please be detailed, any staff messages or encounters to note?): Completed Cologuard instead.       Prior to reschedule please review:  Ordering Provider: Krystin Sen, DO   Sedation Determined: Moderate  Does patient have any ASC Exclusions, please identify?: No      Notes on Cancelled Procedure:  Procedure: Lower Endoscopy [Colonoscopy]   Date: 6/3/2024  Location: [facility]; [address] [place] [address]  Surgeon: Randal      Rescheduled: No, not needed at this time.        Did you cancel or rescheduled an EUS procedure? No.

## 2024-05-08 ENCOUNTER — TRANSFERRED RECORDS (OUTPATIENT)
Dept: HEALTH INFORMATION MANAGEMENT | Facility: CLINIC | Age: 49
End: 2024-05-08
Payer: MEDICARE

## 2024-05-11 LAB — NONINV COLON CA DNA+OCC BLD SCRN STL QL: NEGATIVE

## 2024-05-20 ENCOUNTER — OFFICE VISIT (OUTPATIENT)
Dept: OBGYN | Facility: CLINIC | Age: 49
End: 2024-05-20
Payer: MEDICARE

## 2024-05-20 VITALS — SYSTOLIC BLOOD PRESSURE: 122 MMHG | DIASTOLIC BLOOD PRESSURE: 70 MMHG | WEIGHT: 140.2 LBS | BODY MASS INDEX: 24.07 KG/M2

## 2024-05-20 DIAGNOSIS — Z30.432 ENCOUNTER FOR IUD REMOVAL: Primary | ICD-10-CM

## 2024-05-20 DIAGNOSIS — Z30.013 ENCOUNTER FOR INITIAL PRESCRIPTION OF INJECTABLE CONTRACEPTIVE: ICD-10-CM

## 2024-05-20 DIAGNOSIS — N80.9 ENDOMETRIOSIS: ICD-10-CM

## 2024-05-20 PROCEDURE — T1013 SIGN LANG/ORAL INTERPRETER: HCPCS | Mod: U3 | Performed by: FAMILY MEDICINE

## 2024-05-20 PROCEDURE — 58301 REMOVE INTRAUTERINE DEVICE: CPT | Performed by: FAMILY MEDICINE

## 2024-05-20 PROCEDURE — 96372 THER/PROPH/DIAG INJ SC/IM: CPT | Mod: 59 | Performed by: FAMILY MEDICINE

## 2024-05-20 PROCEDURE — 99213 OFFICE O/P EST LOW 20 MIN: CPT | Mod: 25 | Performed by: FAMILY MEDICINE

## 2024-05-20 RX ORDER — MEDROXYPROGESTERONE ACETATE 150 MG/ML
150 INJECTION, SUSPENSION INTRAMUSCULAR
Status: SHIPPED | OUTPATIENT
Start: 2024-05-20 | End: 2025-05-15

## 2024-05-20 RX ADMIN — MEDROXYPROGESTERONE ACETATE 150 MG: 150 INJECTION, SUSPENSION INTRAMUSCULAR at 14:06

## 2024-05-20 NOTE — PROGRESS NOTES
SUBJECTIVE:  Sammie Ramirez is an 48 year old   woman who presents for   gynecology consult for IUD possibly causing headaches, was in place for endometriosis/pelvic pain.    She is considering depo provera vs depo-lupron to help instead with endometriosis.   She worries about depo-provera affecting her sex drive.  Depo-lupron would help with pelvic pain, with   Possible side effects of menopause, weight gain, anxiety.     Patient's last menstrual period was 2024.       Past Medical History:   Diagnosis Date    ASCUS (atypical squamous cells of undetermined significance) on Pap smear 10/13/2016    Neg HPV    History of colposcopy 2018    LSIL (low grade squamous intraepithelial lesion) on Pap smear 2014          Family History   Problem Relation Age of Onset    Breast Cancer Mother     Family History Negative Mother     Family History Negative Father     Prostate Cancer Father     Liver Cancer Father     Breast Cancer Maternal Grandmother     Myocardial Infarction Paternal Grandmother     Myocardial Infarction Paternal Grandfather        Past Surgical History:   Procedure Laterality Date    COLPOSCOPY, BIOPSY, COMBINED N/A 2024    Procedure: COLPOSCOPY;  Surgeon: Krystin Sen DO;  Location: [facility] OR    DAVINCI PELVIC PROCEDURE Right 2022    Procedure: Xi Robotic  endometriosis resection/fulguration, cervical biopsy, and endometrial biopsy;  Surgeon: Krystin Sen DO;  Location: [facility] OR    DILATION AND CURETTAGE, OPERATIVE HYSTEROSCOPY WITH MORCELLATOR, COMBINED N/A 2024    Procedure: Hysteroscopy, dilation and curettage, polypectomy with myosure, mirena intrauterine device insertion;  Surgeon: Krystin Sen DO;  Location: [facility] OR    INSERT INTRAUTERINE DEVICE N/A 2024    Procedure: Insert intrauterine device;  Surgeon: Krystin Sen DO;  Location: [facility] OR    LEEP TX, CERVICAL  x 2 per chart    historical    ORTHOPEDIC SURGERY          Current Outpatient Medications   Medication Sig Dispense Refill    acetaminophen (TYLENOL) 325 MG tablet Take 3 tablets (975 mg) by mouth every 6 hours as needed for mild pain 50 tablet 0    bisacodyl (DULCOLAX) 5 MG EC tablet Two days prior to exam take two (2) tablets at 4pm. One day prior to exam take two (2) tablets at 4pm 4 tablet 0    hydrocortisone 2.5 % cream Apply topically 2 times daily as needed for itching or other (hemorrhoids) 30 g 3    hydrOXYzine (VISTARIL) 50 MG capsule Take 1 tablet every 6 hours as needed for itching 12 capsule 0    hydrOXYzine HCl (ATARAX) 25 MG tablet Take 1 tablet (25 mg) by mouth 3 times daily as needed for itching 30 tablet 0    ibuprofen (ADVIL/MOTRIN) 800 MG tablet Take 1 tablet (800 mg) by mouth every 6 hours as needed for other (mild and/or inflammatory pain) 30 tablet 0    ketorolac (TORADOL) 10 MG tablet Take 1 tablet (10 mg) by mouth every 6 hours as needed for moderate pain 20 tablet 1    levocetirizine (XYZAL) 5 MG tablet Take 1 tablet (5 mg) by mouth every evening 30 tablet 1    mometasone (ELOCON) 0.1 % external ointment Apply topically daily For up to 14 days 45 g 0    mometasone (NASONEX) 50 MCG/ACT nasal spray Spray 2 sprays into both nostrils daily 17 g 1    mupirocin (BACTROBAN) 2 % external ointment APPLY TOPICALLY TO THE AFFECTED AREA THREE TIMES DAILY 30 g 0    omeprazole (PRILOSEC) 40 MG DR capsule TAKE 1 CAPSULE(40 MG) BY MOUTH DAILY 90 capsule 3    ondansetron (ZOFRAN) 8 MG tablet TAKE 1 TABLET(8 MG) BY MOUTH EVERY 6-8 HOURS AS NEEDED FOR NAUSEA 20 tablet 1    polyethylene glycol (GOLYTELY) 236 g suspension Take as directed in colonoscopy prep instructions 8000 mL 0    polyethylene glycol (MIRALAX) 17 GM/Dose powder Take 17 g (1 capful) by mouth daily (Patient taking differently: Take 1 capful. by mouth daily as needed) 500 g 0    rizatriptan (MAXALT) 10 MG tablet Take 1 tablet (10 mg) by mouth at onset of headache for migraine May repeat in 2  hours. Max 3 tablets/24 hours. 18 tablet 1    senna-docusate (SENOKOT-S/PERICOLACE) 8.6-50 MG tablet Take 1-2 tablets by mouth 2 times daily 30 tablet 3    SUMAtriptan (IMITREX) 100 MG tablet Take 1 tablet (100 mg) by mouth at onset of headache for migraine that occur at night.  Take rizatriptan for migraines during the day. 9 tablet 3    triamcinolone (KENALOG) 0.1 % external cream APPLY SPARINGLY TOPICALLY TO THE AFFECTED AREA THREE TIMES DAILY FOR 14 DAYS 80 g 2     No current facility-administered medications for this visit.     Allergies   Allergen Reactions    Amoxicillin      Rash, hives    Co-Trimoxazole [Sulfamethoxazole-Trimethoprim] Other (See Comments)     Yeast infection when taking this medication    Codeine Sulfate Nausea and Vomiting    Hydrocodone Nausea and Vomiting    Hydromorphone Itching     Light reaction, not severe    Lactose Diarrhea     dairy    Oxycodone Hives and Itching    Scopolamine Headache     Severe headache    Seasonal Allergies     Tramadol Headache and Nausea     Unsure if reaction was due to medication or COVID vaccine       Social History     Tobacco Use    Smoking status: Never    Smokeless tobacco: Never   Substance Use Topics    Alcohol use: Yes     Comment: wine 1-2 glasses on weekends       Review Of Systems  Ears/Nose/Throat: negative  Respiratory: No shortness of breath, dyspnea on exertion, cough, or hemoptysis  Cardiovascular: negative  Gastrointestinal: negative  Genitourinary: See HPI   Constitutional, HEENT, cardiovascular, pulmonary, GI, , musculoskeletal, neuro, skin, endocrine and psych systems are negative, except as otherwise noted.    OBJECTIVE:  /70   Wt 63.6 kg (140 lb 3.2 oz)   LMP 05/03/2024   BMI 24.07 kg/m    General appearance: healthy, alert, and no distress  Skin: Skin color, texture, turgor normal. No rashes or lesions.  Ears: negative  Nose/Sinuses: Nares normal. Septum midline. Mucosa normal. No drainage or sinus tenderness.  Oropharynx:  Lips, mucosa, and tongue normal. Teeth and gums normal.  Neck: Neck supple. No adenopathy. Thyroid symmetric, normal size,, Carotids without bruits.  Lungs: negative, Percussion normal. Good diaphragmatic excursion. Lungs clear  Heart: negative, PMI normal. No lifts, heaves, or thrills. RRR. No murmurs, clicks gallops or rub  Pelvic: Pelvic:  Pelvic examination with no pap/no Gonorrhea and Chlamydia   including  External genitalia normal   and vagina normal rugatted not atrophic  Examination of urethra  normal no masses, tenderness, scarring  bladder, no masses or tenderness  Cervix no lesions or discharge  IUD strings noted     Procedure:  Strings grasped with ring forceps and IUD easily removed, intact.   Patient tolerated well, no complications.      ASSESSMENT:  Sammie Ramirez is an 48 year old   woman who presents for   gynecology consult for IUD possibly causing headaches, was in place for endometriosis/pelvic pain.    She is considering depo provera vs depo-lupron to help instead with endometriosis.   She worries about depo-provera affecting her sex drive.  Depo-lupron would help with pelvic pain, with   Possible side effects of menopause, weight gain, anxiety.     PLAN:  Dx:  1)  Endometriosis:  with IUD in place.  With side effects of headaches   She had side effects with combined oral contraceptive.  She worries depo-lupron will   Shut down her sex drive.      After discussion desires IUD removal as above   And start depo-provera which was done      She would also like to consider endometriosis excision, will evaluate in 3 months post depo-shot    Labs were reviewed in [facility]   Imaging was reviewed in Epic   Tests and documents were reviewed.   Discussion of management or test interpretation       Dr. Krystin Sen, DO    Obstetrics and Gynecology  [facility]

## 2024-05-20 NOTE — PATIENT INSTRUCTIONS
Return in 3 months     Dr. Krystin Sen, DO    Obstetrics and Gynecology  [facility]

## 2024-05-20 NOTE — PROGRESS NOTES

## 2024-05-20 NOTE — NURSING NOTE
"Chief Complaint   Patient presents with    Follow Up     For Mirena IUD. Patient had Mirena inserted 24, patient has noticed since IUD was inserted- she has had daily migraines, pain with endometriosis, low back pain. Would like to discuss having IUD removed and starting Depo? Just has general questions       Initial /70   Wt 63.6 kg (140 lb 3.2 oz)   LMP 2024   BMI 24.07 kg/m   Estimated body mass index is 24.07 kg/m  as calculated from the following:    Height as of 3/27/24: 1.626 m (5' 4\").    Weight as of this encounter: 63.6 kg (140 lb 3.2 oz).  BP completed using cuff size: regular    Questioned patient about current smoking habits.  Pt. has never smoked.          The following HM Due: NONE    Jose Daniel Gee CMA             "

## 2024-05-21 ENCOUNTER — TELEPHONE (OUTPATIENT)
Dept: OBGYN | Facility: CLINIC | Age: 49
End: 2024-05-21
Payer: MEDICARE

## 2024-05-21 NOTE — TELEPHONE ENCOUNTER
Spoke with pt vis phone with .     Pt received Depo previa injection yesterday.     Had IUD removed yesterday.    Having some soreness and a tenderness at area. Feeling a little nauseous.     Advised to take Tylenol an rest as needed. Will call back if symptoms worsen.    Follow up visit scheduled 8/21/24.    Julia BEARD RN

## 2024-05-21 NOTE — CONFIDENTIAL NOTE
[facility]    Phone Message    May a detailed message be left on voicemail: yes     Reason for Call: Pt has some follow up questions to Yesterdays appointment that she had an vaccinations she had. Please call pt to discuss. Thank you    Action Taken: Message routed to:  Other: JULY SMITH    Travel Screening: Not Applicable

## 2024-05-24 DIAGNOSIS — K64.4 EXTERNAL HEMORRHOIDS: ICD-10-CM

## 2024-05-24 RX ORDER — HYDROCORTISONE 25 MG/G
CREAM TOPICAL 2 TIMES DAILY PRN
Qty: 30 G | Refills: 3 | Status: SHIPPED | OUTPATIENT
Start: 2024-05-24

## 2024-05-24 RX ORDER — HYDROCORTISONE 2.5 %
CREAM (GRAM) TOPICAL 2 TIMES DAILY PRN
Qty: 30 G | Refills: 3 | Status: CANCELLED | OUTPATIENT
Start: 2024-05-24

## 2024-05-24 NOTE — TELEPHONE ENCOUNTER
Fax from [facility].  DRUG NOT COVERED BY PATIENT PLAN. PLEASE CALL/FAX THE PHARMACY WITH ALTERNATIVE CHANGE MEDICATION WITH STRENGH,QUANTITY, AND REFILLS.

## 2024-06-19 DIAGNOSIS — R10.2 PELVIC PAIN IN FEMALE: ICD-10-CM

## 2024-06-19 RX ORDER — KETOROLAC TROMETHAMINE 10 MG/1
TABLET, FILM COATED ORAL
Qty: 20 TABLET | Refills: 1 | Status: SHIPPED | OUTPATIENT
Start: 2024-06-19

## 2024-06-21 ENCOUNTER — TELEPHONE (OUTPATIENT)
Dept: FAMILY MEDICINE | Facility: CLINIC | Age: 49
End: 2024-06-21
Payer: MEDICARE

## 2024-06-21 DIAGNOSIS — K64.4 EXTERNAL HEMORRHOIDS: ICD-10-CM

## 2024-06-21 NOTE — TELEPHONE ENCOUNTER
Prior Authorization Retail Medication Request    Medication/Dose:   hydrocortisone, Perianal, (HYDROCORTISONE) 2.5 % cream     Diagnosis and ICD code (if different than what is on RX):    New/renewal/insurance change PA/secondary ins. PA:  Previously Tried and Failed:    Rationale:      DRUG NOT COVERED BY PLAN.     Rx Number: [id number]    Nara Still TC     no fever and no chills.

## 2024-06-26 NOTE — TELEPHONE ENCOUNTER
PA Initiation    Medication: HYDROCORTISONE (PERIANAL) 2.5 % EX CREA  Insurance Company: [employer] Part D - Phone [phone] Fax [phone]  Pharmacy Filling the Rx: [facility] #[id number] [place], [place] - [address]  Filling Pharmacy Phone:    Filling Pharmacy Fax:    Start Date: 6/26/2024      \  Key: [id number]

## 2024-07-01 NOTE — TELEPHONE ENCOUNTER
Prior Authorization Approval    Medication: HYDROCORTISONE (PERIANAL) 2.5 % EX CREA  Authorization Effective Date: 1/1/2024  Authorization Expiration Date: 12/31/2024  Approved Dose/Quantity: 30g for 14 days  Reference #: Key: [id number]   Insurance Company: [employer] Part D - Phone [phone] Fax [phone]  Expected CoPay: $    CoPay Card Available: No    Financial Assistance Needed: no  [facility] prescription[id number] - [place], [place] - [address]  Pharmacy Notified: yes  Patient Notified:

## 2024-07-01 NOTE — TELEPHONE ENCOUNTER
Attempt to notify the prior authorization for hydrocortisone perianal has been approved.     With assist of , Left message asking patient to return the call.    Bertha Draper RN

## 2024-07-02 NOTE — TELEPHONE ENCOUNTER
Outgoing call to patient. Attempt # 2. Left message with  to call back any triage nurse.    Honey Ling RN on 7/2/2024 at 8:31 AM

## 2024-08-07 ENCOUNTER — TELEPHONE (OUTPATIENT)
Dept: OBGYN | Facility: CLINIC | Age: 49
End: 2024-08-07
Payer: MEDICARE

## 2024-08-07 DIAGNOSIS — B37.31 YEAST INFECTION OF THE VAGINA: Primary | ICD-10-CM

## 2024-08-07 RX ORDER — FLUCONAZOLE 150 MG/1
150 TABLET ORAL ONCE
Qty: 1 TABLET | Refills: 0 | Status: SHIPPED | OUTPATIENT
Start: 2024-08-07 | End: 2024-08-07

## 2024-08-07 NOTE — TELEPHONE ENCOUNTER
Pt calls with sx of yeast inf. Offered appt but pt asks for rx for diflucan.    She has vaginal itching, no other sx. Has hx of these sx with yeast inf.     Advised will ask provider    Marysol ORDAZ RN BSN  [facility]

## 2024-08-21 ENCOUNTER — OFFICE VISIT (OUTPATIENT)
Dept: OBGYN | Facility: CLINIC | Age: 49
End: 2024-08-21
Payer: MEDICARE

## 2024-08-21 VITALS — BODY MASS INDEX: 24.03 KG/M2 | SYSTOLIC BLOOD PRESSURE: 138 MMHG | DIASTOLIC BLOOD PRESSURE: 76 MMHG | WEIGHT: 140 LBS

## 2024-08-21 DIAGNOSIS — N80.9 ENDOMETRIOSIS: ICD-10-CM

## 2024-08-21 DIAGNOSIS — R10.2 PELVIC PAIN IN FEMALE: Primary | ICD-10-CM

## 2024-08-21 PROCEDURE — 99213 OFFICE O/P EST LOW 20 MIN: CPT | Mod: 25 | Performed by: FAMILY MEDICINE

## 2024-08-21 PROCEDURE — 96372 THER/PROPH/DIAG INJ SC/IM: CPT | Performed by: FAMILY MEDICINE

## 2024-08-21 PROCEDURE — T1013 SIGN LANG/ORAL INTERPRETER: HCPCS | Mod: U3 | Performed by: FAMILY MEDICINE

## 2024-08-21 RX ADMIN — MEDROXYPROGESTERONE ACETATE 150 MG: 150 INJECTION, SUSPENSION INTRAMUSCULAR at 13:55

## 2024-08-21 NOTE — NURSING NOTE
"Chief Complaint   Patient presents with    Contraception     Follow up from starting depo-due for 2nd injection       Initial /76   Wt 63.5 kg (140 lb)   BMI 24.03 kg/m   Estimated body mass index is 24.03 kg/m  as calculated from the following:    Height as of 3/27/24: 1.626 m (5' 4\").    Weight as of this encounter: 63.5 kg (140 lb).  BP completed using cuff size: regular    Questioned patient about current smoking habits.  Pt. has never smoked.        "

## 2024-08-21 NOTE — LETTER
August 21, 2024      Sammie Ramirez  [address] [address]        To Whom It May Concern,     The above patient has been on a complicated treatment plan for pelvic pain and endometriosis.  She tried a few different Oral Contraceptives, which had side effects of nausea, headaches.  She then tried Mirena IUD for pelvic pain and endometriosis.  The Mirena IUD caused daily headaches and was removed for this reason. Her headaches then resolved.  She is now treated with the depo-provera injection and has no current side effects.  Please cover the cost of the IUD removal secondary to daily headaches.            Sincerely,           Krystin Sen, DO

## 2024-08-21 NOTE — PROGRESS NOTES
SUBJECTIVE:  Sammie Ramirez is an 48 year old   woman who presents for   gynecology consult for endometriosis, now managed with depo-provera injection,   Had IUD removed 2024 secondary to symptoms of headache from IUD.    She feels better on depo-provera, but has noted an decrease in hunger.      No LMP recorded. Patient has had an injection.     Current contraception: depoprovera  History of abnormal Pap smear: Yes: see below      Past Medical History:   Diagnosis Date    ASCUS (atypical squamous cells of undetermined significance) on Pap smear 10/13/2016    Neg HPV    History of colposcopy 2018    LSIL (low grade squamous intraepithelial lesion) on Pap smear 2014          Family History   Problem Relation Age of Onset    Breast Cancer Mother     Family History Negative Mother     Family History Negative Father     Prostate Cancer Father     Liver Cancer Father     Breast Cancer Maternal Grandmother     Myocardial Infarction Paternal Grandmother     Myocardial Infarction Paternal Grandfather        Past Surgical History:   Procedure Laterality Date    COLPOSCOPY, BIOPSY, COMBINED N/A 2024    Procedure: COLPOSCOPY;  Surgeon: Krystin Sen DO;  Location: [facility] OR    DAVINCI PELVIC PROCEDURE Right 2022    Procedure: Xi Robotic  endometriosis resection/fulguration, cervical biopsy, and endometrial biopsy;  Surgeon: Krystin Sen DO;  Location: [facility] OR    DILATION AND CURETTAGE, OPERATIVE HYSTEROSCOPY WITH MORCELLATOR, COMBINED N/A 2024    Procedure: Hysteroscopy, dilation and curettage, polypectomy with myosure, mirena intrauterine device insertion;  Surgeon: Krystin Sen DO;  Location: [facility] OR    INSERT INTRAUTERINE DEVICE N/A 2024    Procedure: Insert intrauterine device;  Surgeon: Krystin Sen DO;  Location: [facility] OR    LEEP TX, CERVICAL  x 2 per chart    historical    ORTHOPEDIC SURGERY         Current Outpatient Medications    Medication Sig Dispense Refill    acetaminophen (TYLENOL) 325 MG tablet Take 3 tablets (975 mg) by mouth every 6 hours as needed for mild pain 50 tablet 0    hydrocortisone 2.5 % cream Apply topically 2 times daily as needed for itching or other (hemorrhoids) 30 g 3    hydrocortisone, Perianal, (HYDROCORTISONE) 2.5 % cream Place rectally 2 times daily as needed for hemorrhoids 30 g 3    hydrOXYzine (VISTARIL) 50 MG capsule Take 1 tablet every 6 hours as needed for itching 12 capsule 0    hydrOXYzine HCl (ATARAX) 25 MG tablet Take 1 tablet (25 mg) by mouth 3 times daily as needed for itching 30 tablet 0    ibuprofen (ADVIL/MOTRIN) 800 MG tablet Take 1 tablet (800 mg) by mouth every 6 hours as needed for other (mild and/or inflammatory pain) 30 tablet 0    ketorolac (TORADOL) 10 MG tablet TAKE 1 TABLET(10 MG) BY MOUTH EVERY 6 HOURS AS NEEDED FOR MODERATE PAIN 20 tablet 1    levocetirizine (XYZAL) 5 MG tablet Take 1 tablet (5 mg) by mouth every evening 30 tablet 1    mometasone (ELOCON) 0.1 % external ointment Apply topically daily For up to 14 days 45 g 0    mometasone (NASONEX) 50 MCG/ACT nasal spray Spray 2 sprays into both nostrils daily 17 g 1    mupirocin (BACTROBAN) 2 % external ointment APPLY TOPICALLY TO THE AFFECTED AREA THREE TIMES DAILY 30 g 0    omeprazole (PRILOSEC) 40 MG DR capsule TAKE 1 CAPSULE(40 MG) BY MOUTH DAILY 90 capsule 3    ondansetron (ZOFRAN) 8 MG tablet TAKE 1 TABLET(8 MG) BY MOUTH EVERY 6-8 HOURS AS NEEDED FOR NAUSEA 20 tablet 1    polyethylene glycol (MIRALAX) 17 GM/Dose powder Take 17 g (1 capful) by mouth daily (Patient taking differently: Take 1 capful. by mouth daily as needed.) 500 g 0    rizatriptan (MAXALT) 10 MG tablet Take 1 tablet (10 mg) by mouth at onset of headache for migraine May repeat in 2 hours. Max 3 tablets/24 hours. 18 tablet 1    senna-docusate (SENOKOT-S/PERICOLACE) 8.6-50 MG tablet Take 1-2 tablets by mouth 2 times daily 30 tablet 3    SUMAtriptan (IMITREX) 100 MG  tablet Take 1 tablet (100 mg) by mouth at onset of headache for migraine that occur at night.  Take rizatriptan for migraines during the day. 9 tablet 3    triamcinolone (KENALOG) 0.1 % external cream APPLY SPARINGLY TOPICALLY TO THE AFFECTED AREA THREE TIMES DAILY FOR 14 DAYS 80 g 2    bisacodyl (DULCOLAX) 5 MG EC tablet Two days prior to exam take two (2) tablets at 4pm. One day prior to exam take two (2) tablets at 4pm (Patient not taking: Reported on 8/21/2024) 4 tablet 0    polyethylene glycol (GOLYTELY) 236 g suspension Take as directed in colonoscopy prep instructions (Patient not taking: Reported on 8/21/2024) 8000 mL 0     Current Facility-Administered Medications   Medication Dose Route Frequency Provider Last Rate Last Admin    medroxyPROGESTERone (DEPO-PROVERA) injection 150 mg  150 mg Intramuscular Q90 Days    150 mg at 05/20/24 1406     Allergies   Allergen Reactions    Amoxicillin      Rash, hives    Co-Trimoxazole [Sulfamethoxazole-Trimethoprim] Other (See Comments)     Yeast infection when taking this medication    Codeine Sulfate Nausea and Vomiting    Hydrocodone Nausea and Vomiting    Hydromorphone Itching     Light reaction, not severe    Lactose Diarrhea     dairy    Oxycodone Hives and Itching    Scopolamine Headache     Severe headache    Seasonal Allergies     Tramadol Headache and Nausea     Unsure if reaction was due to medication or COVID vaccine       Social History     Tobacco Use    Smoking status: Never    Smokeless tobacco: Never   Substance Use Topics    Alcohol use: Yes     Comment: wine 1-2 glasses on weekends       Review Of Systems  Ears/Nose/Throat: negative  Respiratory: No shortness of breath, dyspnea on exertion, cough, or hemoptysis  Cardiovascular: negative  Gastrointestinal: negative  Genitourinary: See HPI   Constitutional, HEENT, cardiovascular, pulmonary, GI, , musculoskeletal, neuro, skin, endocrine and psych systems are negative, except as otherwise  noted.    OBJECTIVE:  /76   Wt 63.5 kg (140 lb)   BMI 24.03 kg/m    General appearance: healthy, alert, and no distress  Skin: Skin color, texture, turgor normal. No rashes or lesions.  Ears: negative  Nose/Sinuses: Nares normal. Septum midline. Mucosa normal. No drainage or sinus tenderness.  Oropharynx: Lips, mucosa, and tongue normal. Teeth and gums normal.  Neck: Neck supple. No adenopathy. Thyroid symmetric, normal size,, Carotids without bruits.  Lungs: negative, Percussion normal. Good diaphragmatic excursion. Lungs clear  Heart: negative, PMI normal. No lifts, heaves, or thrills. RRR. No murmurs, clicks gallops or rub      ASSESSMENT:  Sammie Ramirez is an 48 year old   woman who presents for   gynecology consult for endometriosis, now managed with depo-provera injection,   Had IUD removed 2024 secondary to symptoms of headache from IUD.    She feels better on depo-provera, but has noted an decrease in hunger.      PLAN:  Dx:  1)  Depo-provera today, return in October for repeat injection and physical/pap smear   2)  Letter written for insurance         Dr. Krystin Sen, DO    Obstetrics and Gynecology  [facility] - [place] and [place]

## 2024-08-21 NOTE — PATIENT INSTRUCTIONS
Return for injection as planned     Dr. Krystin Sen, DO    Obstetrics and Gynecology  [facility] - [place] and [place]

## 2024-08-21 NOTE — NURSING NOTE
Clinic Administered Medication Documentation      Depo Provera Documentation    Depo-Provera Standing Order inclusion/exclusion criteria reviewed.     Is this the initial or subsequent dose of Depo Provera? Subsequent dose - patient is within the acceptable window of time (11-15 weeks) for subsequent injection. Pregnancy test not indicated.    Patient meets: inclusion criteria     Is there an active order (written within the past 365 days, with administrations remaining, not ) in the chart? Yes.     Prior to injection, verified patient identity using patient's name and date of birth. Medication was administered. Please see MAR and medication order for additional information.     Vial/Syringe: Single dose vial. Was entire vial of medication used? Yes    Patient instructed to report any adverse reaction to staff immediately.  NEXT INJECTION DUE: 24 - 24

## 2024-09-10 ENCOUNTER — MYC REFILL (OUTPATIENT)
Dept: FAMILY MEDICINE | Facility: CLINIC | Age: 49
End: 2024-09-10
Payer: MEDICARE

## 2024-09-10 DIAGNOSIS — Z98.890 STATUS POST HYSTEROSCOPY: ICD-10-CM

## 2024-09-11 RX ORDER — ACETAMINOPHEN 325 MG/1
975 TABLET ORAL EVERY 6 HOURS PRN
Qty: 50 TABLET | Refills: 0 | Status: SHIPPED | OUTPATIENT
Start: 2024-09-11

## 2024-09-30 ENCOUNTER — TELEPHONE (OUTPATIENT)
Dept: OBGYN | Facility: CLINIC | Age: 49
End: 2024-09-30
Payer: MEDICARE

## 2024-09-30 NOTE — TELEPHONE ENCOUNTER
[facility] Call Center    Phone Message    May a detailed message be left on voicemail: yes     Reason for Call: Patient is calling about a letter she received and would like to speak with a nurse. Thank you    Action Taken: Other: [facility]    Travel Screening: Not Applicable     Date of Service:

## 2024-09-30 NOTE — TELEPHONE ENCOUNTER
Spoke to pt, she asks if we have received a letter from her insurance asking for more information.    She wants Lynsey to call her back when she is able to discuss.     I did advise that I do not see any notes about this letter yet but she would call back from Lynsey.    Marysol ORDAZ RN BREANNN  [facility]

## 2024-10-01 NOTE — TELEPHONE ENCOUNTER
I called and spoke with Sammie via . Patient says she received a letter from her insurance stating they are not paying for her IUD removal. Per patient, letter states they need more information from the doctor.    Sammie will be dropping off a copy of the insurance letter to the [place] office. I advised Sammie that Dr. Sen will not be in the clinic until tomorrow afternoon.    I told Sammie I would give Dr. Sen the letter.    Lynsey Starks CMA

## 2024-10-02 NOTE — TELEPHONE ENCOUNTER
I called the Provider Assistance number on the letter from the pt. I followed the prompts and it required the state the services were provided in but [place] wasn't an option. There was no option to talk to a person.    I found an email to CMS (who the letter was from) and emailed a request for a telephone number that gets to a person and a fax number to fax requested documents. I have not received a reply yet.    Lynsey Starks CMA

## 2024-10-23 DIAGNOSIS — Z98.890 STATUS POST HYSTEROSCOPY: ICD-10-CM

## 2024-10-23 RX ORDER — ACETAMINOPHEN 325 MG/1
TABLET ORAL
Qty: 50 TABLET | Refills: 0 | Status: SHIPPED | OUTPATIENT
Start: 2024-10-23

## 2024-10-23 NOTE — TELEPHONE ENCOUNTER
Prescription for Tylenol sent for patient.     Yaneth Song RN  [facility]

## 2024-11-18 ENCOUNTER — OFFICE VISIT (OUTPATIENT)
Dept: OBGYN | Facility: CLINIC | Age: 49
End: 2024-11-18
Payer: MEDICARE

## 2024-11-18 VITALS — WEIGHT: 144 LBS | DIASTOLIC BLOOD PRESSURE: 78 MMHG | BODY MASS INDEX: 24.72 KG/M2 | SYSTOLIC BLOOD PRESSURE: 136 MMHG

## 2024-11-18 DIAGNOSIS — Z98.890 S/P LEEP OF CERVIX: Primary | ICD-10-CM

## 2024-11-18 DIAGNOSIS — N80.9 ENDOMETRIOSIS: ICD-10-CM
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
## 2024-11-18 DIAGNOSIS — R10.2 PELVIC PAIN IN FEMALE: ICD-10-CM

## 2024-11-18 DIAGNOSIS — Z01.419 ENCOUNTER FOR GYNECOLOGICAL EXAMINATION (GENERAL) (ROUTINE) WITHOUT ABNORMAL FINDINGS: ICD-10-CM

## 2024-11-18 DIAGNOSIS — Z13.9 SCREENING FOR CONDITION: ICD-10-CM

## 2024-11-18 PROCEDURE — 87210 SMEAR WET MOUNT SALINE/INK: CPT | Performed by: FAMILY MEDICINE

## 2024-11-18 PROCEDURE — 87491 CHLMYD TRACH DNA AMP PROBE: CPT | Performed by: FAMILY MEDICINE

## 2024-11-18 PROCEDURE — 87591 N.GONORRHOEAE DNA AMP PROB: CPT | Performed by: FAMILY MEDICINE

## 2024-11-18 PROCEDURE — 96372 THER/PROPH/DIAG INJ SC/IM: CPT | Performed by: FAMILY MEDICINE

## 2024-11-18 PROCEDURE — G0476 HPV COMBO ASSAY CA SCREEN: HCPCS | Performed by: FAMILY MEDICINE

## 2024-11-18 PROCEDURE — 99459 PELVIC EXAMINATION: CPT | Performed by: FAMILY MEDICINE

## 2024-11-18 PROCEDURE — 99396 PREV VISIT EST AGE 40-64: CPT | Mod: 25 | Performed by: FAMILY MEDICINE

## 2024-11-18 PROCEDURE — 99213 OFFICE O/P EST LOW 20 MIN: CPT | Mod: 25 | Performed by: FAMILY MEDICINE

## 2024-11-18 RX ORDER — FLUCONAZOLE 150 MG/1
150 TABLET ORAL SEE ADMIN INSTRUCTIONS
Qty: 3 TABLET | Refills: 0 | Status: SHIPPED | OUTPATIENT
Start: 2024-11-18

## 2024-11-18 RX ORDER — DOXYCYCLINE 100 MG/1
100 CAPSULE ORAL 2 TIMES DAILY
Qty: 10 CAPSULE | Refills: 0 | Status: SHIPPED | OUTPATIENT
Start: 2024-11-18 | End: 2024-11-23

## 2024-11-18 RX ADMIN — MEDROXYPROGESTERONE ACETATE 150 MG: 150 INJECTION, SUSPENSION INTRAMUSCULAR at 13:57

## 2024-11-18 NOTE — PATIENT INSTRUCTIONS
For fasting labs (for cholesterol):  please Call [phone] to schedule lab    Or if you prefer you can get non-fasting cholesterol and all the labs today    To schedule mammogram they will call you, or you can call [phone] to schedule it!    Dr. Krystin Sen, DO    Obstetrics and Gynecology  [facility] and [place]

## 2024-11-18 NOTE — PROGRESS NOTES
Clinic Administered Medication Documentation      Depo Provera Documentation    Depo-Provera Standing Order inclusion/exclusion criteria reviewed.     Is this the initial or subsequent dose of Depo Provera? Subsequent dose - patient is within the acceptable window of time (11-15 weeks) for subsequent injection. Pregnancy test not indicated.    Patient meets: inclusion criteria     Is there an active order (written within the past 365 days, with administrations remaining, not ) in the chart? Yes.     Prior to injection, verified patient identity using patient's name and date of birth. Medication was administered. Please see MAR and medication order for additional information.     Vial/Syringe: Single dose vial. Was entire vial of medication used? Yes    Patient instructed to remain in clinic for 15 minutes and report any adverse reaction to staff immediately.  NEXT INJECTION DUE: 2/3/25 - 3/3/25    Verified that the patient has refills remaining in their prescription.    Given: STEPHANIE Starks CMA

## 2024-11-18 NOTE — PROGRESS NOTES
SUBJECTIVE:  Sammie Ramirez is an 48 year old  woman who presents for   annual gyn exam. No LMP recorded. Patient has had an injection. Periods are rare, lasting   4 days. Dysmenorrhea:mild, occurring premenstrually and first 1-2 days of flow. Cyclic symptoms   include none. No intermenstrual bleeding,   spotting, or discharge.  Menarche age [age].  STD hx: none.    Current contraception: depoprovera  BINA exposure: no  History of abnormal Pap smear: Yes: hx of LEEP, now normal   Family history of uterine or ovarian cancer: No  Regular self breast exam: Yes  History of abnormal mammogram: No  Family history of breast cancer: Mother with stage 3 age 66, MGM   History of abnormal lipids: No    Past Medical History:   Diagnosis Date    ASCUS (atypical squamous cells of undetermined significance) on Pap smear 10/13/2016    Neg HPV    History of colposcopy 2018    LSIL (low grade squamous intraepithelial lesion) on Pap smear 2014        Family History   Problem Relation Age of Onset    Breast Cancer Mother     Family History Negative Mother     Family History Negative Father     Prostate Cancer Father     Liver Cancer Father     Breast Cancer Maternal Grandmother     Myocardial Infarction Paternal Grandmother     Myocardial Infarction Paternal Grandfather        Past Surgical History:   Procedure Laterality Date    COLPOSCOPY, BIOPSY, COMBINED N/A 2024    Procedure: COLPOSCOPY;  Surgeon: Krystin Sen DO;  Location:  OR    DAVINCI PELVIC PROCEDURE Right 2022    Procedure: Xi Robotic  endometriosis resection/fulguration, cervical biopsy, and endometrial biopsy;  Surgeon: Krystin Sen DO;  Location:  OR    DILATION AND CURETTAGE, OPERATIVE HYSTEROSCOPY WITH MORCELLATOR, COMBINED N/A 2024    Procedure: Hysteroscopy, dilation and curettage, polypectomy with myosure, mirena intrauterine device insertion;  Surgeon: Krystin Sen DO;  Location:  OR     INSERT INTRAUTERINE DEVICE N/A 1/31/2024    Procedure: Insert intrauterine device;  Surgeon: Krystin Sen DO;  Location: [facility] OR    LEEP TX, CERVICAL  x 2 per chart    historical    ORTHOPEDIC SURGERY  2005       Current Outpatient Medications   Medication Sig Dispense Refill    acetaminophen (TYLENOL) 325 MG tablet TAKE 3 TABLETS(975 MG) BY MOUTH EVERY 6 HOURS AS NEEDED FOR MILD PAIN 50 tablet 0    bisacodyl (DULCOLAX) 5 MG EC tablet Two days prior to exam take two (2) tablets at 4pm. One day prior to exam take two (2) tablets at 4pm (Patient not taking: Reported on 8/21/2024) 4 tablet 0    hydrocortisone 2.5 % cream Apply topically 2 times daily as needed for itching or other (hemorrhoids) 30 g 3    hydrocortisone, Perianal, (HYDROCORTISONE) 2.5 % cream Place rectally 2 times daily as needed for hemorrhoids 30 g 3    hydrOXYzine (VISTARIL) 50 MG capsule Take 1 tablet every 6 hours as needed for itching 12 capsule 0    hydrOXYzine HCl (ATARAX) 25 MG tablet Take 1 tablet (25 mg) by mouth 3 times daily as needed for itching 30 tablet 0    ibuprofen (ADVIL/MOTRIN) 800 MG tablet Take 1 tablet (800 mg) by mouth every 6 hours as needed for other (mild and/or inflammatory pain) 30 tablet 0    ketorolac (TORADOL) 10 MG tablet TAKE 1 TABLET(10 MG) BY MOUTH EVERY 6 HOURS AS NEEDED FOR MODERATE PAIN 20 tablet 1    levocetirizine (XYZAL) 5 MG tablet Take 1 tablet (5 mg) by mouth every evening 30 tablet 1    mometasone (ELOCON) 0.1 % external ointment Apply topically daily For up to 14 days 45 g 0    mometasone (NASONEX) 50 MCG/ACT nasal spray Spray 2 sprays into both nostrils daily 17 g 1    mupirocin (BACTROBAN) 2 % external ointment APPLY TOPICALLY TO THE AFFECTED AREA THREE TIMES DAILY 30 g 0    omeprazole (PRILOSEC) 40 MG DR capsule TAKE 1 CAPSULE(40 MG) BY MOUTH DAILY 90 capsule 3    ondansetron (ZOFRAN) 8 MG tablet TAKE 1 TABLET(8 MG) BY MOUTH EVERY 6-8 HOURS AS NEEDED FOR NAUSEA 20 tablet 1    polyethylene glycol  (MIRALAX) 17 GM/Dose powder Take 17 g (1 capful) by mouth daily (Patient taking differently: Take 1 capful. by mouth daily as needed.) 500 g 0    rizatriptan (MAXALT) 10 MG tablet Take 1 tablet (10 mg) by mouth at onset of headache for migraine May repeat in 2 hours. Max 3 tablets/24 hours. 18 tablet 1    senna-docusate (SENOKOT-S/PERICOLACE) 8.6-50 MG tablet Take 1-2 tablets by mouth 2 times daily 30 tablet 3    SUMAtriptan (IMITREX) 100 MG tablet Take 1 tablet (100 mg) by mouth at onset of headache for migraine that occur at night.  Take rizatriptan for migraines during the day. 9 tablet 3    triamcinolone (KENALOG) 0.1 % external cream APPLY SPARINGLY TOPICALLY TO THE AFFECTED AREA THREE TIMES DAILY FOR 14 DAYS 80 g 2     Current Facility-Administered Medications   Medication Dose Route Frequency Provider Last Rate Last Admin    medroxyPROGESTERone (DEPO-PROVERA) injection 150 mg  150 mg Intramuscular Q90 Days    150 mg at 11/18/24 1357     Allergies   Allergen Reactions    Amoxicillin      Rash, hives    Co-Trimoxazole [Sulfamethoxazole-Trimethoprim] Other (See Comments)     Yeast infection when taking this medication    Codeine Sulfate Nausea and Vomiting    Hydrocodone Nausea and Vomiting    Hydromorphone Itching     Light reaction, not severe    Lactose Diarrhea     dairy    Oxycodone Hives and Itching    Scopolamine Headache     Severe headache    Seasonal Allergies     Tramadol Headache and Nausea     Unsure if reaction was due to medication or COVID vaccine       Social History     Tobacco Use    Smoking status: Never    Smokeless tobacco: Never   Substance Use Topics    Alcohol use: Yes     Comment: wine 1-2 glasses on weekends       Review Of Systems  Ears/Nose/Throat: negative  Respiratory: No shortness of breath, dyspnea on exertion, cough, or hemoptysis  Cardiovascular: negative  Gastrointestinal: negative  Genitourinary: See HPI   Constitutional, HEENT, cardiovascular, pulmonary, GI, ,  musculoskeletal, neuro, skin, endocrine and psych systems are negative, except as otherwise noted.      OBJECTIVE:  /78   Wt 65.3 kg (144 lb)   BMI 24.72 kg/m      General appearance: healthy, alert and no distress  Skin: Skin color, texture, turgor normal. No rashes or lesions.  Ears: negative  Nose/Sinuses: Nares normal. Septum midline. Mucosa normal. No drainage or sinus tenderness.  Oropharynx: Lips, mucosa, and tongue normal. Teeth and gums normal.  Neck: Neck supple. No adenopathy. Thyroid symmetric, normal size,, Carotids without bruits.  Lungs: negative, Percussion normal. Good diaphragmatic excursion. Lungs clear  Heart: negative, PMI normal. No lifts, heaves, or thrills. RRR. No murmurs, clicks gallops or rub  Breasts: Inspection negative. No nipple discharge or bleeding. No masses.  Abdomen: Abdomen soft, non-tender. BS normal. No masses, organomegaly  Pelvic: Pelvic:  Pelvic examination with  pap/ wet prep and  Gonorrhea and Chlamydia   including  External genitalia normal   and vagina normal rugatted not atrophic  Examination of urethra  normal no masses, tenderness, scarring  bladder, no masses or tenderness  Cervix  no lesions or discharge  Bimanual exam with   Uterus 6 weeks size, mi position, mobile,left sided-tenderness, no descent   Adnexa/parametria  normal no masses         ASSESSMENT:  Sammie Ramirez is an 48 year old  woman who presents for   annual gyn exam.   PLAN:  Dx:  1)  Pap smear completed  Gonorrhea  Chlamydia decline  2)  Mammography yearly, lipids at appropriate intervals yearly   Colonoscopy due   3)  Contraception: depo-provera   4)  Endometriosis, pelvic pain:  managed with depo-provera.  Tried a myriad of treatments, Oral Contraceptive   And IUD which caused headaches.  Notes weight gain:  Discussed other options.    She is considering repeat Laparoscopic endometriosis resection.  Also discussed hysterectomy since she doesn't like the side effects of depo, She  is not sure about   Hysterectomy.   Having left pelvic pain, could remove left ovary   Offered pelvic us and she has declined this for now  5)  Cat bite on lip:  Rx doxycycline 100 mg po BID x 5 days, advised to see pcp and is going to see them.          PE:  Reviewed health maintenance including diet, regular exercise   and periodic exams.    Dr. Krystin Sen, DO    Obstetrics and Gynecology  [facility] - [place] and [place]

## 2024-11-18 NOTE — NURSING NOTE
"Chief Complaint   Patient presents with    Gyn Exam     Starting to have sharp pain again    Contraception     Depo injection       Initial /78   Wt 65.3 kg (144 lb)   BMI 24.72 kg/m   Estimated body mass index is 24.72 kg/m  as calculated from the following:    Height as of 3/27/24: 1.626 m (5' 4\").    Weight as of this encounter: 65.3 kg (144 lb).  BP completed using cuff size: regular    Questioned patient about current smoking habits.  Pt. has never smoked.          The following HM Due: pap smear    "

## 2024-11-19 LAB
C TRACH DNA SPEC QL NAA+PROBE: NEGATIVE
N GONORRHOEA DNA SPEC QL NAA+PROBE: NEGATIVE

## 2024-11-20 LAB
HPV HR 12 DNA CVX QL NAA+PROBE: POSITIVE
HPV16 DNA CVX QL NAA+PROBE: NEGATIVE
HPV18 DNA CVX QL NAA+PROBE: NEGATIVE
HUMAN PAPILLOMA VIRUS FINAL DIAGNOSIS: ABNORMAL

## 2024-11-21 ENCOUNTER — OFFICE VISIT (OUTPATIENT)
Dept: FAMILY MEDICINE | Facility: CLINIC | Age: 49
End: 2024-11-21
Payer: MEDICARE

## 2024-11-21 VITALS
TEMPERATURE: 97.8 F | DIASTOLIC BLOOD PRESSURE: 80 MMHG | HEIGHT: 64 IN | OXYGEN SATURATION: 100 % | SYSTOLIC BLOOD PRESSURE: 142 MMHG | HEART RATE: 59 BPM | RESPIRATION RATE: 18 BRPM | BODY MASS INDEX: 24.55 KG/M2 | WEIGHT: 143.8 LBS
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
## 2024-11-21 DIAGNOSIS — B37.31 YEAST INFECTION OF THE VAGINA: ICD-10-CM

## 2024-11-21 RX ORDER — FLUCONAZOLE 150 MG/1
150 TABLET ORAL
Qty: 3 TABLET | Refills: 1 | Status: SHIPPED | OUTPATIENT
Start: 2024-11-21 | End: 2024-12-07

## 2024-11-21 RX ORDER — METRONIDAZOLE 500 MG/1
500 TABLET ORAL 3 TIMES DAILY
Qty: 30 TABLET | Refills: 0 | Status: SHIPPED | OUTPATIENT
Start: 2024-11-21 | End: 2024-12-01

## 2024-11-21 RX ORDER — DOXYCYCLINE 100 MG/1
100 CAPSULE ORAL 2 TIMES DAILY
Qty: 14 CAPSULE | Refills: 0 | Status: SHIPPED | OUTPATIENT
Start: 2024-11-21 | End: 2024-11-28

## 2024-11-21 ASSESSMENT — PAIN SCALES - GENERAL: PAINLEVEL_OUTOF10: MODERATE PAIN (5)

## 2024-11-21 NOTE — PROGRESS NOTES
Assessment & Plan     (W55.01XD) Cat bite, subsequent encounter  (primary encounter diagnosis)  Comment: Would like to add on Flagyl to doxycycline.  Concerned patient cat bite may be worsening.  Will also provide extended regimen of doxycycline. Discussed medication risks and benefits of Flagyl and doxycycline with patient in detail with patient verbal understanding.  Would like to have patient follow-up early next week to ensure progressing appropriately.  Discussed that if it exponentially worsens that she should be seen right away via urgent care/ED. Patient fully understands and is agreeable with plan of care, at this point patient will follow up as needed unless acute concerns arise in the meantime.  Plan: metroNIDAZOLE (FLAGYL) 500 MG tablet, PRIMARY         CARE FOLLOW-UP SCHEDULING, doxycycline hyclate         (VIBRAMYCIN) 100 MG capsule    (B37.31) Yeast infection of the vagina  Comment: History of with antibiotics.  Fluconazole ordered. Discussed medication risks and benefits of fluconazole with patient in detail with patient verbal understanding. Patient fully understands and is agreeable with plan of care, at this point patient will follow up as needed unless acute concerns arise in the meantime.  Plan: fluconazole (DIFLUCAN) 150 MG tablet      The longitudinal plan of care for the diagnosis(es)/condition(s) as documented were addressed during this visit. Due to the added complexity in care, I will continue to support Sammie in the subsequent management and with ongoing continuity of care.    Follow-up early next week for recheck    Subjective   Sammie is a 48 year old, presenting for the following health issues:  Cat Bite (Follow Up)        11/21/2024     2:42 PM   Additional Questions   Roomed by Jazmín Sexton, EMT-B     History of Present Illness       Reason for visit:  Follow up cat bitten on my chin/jaw on 11/17/24    She eats 2-3 servings of fruits and vegetables daily.She consumes 2 sweetened  "beverage(s) daily. She exercises with enough effort to increase her heart rate 7 days per week.   She is taking medications regularly.     Pt is being seen today to follow up on a cat bite that occurred on Sunday. Pt reports the area has improved since last seen and was told to follow up in case more antibiotics are necessary. Pt reports receiving antibiotic on Monday had a fever on Tuesday, not since.    Review of Systems  Constitutional, skin systems are negative, except as otherwise noted.      Objective    BP (!) 142/80 (BP Location: Right arm, Patient Position: Sitting, Cuff Size: Adult Regular)   Pulse 59   Temp 97.8  F (36.6  C) (Oral)   Resp 18   Ht 1.626 m (5' 4\")   Wt 65.2 kg (143 lb 12.8 oz)   SpO2 100%   BMI 24.68 kg/m    Body mass index is 24.68 kg/m .  Physical Exam  Vitals and nursing note reviewed.   Constitutional:       Appearance: Normal appearance. She is well-developed. She is not ill-appearing or toxic-appearing.   Cardiovascular:      Rate and Rhythm: Normal rate.   Pulmonary:      Effort: Pulmonary effort is normal.   Skin:     Comments: Cat bite, see picture for details.  Erythema and swelling noticed by bite leonel along right TMJ area, tenderness to touch   Neurological:      Mental Status: She is alert.   Psychiatric:         Attention and Perception: Attention and perception normal.         Mood and Affect: Mood normal.         Speech: Speech normal.         Behavior: Behavior normal. Behavior is cooperative.         Thought Content: Thought content normal.         Judgment: Judgment normal.            Signed Electronically by: JORGE Juárez CNP    "

## 2024-11-26 LAB
BKR AP ASSOCIATED HPV REPORT: ABNORMAL
BKR LAB AP GYN ADEQUACY: ABNORMAL
BKR LAB AP GYN INTERPRETATION: ABNORMAL
BKR LAB AP PREVIOUS ABNL DX: ABNORMAL
BKR LAB AP PREVIOUS ABNORMAL: ABNORMAL
PATH REPORT.COMMENTS IMP SPEC: ABNORMAL
PATH REPORT.COMMENTS IMP SPEC: ABNORMAL
PATH REPORT.RELEVANT HX SPEC: ABNORMAL

## 2024-11-27 ENCOUNTER — TELEPHONE (OUTPATIENT)
Dept: OBGYN | Facility: CLINIC | Age: 49
End: 2024-11-27
Payer: MEDICARE

## 2024-11-27 NOTE — TELEPHONE ENCOUNTER
Please advise colposcopy recommended and she can schedule this   Dr. Krystin Sen, DO    Obstetrics and Gynecology  [facility] - [place] and [place]

## 2024-11-27 NOTE — TELEPHONE ENCOUNTER
Pt called wanting to know what plan will be for follow-up care regarding results of PAP smear on 11/18.    Please advise.     ZEINA Pineda

## 2024-12-04 NOTE — TELEPHONE ENCOUNTER
Spoke with the pt via a .   See the notes attached to the pap smear.   Pt declines the colp.  Pt was advised to cotest in 1 year.    Sakshi LANTIGUA RN  [facility]

## 2024-12-09 ENCOUNTER — LAB (OUTPATIENT)
Dept: LAB | Facility: CLINIC | Age: 49
End: 2024-12-09
Payer: MEDICARE

## 2024-12-09 DIAGNOSIS — Z13.9 SCREENING FOR CONDITION: ICD-10-CM

## 2024-12-09 LAB
ALBUMIN SERPL BCG-MCNC: 4.2 G/DL (ref 3.5–5.2)
ALP SERPL-CCNC: 41 U/L (ref 40–150)
ALT SERPL W P-5'-P-CCNC: 13 U/L (ref 0–50)
ANION GAP SERPL CALCULATED.3IONS-SCNC: 11 MMOL/L (ref 7–15)
AST SERPL W P-5'-P-CCNC: 18 U/L (ref 0–45)
BILIRUB SERPL-MCNC: 0.5 MG/DL
BUN SERPL-MCNC: 10 MG/DL (ref 6–20)
CALCIUM SERPL-MCNC: 9.5 MG/DL (ref 8.8–10.4)
CHLORIDE SERPL-SCNC: 108 MMOL/L (ref 98–107)
CHOLEST SERPL-MCNC: 216 MG/DL
CREAT SERPL-MCNC: 0.9 MG/DL (ref 0.51–0.95)
EGFRCR SERPLBLD CKD-EPI 2021: 78 ML/MIN/1.73M2
ERYTHROCYTE [DISTWIDTH] IN BLOOD BY AUTOMATED COUNT: 12.7 % (ref 10–15)
FASTING STATUS PATIENT QL REPORTED: YES
FASTING STATUS PATIENT QL REPORTED: YES
GLUCOSE SERPL-MCNC: 98 MG/DL (ref 70–99)
HCO3 SERPL-SCNC: 23 MMOL/L (ref 22–29)
HCT VFR BLD AUTO: 40.4 % (ref 35–47)
HDLC SERPL-MCNC: 57 MG/DL
HGB BLD-MCNC: 14 G/DL (ref 11.7–15.7)
LDLC SERPL CALC-MCNC: 138 MG/DL
MCH RBC QN AUTO: 29.2 PG (ref 26.5–33)
MCHC RBC AUTO-ENTMCNC: 34.7 G/DL (ref 31.5–36.5)
MCV RBC AUTO: 84 FL (ref 78–100)
NONHDLC SERPL-MCNC: 159 MG/DL
PLATELET # BLD AUTO: 210 10E3/UL (ref 150–450)
POTASSIUM SERPL-SCNC: 3.9 MMOL/L (ref 3.4–5.3)
PROT SERPL-MCNC: 6.7 G/DL (ref 6.4–8.3)
RBC # BLD AUTO: 4.79 10E6/UL (ref 3.8–5.2)
SODIUM SERPL-SCNC: 142 MMOL/L (ref 135–145)
TRIGL SERPL-MCNC: 104 MG/DL
TSH SERPL DL<=0.005 MIU/L-ACNC: 1.24 UIU/ML (ref 0.3–4.2)
WBC # BLD AUTO: 4.9 10E3/UL (ref 4–11)

## 2024-12-09 PROCEDURE — 36415 COLL VENOUS BLD VENIPUNCTURE: CPT

## 2024-12-09 PROCEDURE — 80053 COMPREHEN METABOLIC PANEL: CPT

## 2024-12-09 PROCEDURE — 80061 LIPID PANEL: CPT

## 2024-12-09 PROCEDURE — 84443 ASSAY THYROID STIM HORMONE: CPT | Mod: GZ

## 2024-12-09 PROCEDURE — 85027 COMPLETE CBC AUTOMATED: CPT

## 2024-12-17 ENCOUNTER — OFFICE VISIT (OUTPATIENT)
Dept: FAMILY MEDICINE | Facility: CLINIC | Age: 49
End: 2024-12-17
Payer: MEDICARE

## 2024-12-17 VITALS
TEMPERATURE: 97.6 F | BODY MASS INDEX: 24.38 KG/M2 | DIASTOLIC BLOOD PRESSURE: 75 MMHG | WEIGHT: 142.8 LBS | RESPIRATION RATE: 16 BRPM | SYSTOLIC BLOOD PRESSURE: 138 MMHG | HEART RATE: 78 BPM | OXYGEN SATURATION: 100 % | HEIGHT: 64 IN

## 2024-12-17 DIAGNOSIS — S80.212D ABRASION OF LEFT KNEE, SUBSEQUENT ENCOUNTER: Primary | ICD-10-CM

## 2024-12-17 DIAGNOSIS — M25.562 PAIN AND SWELLING OF LEFT KNEE: ICD-10-CM

## 2024-12-17 DIAGNOSIS — M25.462 PAIN AND SWELLING OF LEFT KNEE: ICD-10-CM

## 2024-12-17 PROCEDURE — 99213 OFFICE O/P EST LOW 20 MIN: CPT

## 2024-12-17 PROCEDURE — G2211 COMPLEX E/M VISIT ADD ON: HCPCS
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
## 2024-12-23 ENCOUNTER — TELEPHONE (OUTPATIENT)
Dept: FAMILY MEDICINE | Facility: CLINIC | Age: 49
End: 2024-12-23

## 2024-12-23 ENCOUNTER — OFFICE VISIT (OUTPATIENT)
Dept: URGENT CARE | Facility: URGENT CARE | Age: 49
End: 2024-12-23
Payer: MEDICARE

## 2024-12-23 ENCOUNTER — ANCILLARY PROCEDURE (OUTPATIENT)
Dept: GENERAL RADIOLOGY | Facility: CLINIC | Age: 49
End: 2024-12-23
Attending: PHYSICIAN ASSISTANT
Payer: MEDICARE

## 2024-12-23 VITALS
DIASTOLIC BLOOD PRESSURE: 74 MMHG | HEART RATE: 72 BPM | WEIGHT: 142 LBS | TEMPERATURE: 98.2 F | RESPIRATION RATE: 14 BRPM | OXYGEN SATURATION: 98 % | BODY MASS INDEX: 24.37 KG/M2 | SYSTOLIC BLOOD PRESSURE: 124 MMHG

## 2024-12-23 DIAGNOSIS — M25.562 ACUTE PAIN OF LEFT KNEE: ICD-10-CM

## 2024-12-23 DIAGNOSIS — S82.002A CLOSED DISPLACED FRACTURE OF LEFT PATELLA, UNSPECIFIED FRACTURE MORPHOLOGY, INITIAL ENCOUNTER: Primary | ICD-10-CM

## 2024-12-23 PROCEDURE — 99214 OFFICE O/P EST MOD 30 MIN: CPT | Performed by: PHYSICIAN ASSISTANT

## 2024-12-23 PROCEDURE — 73562 X-RAY EXAM OF KNEE 3: CPT | Mod: TC | Performed by: RADIOLOGY

## 2024-12-23 NOTE — TELEPHONE ENCOUNTER
"Carlos Eduardo from [facility] calling. Patient is at the [facility].     Patient saw provider on for a knee issue on 12/17/24. Per visit note \"Pain and swelling of left knee  Comment: suspect d/t trauma. Patient would like to defer Xray for now. Discussed w/ patient that if pain and swelling persist over the next 1-2 weeks to follow up for imaging and evaluation.\"    Patient reports continued pain today. There are no orders for an xray. Patient is requesting xray today. Please advise.     Citlaly Gerber RN 12/23/2024 11:43 AM  [facility]          "
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
## 2024-12-23 NOTE — PATIENT INSTRUCTIONS
Patient was educated on the natural course of injury.  X-ray of left knee shows mildly displaced fracture of the lateral patellar facet. Conservative measures discussed including rest, ice, and over-the-counter analgesics (Tylenol or Ibuprofen) as needed. Referred to ortho emergently. Seek emergency care if you develop severe pain/swelling, inability to move extremity, skin paleness, or weakness.

## 2024-12-23 NOTE — TELEPHONE ENCOUNTER
RN huddled with JORGE Juárez CNP who recommended patient be evaluated by provider at     RN called and spoke with patient using    Relayed message from provider   Patient is very upset as she has been sitting at the urgent care since 10:30am today   RN discussed that the  patient spoke to earlier today should have better explained that patient would need to see a provider at the  since  Clinic does not have any openings, patient thought she was just going for an x-ray   RN advised patient to check-in at the   and ask to be seen by a provider    Patient was given an opportunity to ask questions, verbalized understanding of plan, and is agreeable.     Maria Luz MONTIEL RN  [facility]

## 2024-12-23 NOTE — PATIENT INSTRUCTIONS
Discharge Summary    CHIEF COMPLAINT ON ADMISSION  Recurrent lung cancer    Reason for Admission  Malignant neoplasm right lung    Admission Date  12/20/2024    CODE STATUS  Full Code    HPI & HOSPITAL COURSE  This is a 57 y.o. male here with a history of previous right middle lobar lobectomy over a year ago for cancer.  He developed a recurrence in the right upper lobe which was treated with radiation.  Follow-up scan showed this area to be enlarging with uptake on PET and a second nodule with uptake on PET suspicious for recurrent disease.  He was presented at the multidisciplinary lung tumor board and it was elected to proceed with completion pneumonectomy.  He underwent this procedure with no intraoperative or postoperative complications.  Had a chest tube in overnight and this was removed the following day.  For the next several days he was ambulating using incentive spirometry and pain was controlled with oral medications.  He does desaturate on room air both at rest and ambulating and therefore he will be set up with home oxygen.  No notes on file    Therefore, he is discharged in good and stable condition to home with close outpatient follow-up.    The patient met 2-midnight criteria for an inpatient stay at the time of discharge.    Discharge Date  12/23/2024    FOLLOW UP ITEMS POST DISCHARGE  Await pathology results    DISCHARGE DIAGNOSES  Active Problems:    Adenocarcinoma, lung (HCC) (POA: Yes)      Overview: Diagnosed September 2022.  Patient underwent chemo and surgery (right       middle and lower lobe removed).  Follows up with oncology (Dr. Howe) and       gets CT every 3 months.  Nov 2024 PET showed 3.7 cm mass suspicious for       recurrent lung carcinoma and additional 0.8 cm RUL nodule suspicious for       carcinoma.  Resolved Problems:    * No resolved hospital problems. *      FOLLOW UP  Future Appointments   Date Time Provider Department Center   1/13/2025  9:30 AM Domenic Gil M.D.  Do not take ibuprofen, naproxen, meloxicam, or aspirin for 7 days before upcoming surgery. Use acetaminophen instead for pain relief if needed.      [facility] None   5/29/2025  9:40 AM Mariya Mills M.D. [facility]     No follow-up provider specified.    MEDICATIONS ON DISCHARGE     Medication List        START taking these medications        Instructions   oxyCODONE immediate-release 5 MG Tabs  Commonly known as: Roxicodone   Take 1-2 Tablets by mouth every four hours as needed for Severe Pain for up to 3 days.  Dose: 5-10 mg            CONTINUE taking these medications        Instructions   acetaminophen 500 MG Tabs  Commonly known as: Tylenol   Take 500-1,000 mg by mouth every 6 hours as needed for Mild Pain.  Dose: 500-1,000 mg     buPROPion 300 MG XL tablet  Commonly known as: Wellbutrin XL   Take 1 Tablet by mouth every morning.  Dose: 300 mg     hydroCHLOROthiazide 50 MG Tabs   Doctor's comments: Needs appt for refills  Take 1 Tablet by mouth every day.  Dose: 50 mg     MULTIVITAMINS PO   Take 1 Tablet by mouth every day.  Dose: 1 Tablet     PSYLLIUM PO   Take 5 Capsules by mouth 1 time a day as needed.  Dose: 5 Capsule     Saw Palmetto 500 MG Caps   Take 540 mg by mouth every day. (Takes 6 capsules daily)  Dose: 540 mg     ZINC EXTRA STRENGTH PO   Take 50 mg by mouth every day.  Dose: 50 mg            ASK your doctor about these medications        Instructions   furosemide 20 MG Tabs  Commonly known as: Lasix   Take 1 Tablet by mouth 1 time a day as needed (leg swelling).  Dose: 20 mg              Allergies  Allergies   Allergen Reactions    Mefoxin Swelling     Swelling everywhere       DIET  Orders Placed This Encounter   Procedures    Diet Order Diet: Regular     Standing Status:   Standing     Number of Occurrences:   1     Order Specific Question:   ERAS     Answer:   Yes     Order Specific Question:   Diet:     Answer:   Regular [1]       ACTIVITY  Light duty.    CONSULTATIONS  None    PROCEDURES  Completion right pneumonectomy    LABORATORY  Lab Results   Component Value Date    SODIUM 138 12/21/2024    POTASSIUM 4.4 12/21/2024    CHLORIDE 102  12/21/2024    CO2 27 12/21/2024    GLUCOSE 178 (H) 12/21/2024    BUN 17 12/21/2024    CREATININE 0.74 12/21/2024        Lab Results   Component Value Date    WBC 10.6 12/22/2024    HEMOGLOBIN 9.7 (L) 12/22/2024    HEMATOCRIT 29.8 (L) 12/22/2024    PLATELETCT 136 (L) 12/22/2024

## 2024-12-23 NOTE — TELEPHONE ENCOUNTER
Routing to JORGE Juárez CNP-  please review and advise. Are you willing to place order for x-ray or do you want patient to have follow-up with a provider first?     Maria Luz MONTIEL RN  [facility]

## 2024-12-24 ENCOUNTER — TELEPHONE (OUTPATIENT)
Dept: FAMILY MEDICINE | Facility: CLINIC | Age: 49
End: 2024-12-24

## 2024-12-24 ENCOUNTER — OFFICE VISIT (OUTPATIENT)
Dept: ORTHOPEDICS | Facility: CLINIC | Age: 49
End: 2024-12-24
Attending: PHYSICIAN ASSISTANT
Payer: MEDICARE

## 2024-12-24 ENCOUNTER — MYC REFILL (OUTPATIENT)
Dept: FAMILY MEDICINE | Facility: CLINIC | Age: 49
End: 2024-12-24

## 2024-12-24 ENCOUNTER — MYC REFILL (OUTPATIENT)
Dept: OBGYN | Facility: CLINIC | Age: 49
End: 2024-12-24

## 2024-12-24 VITALS
HEIGHT: 64 IN | BODY MASS INDEX: 24.24 KG/M2 | WEIGHT: 142 LBS | DIASTOLIC BLOOD PRESSURE: 83 MMHG | SYSTOLIC BLOOD PRESSURE: 139 MMHG

## 2024-12-24 DIAGNOSIS — S82.002A CLOSED DISPLACED FRACTURE OF LEFT PATELLA, UNSPECIFIED FRACTURE MORPHOLOGY, INITIAL ENCOUNTER: ICD-10-CM

## 2024-12-24 DIAGNOSIS — Z98.890 STATUS POST HYSTEROSCOPY: ICD-10-CM

## 2024-12-24 DIAGNOSIS — R10.2 PELVIC PAIN IN FEMALE: ICD-10-CM

## 2024-12-24 PROCEDURE — 99213 OFFICE O/P EST LOW 20 MIN: CPT | Performed by: ORTHOPAEDIC SURGERY

## 2024-12-24 RX ORDER — ACETAMINOPHEN 325 MG/1
TABLET ORAL
Qty: 50 TABLET | Refills: 0 | OUTPATIENT
Start: 2024-12-24

## 2024-12-24 ASSESSMENT — KOOS JR
HOW SEVERE IS YOUR KNEE STIFFNESS AFTER FIRST WAKING IN MORNING: SEVERE
RISING FROM SITTING: SEVERE
STANDING UPRIGHT: SEVERE
GOING UP OR DOWN STAIRS: EXTREME
BENDING TO THE FLOOR TO PICK UP OBJECT: EXTREME
TWISING OR PIVOTING ON KNEE: EXTREME
KOOS JR SCORING: 20.94
STRAIGHTENING KNEE FULLY: EXTREME

## 2024-12-24 NOTE — TELEPHONE ENCOUNTER
ketorolac (TORADOL) 10 MG tablet       Last Written Prescription Date:  6/19/24  Last Fill Quantity: 20,   # refills: 1  Last Office Visit: 11/18/24  Future Office visit:  none with provider      Next 5 appointments (look out 90 days)      Feb 03, 2025 3:00 PM  (Arrive by 2:45 PM)  Nurse Only with [facility]  [facility] [address]  [address]  [facility] [id number] [phone]             Routing refill request to provider for review/approval because:  Drug not on the [facility], [facility] or [facility] refill protocol or controlled substance    Julia BEARD RN  [facility] [place]
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
## 2024-12-24 NOTE — TELEPHONE ENCOUNTER
Requested Prescriptions   Pending Prescriptions Disp Refills    ketorolac (TORADOL) 10 MG tablet [Pharmacy Med Name: KETOROLAC 10MG TABLETS] 20 tablet 1     Sig: TAKE 1 TABLET(10 MG) BY MOUTH EVERY 6 HOURS AS NEEDED FOR MODERATE PAIN       There is no refill protocol information for this order          Takes this for pelvic pain, okay to fill?    Marysol ORDAZ RN BSN  [facility]

## 2024-12-24 NOTE — TELEPHONE ENCOUNTER
Patient calling to see why refill denied.  Discussed refills requested by pharmacy and by [facility] so duplicate requests will be denied.  Discussed if calls pharmacy does not need to request in mychart.  Discussed refill process of allowing up to 3 business days for refill to be processed.  Patient agrees with plan.  Discussed best to call pharmacy for refills as if they have refills on file they will fill and if they do not have remaining refills they will contact up.  Cierra Gao RN

## 2024-12-24 NOTE — PATIENT INSTRUCTIONS
Minimally displaced lateral facet patella fracture  Recommend nonsurgical management  Weight-bear as tolerated  Range of motion as tolerated  No brace required  Utilize caution as ascending and descending stairs will be more painful due to increased pressure across the kneecap  May add one of the following topical pain relievers to pain regimen:  Voltaren gel or equivalent  Salonpas patches or equivalent  Biofreeze or equivalent  Return in 4 to 6 weeks if needed based on symptoms  If pain and function continue to improve may follow-up on as-needed basis
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
## 2024-12-25 RX ORDER — ACETAMINOPHEN 325 MG/1
975 TABLET ORAL EVERY 8 HOURS PRN
Qty: 90 TABLET | Refills: 0 | Status: SHIPPED | OUTPATIENT
Start: 2024-12-25

## 2024-12-26 RX ORDER — IBUPROFEN 800 MG/1
800 TABLET, FILM COATED ORAL EVERY 6 HOURS PRN
Qty: 30 TABLET | Refills: 0 | Status: SHIPPED | OUTPATIENT
Start: 2024-12-26

## 2024-12-28 RX ORDER — KETOROLAC TROMETHAMINE 10 MG/1
TABLET, FILM COATED ORAL
Qty: 20 TABLET | Refills: 1 | Status: SHIPPED | OUTPATIENT
Start: 2024-12-28

## 2024-12-28 RX ORDER — KETOROLAC TROMETHAMINE 10 MG/1
TABLET, FILM COATED ORAL
Qty: 20 TABLET | Refills: 1 | OUTPATIENT
Start: 2024-12-28

## 2025-01-17 ENCOUNTER — TELEPHONE (OUTPATIENT)
Dept: OBGYN | Facility: CLINIC | Age: 50
End: 2025-01-17
Payer: MEDICARE

## 2025-01-17 DIAGNOSIS — R10.2 PELVIC PAIN IN FEMALE: ICD-10-CM

## 2025-01-17 NOTE — TELEPHONE ENCOUNTER
Lynsey-    This pt is specifically asking to speak with you and meet with you in person to fill out a 2nd level appeal for the removal of her IUD.    Pt states she has the form. I asked if she would send it to us via [employer] and the pt states she would prefer to have an appointment with you to go over filling out the sheet together.     I did advise pt that Dr Sen is out of clinic for next couple weeks.     Pt would like a call back.    Julia BEARD RN  [facility] [place]

## 2025-01-17 NOTE — TELEPHONE ENCOUNTER
Ketorolac 10mg TABS       Last Written Prescription Date:  12/28/24  Last Fill Quantity: 20,   # refills: 1  Last Office Visit: 11/18/24  Future Office visit:    Next 5 appointments (look out 90 days)      Feb 03, 2025 3:00 PM  (Arrive by 2:45 PM)  Nurse Only with [facility]  [facility] ([facility] ) [address]  [address]  [facility] [id number] [phone]             Routing refill request to provider for review/approval because:  Drug not on the [facility], [facility] or [facility] refill protocol or controlled substance    Yaneth Song RN  [facility]

## 2025-01-20 NOTE — TELEPHONE ENCOUNTER
Patient called back. Patient has a form and needs a letter of medical necessity written for the IUD removal from last May that Medicare is not covering.    Pt has a nurse only Depo injection appt on Feb. 3, 2025 at 2:45pm. I told pt we will move that appt to Dr. Sen clinic schedule so we can complete the form and Dr. Sen can write the letter with patient input. Pt needs an  which will be provided since she has an appt on Dr. Sen schedule.    Pt agreeable to plan. Nurse only appt cancelled on 2/3/25. Patient added to Dr. Camacho's schedule on 2/3/25 at 2:45pm for form/letter and get her depo injection.    Lynsey Starks CMA

## 2025-01-29 RX ORDER — KETOROLAC TROMETHAMINE 10 MG/1
TABLET, FILM COATED ORAL
Qty: 20 TABLET | Refills: 1 | Status: SHIPPED | OUTPATIENT
Start: 2025-01-29

## 2025-02-03 ENCOUNTER — ALLIED HEALTH/NURSE VISIT (OUTPATIENT)
Dept: OBGYN | Facility: CLINIC | Age: 50
End: 2025-02-03
Payer: MEDICARE

## 2025-02-03 DIAGNOSIS — R10.2 PELVIC PAIN IN FEMALE: ICD-10-CM

## 2025-02-03 DIAGNOSIS — Z30.42 ENCOUNTER FOR DEPO-PROVERA CONTRACEPTION: Primary | ICD-10-CM

## 2025-02-03 PROCEDURE — 99207 PR NO CHARGE NURSE ONLY: CPT

## 2025-02-03 PROCEDURE — 96372 THER/PROPH/DIAG INJ SC/IM: CPT | Performed by: FAMILY MEDICINE

## 2025-02-03 RX ORDER — KETOROLAC TROMETHAMINE 10 MG/1
10 TABLET, FILM COATED ORAL EVERY 6 HOURS PRN
Qty: 20 TABLET | Refills: 1 | OUTPATIENT
Start: 2025-02-03

## 2025-02-03 RX ADMIN — MEDROXYPROGESTERONE ACETATE 150 MG: 150 INJECTION, SUSPENSION INTRAMUSCULAR at 14:03

## 2025-02-03 NOTE — PROGRESS NOTES
Clinic Administered Medication Documentation      Depo Provera Documentation    Depo-Provera Standing Order inclusion/exclusion criteria reviewed.     Is this the initial or subsequent dose of Depo Provera? Subsequent dose - patient is within the acceptable window of time (11-15 weeks) for subsequent injection. Pregnancy test not indicated.    Patient meets: inclusion criteria     Is there an active order (written within the past 365 days, with administrations remaining, not ) in the chart? Yes.     Prior to injection, verified patient identity using patient's name and date of birth. Medication was administered. Please see MAR and medication order for additional information.     Vial/Syringe: Single dose vial. Was entire vial of medication used? Yes    Patient instructed to remain in clinic for 15 minutes and report any adverse reaction to staff immediately.  NEXT INJECTION DUE: 25 - 25    Verified that the patient has refills remaining in their prescription.      Pt came in today originally for appt with [facility] but provider was not available due to emergency surgery. Was given Depo today. She let me know that she called the pharmacy about her Toradol and was told that they never received it. I also called the pharmacy and they also said they never rec'd it. Pended a whole new one and will have Dr. Sen sign in when she get back from surgery.    Velvet Uribe CMA on 2/3/2025 at 1:58 PM

## 2025-02-10 ENCOUNTER — OFFICE VISIT (OUTPATIENT)
Dept: OBGYN | Facility: CLINIC | Age: 50
End: 2025-02-10
Payer: MEDICARE

## 2025-02-10 VITALS — SYSTOLIC BLOOD PRESSURE: 118 MMHG | DIASTOLIC BLOOD PRESSURE: 68 MMHG | BODY MASS INDEX: 23.34 KG/M2 | WEIGHT: 136 LBS

## 2025-02-10 DIAGNOSIS — N80.9 ENDOMETRIOSIS: ICD-10-CM

## 2025-02-10 DIAGNOSIS — R10.2 PELVIC PAIN IN FEMALE: Primary | ICD-10-CM

## 2025-02-10 PROCEDURE — 99212 OFFICE O/P EST SF 10 MIN: CPT | Performed by: FAMILY MEDICINE

## 2025-02-10 PROCEDURE — G2211 COMPLEX E/M VISIT ADD ON: HCPCS | Performed by: FAMILY MEDICINE

## 2025-02-10 NOTE — NURSING NOTE
"Chief Complaint   Patient presents with    Consult     Medicare form and letter     initial /68   Wt 61.7 kg (136 lb)   LMP  (LMP Unknown)   BMI 23.34 kg/m   Estimated body mass index is 23.34 kg/m  as calculated from the following:    Height as of 12/24/24: 1.626 m (5' 4\").    Weight as of this encounter: 61.7 kg (136 lb).  BP completed using cuff size regular    Lynsey Starks CMA on 2/10/2025 at 10:45 AM    "

## 2025-02-10 NOTE — PROGRESS NOTES
SUBJECTIVE:  Sammie Ramirez is an 49 year old   woman who presents for   Gyn follow-up exam. She was previously seen for endometriosis, IUD removal needing documentation   Of medical necessity for removal, the coverage was denied and she is appealing this decision, currently   Her endometriosis is treated with with the depo-provera injection.    Past Medical History:   Diagnosis Date    ASCUS (atypical squamous cells of undetermined significance) on Pap smear 10/13/2016    Neg HPV    History of colposcopy 2018    LSIL (low grade squamous intraepithelial lesion) on Pap smear 2014          Family History   Problem Relation Age of Onset    Breast Cancer Mother     Family History Negative Mother     Family History Negative Father     Prostate Cancer Father     Liver Cancer Father     Breast Cancer Maternal Grandmother     Myocardial Infarction Paternal Grandmother     Myocardial Infarction Paternal Grandfather        Past Surgical History:   Procedure Laterality Date    COLPOSCOPY, BIOPSY, COMBINED N/A 2024    Procedure: COLPOSCOPY;  Surgeon: Krystin Sen DO;  Location: [facility] OR    DAVINCI PELVIC PROCEDURE Right 2022    Procedure: Xi Robotic  endometriosis resection/fulguration, cervical biopsy, and endometrial biopsy;  Surgeon: Krystin Sen DO;  Location: [facility] OR    DILATION AND CURETTAGE, OPERATIVE HYSTEROSCOPY WITH MORCELLATOR, COMBINED N/A 2024    Procedure: Hysteroscopy, dilation and curettage, polypectomy with myosure, mirena intrauterine device insertion;  Surgeon: Krystin Sen DO;  Location: [facility] OR    INSERT INTRAUTERINE DEVICE N/A 2024    Procedure: Insert intrauterine device;  Surgeon: Krystin Sen DO;  Location: [facility] OR    LEEP TX, CERVICAL  x 2 per chart    historical    ORTHOPEDIC SURGERY         Current Outpatient Medications   Medication Sig Dispense Refill    acetaminophen (TYLENOL) 325 MG tablet Take 3 tablets (975 mg) by  mouth every 8 hours as needed for mild pain. 90 tablet 0    bisacodyl (DULCOLAX) 5 MG EC tablet Two days prior to exam take two (2) tablets at 4pm. One day prior to exam take two (2) tablets at 4pm 4 tablet 0    fluconazole (DIFLUCAN) 150 MG tablet Take 1 tablet (150 mg) by mouth See Admin Instructions. 3 tablet 0    hydrocortisone 2.5 % cream Apply topically 2 times daily as needed for itching or other (hemorrhoids) 30 g 3    hydrocortisone, Perianal, (HYDROCORTISONE) 2.5 % cream Place rectally 2 times daily as needed for hemorrhoids 30 g 3    hydrOXYzine (VISTARIL) 50 MG capsule Take 1 tablet every 6 hours as needed for itching 12 capsule 0    hydrOXYzine HCl (ATARAX) 25 MG tablet Take 1 tablet (25 mg) by mouth 3 times daily as needed for itching 30 tablet 0    ibuprofen (ADVIL/MOTRIN) 800 MG tablet Take 1 tablet (800 mg) by mouth every 6 hours as needed for other (mild and/or inflammatory pain). 30 tablet 0    ketorolac (TORADOL) 10 MG tablet TAKE 1 TABLET(10 MG) BY MOUTH EVERY 6 HOURS AS NEEDED FOR MODERATE PAIN 20 tablet 1    levocetirizine (XYZAL) 5 MG tablet Take 1 tablet (5 mg) by mouth every evening 30 tablet 1    mometasone (ELOCON) 0.1 % external ointment Apply topically daily For up to 14 days 45 g 0    mometasone (NASONEX) 50 MCG/ACT nasal spray Spray 2 sprays into both nostrils daily 17 g 1    mupirocin (BACTROBAN) 2 % external ointment APPLY TOPICALLY TO THE AFFECTED AREA THREE TIMES DAILY 30 g 0    omeprazole (PRILOSEC) 40 MG DR capsule TAKE 1 CAPSULE(40 MG) BY MOUTH DAILY 90 capsule 3    ondansetron (ZOFRAN) 8 MG tablet TAKE 1 TABLET(8 MG) BY MOUTH EVERY 6-8 HOURS AS NEEDED FOR NAUSEA 20 tablet 1    polyethylene glycol (MIRALAX) 17 GM/Dose powder Take 17 g (1 capful) by mouth daily 500 g 0    rizatriptan (MAXALT) 10 MG tablet Take 1 tablet (10 mg) by mouth at onset of headache for migraine May repeat in 2 hours. Max 3 tablets/24 hours. 18 tablet 1    senna-docusate (SENOKOT-S/PERICOLACE) 8.6-50 MG  tablet Take 1-2 tablets by mouth 2 times daily 30 tablet 3    SUMAtriptan (IMITREX) 100 MG tablet Take 1 tablet (100 mg) by mouth at onset of headache for migraine that occur at night.  Take rizatriptan for migraines during the day. 9 tablet 3    triamcinolone (KENALOG) 0.1 % external cream APPLY SPARINGLY TOPICALLY TO THE AFFECTED AREA THREE TIMES DAILY FOR 14 DAYS 80 g 2     No current facility-administered medications for this visit.     Allergies   Allergen Reactions    Amoxicillin      Rash, hives    Co-Trimoxazole [Sulfamethoxazole-Trimethoprim] Other (See Comments)     Yeast infection when taking this medication    Codeine Sulfate Nausea and Vomiting    Hydrocodone Nausea and Vomiting    Hydromorphone Itching     Light reaction, not severe    Lactose Diarrhea     dairy    Oxycodone Hives and Itching    Scopolamine Headache     Severe headache    Seasonal Allergies     Tramadol Headache and Nausea     Unsure if reaction was due to medication or COVID vaccine       Social History     Tobacco Use    Smoking status: Never    Smokeless tobacco: Never   Substance Use Topics    Alcohol use: Yes     Comment: wine 1-2 glasses on weekends       Review Of Systems  Ears/Nose/Throat: negative  Respiratory: No shortness of breath, dyspnea on exertion, cough, or hemoptysis  Cardiovascular: negative  Gastrointestinal: negative  Genitourinary: negative  Constitutional, HEENT, cardiovascular, pulmonary, GI, , musculoskeletal, neuro, skin, endocrine and psych systems are negative, except as otherwise noted.      OBJECTIVE:  /68   Wt 61.7 kg (136 lb)   LMP  (LMP Unknown)   BMI 23.34 kg/m    General appearance: healthy, alert, and no distress    ASSESSMENT:  Sammie Ramirez is an 49 year old   woman who presents for   Gyn follow-up exam. She was previously seen for endometriosis, IUD removal needing documentation   Of medical necessity for removal, the coverage was denied and she is appealing this decision,  currently   Her endometriosis is treated with with the depo-provera injection.    PLAN:  Dx: Endometriosis, insurance coverage issues  1)  Endometriosis:  treated in the past with Oral Contraceptive, IUD and had side effects  Now treated with depo-provera injection.   2)  Medicare denial of services:  appeal letter written, IUD was needed to be removed   Due to medical necessity.  Letter written.         Dr. Krystin Sen, DO    OB/GYN   [facility]

## 2025-02-10 NOTE — PATIENT INSTRUCTIONS
Return as scheduled!    Dr. Krystin Sen, DO    Obstetrics and Gynecology  [facility]

## 2025-02-10 NOTE — LETTER
February 10, 2025      Sammie Ramirez  [address] [address]        To Whom It May Concern,     To Whom It May Concern,      The above patient has been on a complicated treatment plan for pelvic pain and endometriosis.  She tried a few different Oral Contraceptives, which had side effects of nausea, headaches.  She then tried Mirena IUD for pelvic pain and endometriosis.  The Mirena IUD caused daily headaches and needed to be removed for medical necessity and was removed on 5/20/2024 for this reason. Her headaches then resolved.  She is now treated with the depo-provera injection and has no current side effects.  Please cover the cost of the IUD removal secondary to daily headaches and was medically necessary.            Sincerely,            Krystin Sen, DO    Electronically signed

## 2025-02-11 ENCOUNTER — TELEPHONE (OUTPATIENT)
Dept: OBGYN | Facility: CLINIC | Age: 50
End: 2025-02-11
Payer: MEDICARE

## 2025-02-11 DIAGNOSIS — R10.2 PELVIC PAIN IN FEMALE: Primary | ICD-10-CM

## 2025-02-11 NOTE — TELEPHONE ENCOUNTER
Mary notified us via fax that pts insurance does not cover Ketorolac.   The preferred alternative:  Ibuprofen  Naproxen   Meloxicam  Diclofenac    Last OV 2/10/25.    Sakshi LANTIGUA RN  [facility]

## 2025-02-17 NOTE — TELEPHONE ENCOUNTER
Can you find out if the patient has a preference?   Dr. Krystin Sen, DO    Obstetrics and Gynecology  [facility]

## 2025-02-18 ENCOUNTER — MYC MEDICAL ADVICE (OUTPATIENT)
Dept: OBGYN | Facility: CLINIC | Age: 50
End: 2025-02-18
Payer: MEDICARE

## 2025-02-19 RX ORDER — DICLOFENAC SODIUM 75 MG/1
75 TABLET, DELAYED RELEASE ORAL 2 TIMES DAILY
Qty: 30 TABLET | Refills: 4 | Status: SHIPPED | OUTPATIENT
Start: 2025-02-19

## 2025-02-19 NOTE — TELEPHONE ENCOUNTER
Krystin Sen, DO  You; [facility] TriageJust now (4:35 PM)       Called in! Please notify

## 2025-03-30 DIAGNOSIS — Z00.00 ENCOUNTER FOR PREVENTATIVE ADULT HEALTH CARE EXAMINATION: ICD-10-CM

## 2025-03-31 RX ORDER — OMEPRAZOLE 40 MG/1
40 CAPSULE, DELAYED RELEASE ORAL DAILY
Qty: 90 CAPSULE | Refills: 3 | Status: SHIPPED | OUTPATIENT
Start: 2025-03-31

## 2025-04-21 ENCOUNTER — OFFICE VISIT (OUTPATIENT)
Dept: FAMILY MEDICINE | Facility: CLINIC | Age: 50
End: 2025-04-21
Payer: MEDICARE

## 2025-04-21 VITALS
OXYGEN SATURATION: 99 % | WEIGHT: 139 LBS | DIASTOLIC BLOOD PRESSURE: 78 MMHG | HEIGHT: 65 IN | RESPIRATION RATE: 12 BRPM | SYSTOLIC BLOOD PRESSURE: 148 MMHG | HEART RATE: 70 BPM | BODY MASS INDEX: 23.16 KG/M2 | TEMPERATURE: 98.5 F

## 2025-04-21 DIAGNOSIS — N80.9 ENDOMETRIOSIS: Primary | ICD-10-CM

## 2025-04-21 DIAGNOSIS — Z12.31 VISIT FOR SCREENING MAMMOGRAM: ICD-10-CM

## 2025-04-21 DIAGNOSIS — J30.2 SEASONAL ALLERGIC RHINITIS, UNSPECIFIED TRIGGER: ICD-10-CM

## 2025-04-21 DIAGNOSIS — Z30.42 DEPO-PROVERA CONTRACEPTIVE STATUS: ICD-10-CM

## 2025-04-21 DIAGNOSIS — Z98.890 STATUS POST HYSTEROSCOPY: ICD-10-CM

## 2025-04-21 PROCEDURE — 3077F SYST BP >= 140 MM HG: CPT | Performed by: FAMILY MEDICINE

## 2025-04-21 PROCEDURE — 3078F DIAST BP <80 MM HG: CPT | Performed by: FAMILY MEDICINE

## 2025-04-21 PROCEDURE — G2211 COMPLEX E/M VISIT ADD ON: HCPCS | Performed by: FAMILY MEDICINE

## 2025-04-21 PROCEDURE — 96372 THER/PROPH/DIAG INJ SC/IM: CPT | Performed by: FAMILY MEDICINE

## 2025-04-21 PROCEDURE — 99213 OFFICE O/P EST LOW 20 MIN: CPT | Mod: 25 | Performed by: FAMILY MEDICINE

## 2025-04-21 PROCEDURE — T1013 SIGN LANG/ORAL INTERPRETER: HCPCS | Mod: U3

## 2025-04-21 RX ORDER — ACETAMINOPHEN 325 MG/1
975 TABLET ORAL EVERY 8 HOURS PRN
Qty: 200 TABLET | Refills: 3 | Status: SHIPPED | OUTPATIENT
Start: 2025-04-21

## 2025-04-21 RX ORDER — LEVOCETIRIZINE DIHYDROCHLORIDE 5 MG/1
5 TABLET, FILM COATED ORAL EVERY EVENING
Qty: 90 TABLET | Refills: 3 | Status: SHIPPED | OUTPATIENT
Start: 2025-04-21

## 2025-04-21 RX ORDER — MEDROXYPROGESTERONE ACETATE 150 MG/ML
150 INJECTION, SUSPENSION INTRAMUSCULAR
Status: ACTIVE | OUTPATIENT
Start: 2025-04-21

## 2025-04-21 RX ADMIN — MEDROXYPROGESTERONE ACETATE 150 MG: 150 INJECTION, SUSPENSION INTRAMUSCULAR at 16:30

## 2025-04-21 NOTE — PROGRESS NOTES
Assessment & Plan     Endometriosis  Depo-Provera contraceptive status  Orders placed today, okayed per OBGYN in last visit note.  - medroxyPROGESTERone (DEPO-PROVERA) injection 150 mg    Status post hysteroscopy  Refill for PRN use  - acetaminophen (TYLENOL) 325 MG tablet; Take 3 tablets (975 mg) by mouth every 8 hours as needed for mild pain.    Seasonal allergic rhinitis, unspecified trigger  Refill for PRN use  - levocetirizine (XYZAL) 5 MG tablet; Take 1 tablet (5 mg) by mouth every evening.    Visit for screening mammogram  - MA Screening Bilateral w/ Osman; Future      The longitudinal plan of care for the diagnosis(es)/condition(s) as documented were addressed during this visit. Due to the added complexity in care, I will continue to support Sammie in the subsequent management and with ongoing continuity of care.        Jim Cochran is a 49 year old, presenting for the following health issues:  Recheck Medication        4/21/2025     3:54 PM   Additional Questions   Roomed by Hailey MONTGOMERY        Medication Followup of Depo   Taking Medication as prescribed: yes  Side Effects:  None  Medication Helping Symptoms:  yes    Taking for her Endometriosis, last given 2-3-25, due 4-21-25 to 5-19-21.    Takes Rizatriptan in day, Sumatriptan in evening when needed.    Needs medications refilled.    Current Outpatient Medications   Medication Sig Dispense Refill    acetaminophen (TYLENOL) 325 MG tablet Take 3 tablets (975 mg) by mouth every 8 hours as needed for mild pain. 200 tablet 3    bisacodyl (DULCOLAX) 5 MG EC tablet Two days prior to exam take two (2) tablets at 4pm. One day prior to exam take two (2) tablets at 4pm 4 tablet 0    diclofenac (VOLTAREN) 75 MG EC tablet Take 1 tablet (75 mg) by mouth 2 times daily. 30 tablet 4    hydrocortisone 2.5 % cream Apply topically 2 times daily as needed for itching or other (hemorrhoids) 30 g 3    hydrocortisone, Perianal, (HYDROCORTISONE) 2.5 % cream Place rectally  "2 times daily as needed for hemorrhoids 30 g 3    hydrOXYzine (VISTARIL) 50 MG capsule Take 1 tablet every 6 hours as needed for itching 12 capsule 0    hydrOXYzine HCl (ATARAX) 25 MG tablet Take 1 tablet (25 mg) by mouth 3 times daily as needed for itching 30 tablet 0    ibuprofen (ADVIL/MOTRIN) 800 MG tablet Take 1 tablet (800 mg) by mouth every 6 hours as needed for other (mild and/or inflammatory pain). 30 tablet 0    levocetirizine (XYZAL) 5 MG tablet Take 1 tablet (5 mg) by mouth every evening. 90 tablet 3    mometasone (ELOCON) 0.1 % external ointment Apply topically daily For up to 14 days 45 g 0    mometasone (NASONEX) 50 MCG/ACT nasal spray Spray 2 sprays into both nostrils daily 17 g 1    omeprazole (PRILOSEC) 40 MG DR capsule Take 1 capsule (40 mg) by mouth daily. 90 capsule 3    ondansetron (ZOFRAN) 8 MG tablet TAKE 1 TABLET(8 MG) BY MOUTH EVERY 6-8 HOURS AS NEEDED FOR NAUSEA 20 tablet 1    polyethylene glycol (MIRALAX) 17 GM/Dose powder Take 17 g (1 capful) by mouth daily 500 g 0    rizatriptan (MAXALT) 10 MG tablet Take 1 tablet (10 mg) by mouth at onset of headache for migraine May repeat in 2 hours. Max 3 tablets/24 hours. 18 tablet 1    senna-docusate (SENOKOT-S/PERICOLACE) 8.6-50 MG tablet Take 1-2 tablets by mouth 2 times daily 30 tablet 3    SUMAtriptan (IMITREX) 100 MG tablet Take 1 tablet (100 mg) by mouth at onset of headache for migraine that occur at night.  Take rizatriptan for migraines during the day. 9 tablet 3    triamcinolone (KENALOG) 0.1 % external cream APPLY SPARINGLY TOPICALLY TO THE AFFECTED AREA THREE TIMES DAILY FOR 14 DAYS 80 g 2           Objective    BP (!) 148/78 (BP Location: Right arm, Patient Position: Chair, Cuff Size: Adult Regular)   Pulse 70   Temp 98.5  F (36.9  C) (Oral)   Resp 12   Ht 1.651 m (5' 5\")   Wt 63 kg (139 lb)   SpO2 99%   BMI 23.13 kg/m    Body mass index is 23.13 kg/m .  Physical Exam   GENERAL: alert and no distress  PSYCH: mentation appears " normal, affect normal/bright          Signed Electronically by: Jocy Gimenez MD

## 2025-04-21 NOTE — PROGRESS NOTES

## 2025-04-22 ENCOUNTER — PATIENT OUTREACH (OUTPATIENT)
Dept: CARE COORDINATION | Facility: CLINIC | Age: 50
End: 2025-04-22
Payer: MEDICARE

## 2025-04-22 ENCOUNTER — TELEPHONE (OUTPATIENT)
Dept: OBGYN | Facility: CLINIC | Age: 50
End: 2025-04-22
Payer: MEDICARE

## 2025-04-22 NOTE — TELEPHONE ENCOUNTER
Spoke with the pt via . Pt states that her insurance is not covering her IUD removal that was done on 5/20/24.    She is wanting this message routed to Dr. Sen and her team.     She is wondering if she should have another appt to discuss.    ZEINA [facility]

## 2025-04-22 NOTE — TELEPHONE ENCOUNTER
[facility] Call Center    Phone Message    May a detailed message be left on voicemail: yes     Reason for Call: Patient is calling to discuss the medication rejection she received from her insurance. Please reach out when able. Thank you    Action Taken: Message routed to:  Other: [facility]    Travel Screening: Not Applicable     Date of Service:

## 2025-04-23 NOTE — TELEPHONE ENCOUNTER
Call to pts insurance but office was closed-    Unable to leave message.     Will attempt to callback during business hours 8am-4pm.     Called providers assistance line listed on letter of denial appeal- [phone]    Yaneth Song RN  [facility]

## 2025-04-23 NOTE — TELEPHONE ENCOUNTER
Is someone able to reach out to insurance to see why not covered/   Dr. Krystin Sen, DO    Obstetrics and Gynecology  [facility]

## 2025-04-24 NOTE — TELEPHONE ENCOUNTER
Tried to contact medicare again and was unable to connect with agent who could give me more information.     ZEINA Pineda

## 2025-04-24 NOTE — TELEPHONE ENCOUNTER
[facility]    Phone Message    May a detailed message be left on voicemail: yes     Reason for Call: Other: Pt returning call to clinic to speak with Hailey. I believe her intent was to reach Sakshi or the RN previously working on this matter. Pt seemed upset and would like to speak with RN 'who knows what's actually going on'. Please review and contact pt when able.     Action Taken: Other: [facility]     Travel Screening: Not Applicable     Date of Service:

## 2025-04-29 ENCOUNTER — TELEPHONE (OUTPATIENT)
Dept: OBGYN | Facility: CLINIC | Age: 50
End: 2025-04-29
Payer: MEDICARE

## 2025-04-29 NOTE — TELEPHONE ENCOUNTER
I called pt. Pt is scheduled 5/1/2025 at 4:15pm to discuss appeal letter she received.    Lynsey Starks CMA

## 2025-04-29 NOTE — TELEPHONE ENCOUNTER
[facility] Call [facility]    Phone Message    May a detailed message be left on voicemail: yes     Reason for Call: Pt called in via  stating she would like to speak with Hailey or Dr. Camacho directly. Pt stated she received an appeal letter but does not want to discuss the details of this letter with anyone except Hailey or Dr. Camacho.     Writer asked if she can provider brief details about her concerns and pt refused and was a bit upset.     Pt can be reached at [phone]    Action Taken: Other: [facility]    Travel Screening: Not Applicable

## 2025-04-29 NOTE — TELEPHONE ENCOUNTER
I called pt. Pt is scheduled 5/1/2025 with Dr. Sen to discuss appeal letter.    Lynsey Starks CMA

## 2025-05-01 ENCOUNTER — OFFICE VISIT (OUTPATIENT)
Dept: OBGYN | Facility: CLINIC | Age: 50
End: 2025-05-01
Payer: MEDICARE

## 2025-05-01 VITALS
BODY MASS INDEX: 22.91 KG/M2 | HEIGHT: 65 IN | SYSTOLIC BLOOD PRESSURE: 120 MMHG | DIASTOLIC BLOOD PRESSURE: 70 MMHG | WEIGHT: 137.5 LBS

## 2025-05-01 DIAGNOSIS — Z09 FOLLOW-UP EXAM: Primary | ICD-10-CM

## 2025-05-01 PROCEDURE — 3074F SYST BP LT 130 MM HG: CPT | Performed by: FAMILY MEDICINE

## 2025-05-01 PROCEDURE — 99207 PR NO CHARGE LOS: CPT | Performed by: FAMILY MEDICINE

## 2025-05-01 PROCEDURE — 3078F DIAST BP <80 MM HG: CPT | Performed by: FAMILY MEDICINE

## 2025-05-01 NOTE — NURSING NOTE
"Chief Complaint   Patient presents with    Consult     Discuss appeal letter     initial /70   Ht 1.651 m (5' 5\")   Wt 62.4 kg (137 lb 8 oz)   BMI 22.88 kg/m   Estimated body mass index is 22.88 kg/m  as calculated from the following:    Height as of this encounter: 1.651 m (5' 5\").    Weight as of this encounter: 62.4 kg (137 lb 8 oz).  BP completed using cuff size regular    Lynsey Starks CMA on 5/1/2025 at 10:13 AM    "

## 2025-05-01 NOTE — LETTER
May 1, 2025      Sammie Ramirez  [address] [address]        To Whom It May Concern,     I, Sammie Ramirez, name Dr. Krystin Sen my representative to file an appeal on my behalf.     Sammie Sen           Sincerely,           Sammie Ramirez        Sincerely,        Krystin Sen, DO    Electronically signed

## 2025-05-01 NOTE — LETTER
May 19, 2025      Sammie Ramirez  [address] [address]        To Whom It May Concern,     The above patient has been on a complicated treatment plan for pelvic pain and endometriosis. Over the course of time, she tried a few different Oral Contraceptives, which had side effects of nausea, headaches. She then tried Mirena IUD for pelvic pain and endometriosis. The Mirena IUD caused daily headaches and needed to be removed for medical necessity and was removed on 5/20/2024 for this reason. Her headaches then resolved. She is now treated with the depo-provera injection and has no current side effects. Please cover the cost of the IUD removal secondary to daily headaches and was medically necessary. It is unclear to me why the placement of an IUD was covered, but the removal of the device was not covered. Please consider the coverage of the complete treatment of the IUD as this is part of the overall condition when we place a device.      Please do not hesitate to call me to explain why the removal was not covered at [phone].  I would strongly recommend coverage as the non-coverage of the treatment with the IUD is causing distress and financial hardship for a patient that has struggled to treat her endomtriosis.  You will note the usual course of treatment for endometriosis is surgery for endometriosis excision and also at times hysterectomy,  and that treatment would be a higher cost to yourselves and the state.  I would urge you to consider covering this in this instance as we were able to avoid surgery at this time. Thank you for your consideration.       Sincerely,        Krystin Sen, DO    Electronically signed

## 2025-05-01 NOTE — PROGRESS NOTES
"S:  The purpose of the visit is to set up an appeal for the patient regarding payment.     She signed a letter designating myself to be able to file an apeal.      O: /70   Ht 1.651 m (5' 5\")   Wt 62.4 kg (137 lb 8 oz)   BMI 22.88 kg/m     Assessment:  48 y/o  on depo-provera for endometriosis suppression   Plan:  continue depo-provera injection.    She had the IUD removed for daily migraine, back pain and medicare hasn't covered   The IUD removal, although they covered the IUD placement.      Letter written and patient signed   Will work on appeal letter     *** minutes spent on the date of the encounter doing { E&M time in:988710}          Dr. Krystin Sen, DO    Obstetrics and Gynecology  [facility]       "

## 2025-05-08 ENCOUNTER — OFFICE VISIT (OUTPATIENT)
Dept: FAMILY MEDICINE | Facility: CLINIC | Age: 50
End: 2025-05-08
Payer: MEDICARE

## 2025-05-08 VITALS
WEIGHT: 137.9 LBS | RESPIRATION RATE: 17 BRPM | TEMPERATURE: 99 F | BODY MASS INDEX: 22.98 KG/M2 | HEIGHT: 65 IN | HEART RATE: 61 BPM | OXYGEN SATURATION: 100 % | SYSTOLIC BLOOD PRESSURE: 146 MMHG | DIASTOLIC BLOOD PRESSURE: 79 MMHG

## 2025-05-08 DIAGNOSIS — M79.644 PAIN OF RIGHT THUMB: Primary | ICD-10-CM

## 2025-05-08 NOTE — PATIENT INSTRUCTIONS
Ortho referral for mass in right thumb.    Thumb pain  Ibuprofen (600 mg) every 6-8 hours   Tylenol (1000 mg) three times a day  -please take food with these    Topical Analgesics (Icy Hot, Biofreeze, Voltaren Gel)  -can use up to 3-4 times aday    Ice (inflammation)/ Heat (Muscle relaxing)    Right wrist brace w/ thumb spica to treat for tendonitis to thumb

## 2025-05-08 NOTE — PROGRESS NOTES
"  Assessment & Plan     (M79.644) Pain of right thumb  (primary encounter diagnosis)  Comment: seems to have 2 injuries potentially. Patient has what seems to be a ganglion cyst to right thumb knuckle and some tenosynovitis to her MCP joint area. At this point would recommend conservative measures outlined in patient instructions and referral to ortho to evaluate for ganglion cyst. Follow up if noting worsening symptoms. Patient fully understands and is agreeable with plan of care, at this point patient will follow up as needed unless acute concerns arise in the meantime.  Plan: Orthopedic  Referral      Jim Cochran is a 49 year old, presenting for the following health issues:  Pain (Right thumb)        5/8/2025    10:34 AM   Additional Questions   Roomed by sonu elkins     History of Present Illness       Reason for visit:  Right thumb pain  Symptom onset:  More than a month  Symptoms include:  Continues pain that is getting worse  Symptom intensity:  Moderate  Symptom progression:  Worsening  Had these symptoms before:  No  What makes it worse:  No  What makes it better:  No   She is taking medications regularly.        Pain History:  When did you first notice your pain? Last year   Have you seen this provider for your pain in the past? No   Where in your body do your have pain? Right thumb  Are you seeing anyone else for your pain? No  What makes your pain better? nothing  What makes your pain worse? nothing  How has pain affected your ability to work? 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 Repetitious movements of thumb and hands.   Who lives in your household? self    Review of Systems  Constitutional, musculoskeletal systems are negative, except as otherwise noted.      Objective    BP (!) 146/79 (BP Location: Right arm, Patient Position: Sitting, Cuff Size: Adult Regular)   Pulse 61   Temp 99  F (37.2  C) (Oral)   Resp 17   Ht 1.651 m (5' 5\")   " Wt 62.6 kg (137 lb 14.4 oz)   SpO2 100%   BMI 22.95 kg/m    Body mass index is 22.95 kg/m .  Physical Exam  Vitals and nursing note reviewed.   Constitutional:       Appearance: Normal appearance. She is well-developed. She is not ill-appearing or toxic-appearing.   Cardiovascular:      Rate and Rhythm: Normal rate.   Pulmonary:      Effort: Pulmonary effort is normal.   Musculoskeletal:      Left hand: Normal.      Comments: Right thumb pain, small 1/2 pea sized round, mobile, soft, tender to palpation mass on knuckle. Tenderness to MCP joint w/ palpation.    Neurological:      Mental Status: She is alert.   Psychiatric:         Attention and Perception: Attention and perception normal.         Mood and Affect: Mood normal.         Speech: Speech normal.         Behavior: Behavior normal. Behavior is cooperative.         Thought Content: Thought content normal.         Judgment: Judgment normal.            Signed Electronically by: JORGE Juárez CNP

## 2025-05-24 ENCOUNTER — HEALTH MAINTENANCE LETTER (OUTPATIENT)
Age: 50
End: 2025-05-24

## 2025-06-02 ENCOUNTER — ANCILLARY PROCEDURE (OUTPATIENT)
Dept: GENERAL RADIOLOGY | Facility: CLINIC | Age: 50
End: 2025-06-02
Attending: STUDENT IN AN ORGANIZED HEALTH CARE EDUCATION/TRAINING PROGRAM
Payer: MEDICARE

## 2025-06-02 ENCOUNTER — OFFICE VISIT (OUTPATIENT)
Dept: ORTHOPEDICS | Facility: CLINIC | Age: 50
End: 2025-06-02
Payer: MEDICARE

## 2025-06-02 DIAGNOSIS — M67.441 GANGLION CYST OF FINGER OF RIGHT HAND: Primary | ICD-10-CM

## 2025-06-02 DIAGNOSIS — M79.644 PAIN OF RIGHT THUMB: ICD-10-CM

## 2025-06-02 DIAGNOSIS — M77.8 THUMB TENDONITIS: ICD-10-CM

## 2025-06-02 PROCEDURE — 76942 ECHO GUIDE FOR BIOPSY: CPT | Performed by: STUDENT IN AN ORGANIZED HEALTH CARE EDUCATION/TRAINING PROGRAM

## 2025-06-02 PROCEDURE — 99214 OFFICE O/P EST MOD 30 MIN: CPT | Mod: 25 | Performed by: STUDENT IN AN ORGANIZED HEALTH CARE EDUCATION/TRAINING PROGRAM

## 2025-06-02 PROCEDURE — 73140 X-RAY EXAM OF FINGER(S): CPT | Mod: TC | Performed by: RADIOLOGY

## 2025-06-02 PROCEDURE — 20612 ASPIRATE/INJ GANGLION CYST: CPT | Mod: RT | Performed by: STUDENT IN AN ORGANIZED HEALTH CARE EDUCATION/TRAINING PROGRAM

## 2025-06-02 RX ORDER — LIDOCAINE HYDROCHLORIDE 10 MG/ML
3 INJECTION, SOLUTION INFILTRATION; PERINEURAL
Status: COMPLETED | OUTPATIENT
Start: 2025-06-02 | End: 2025-06-02

## 2025-06-02 RX ADMIN — LIDOCAINE HYDROCHLORIDE 3 ML: 10 INJECTION, SOLUTION INFILTRATION; PERINEURAL at 10:43

## 2025-06-02 NOTE — PROGRESS NOTES
ASSESSMENT & PLAN    Sammie was seen today for pain.    Diagnoses and all orders for this visit:    Ganglion cyst of finger of right hand  -     Hand / Upper Extremity Injection/Arthrocentesis  -     Orthopedic  Referral; Future    Pain of right thumb  -     Orthopedic  Referral  -     XR Finger Right G/E 2 Views; Future  -     Orthopedic  Referral; Future    Thumb tendonitis      This issue is chronic and Worsening. Sammie presents our clinic today to discuss her chronic right thumb pain.  She reports insidious onset of pain over the past year as well as a mass over the IP joint of the right thumb that is painful to touch as well as diffuse pain throughout the thumb with general activities.  She utilizes [employer] in her day-to-day life and also works heavily with her hands.  Radiographs taken in clinic today reviewed the patient and show some mild degenerative changes at the CMC joint, but are otherwise unremarkable.  On examination, she is tender palpation over the ganglion cyst at the IP joint of the thumb.  She also has generalized pain throughout the thumb that is exacerbated by resisted motion testing of the thumb, and these findings are consistent with both her ganglion cyst causing some symptoms as well as generalized thumb tendinitis.  We discussed treatment options for ganglion cyst including aspiration versus surgical excision.  We discussed other treatment options for tendinitis including hand therapy, bracing, anti-inflammatory medicines.  We discussed that given it appears she has pain with multiple different motions of the thumb, there is not 1 good location for a corticosteroid injection to provide adequate relief.  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 for evaluation of right thumb.     The patient is seen with an .  The patient is Right handed    Onset: 1 years(s) ago with pain significantly worsing. Reports insidious onset without acute precipitating event.  Location of Pain: right thumb, bump over IP joint  Worsened by: painful to touch, hitting against hard surface, gripping / grasping, writing  Better with: Tylenol provides temporary relief  Treatments tried: rest/activity avoidance and Tylenol  Associated symptoms: swelling / bump over IP joint    Orthopedic/Surgical history: NO  Social History/Occupation: cleans houses      REVIEW OF SYSTEMS:  Review of systems negative unless mentioned in HPI     OBJECTIVE:  There were no vitals taken for this visit.   General: healthy, alert and in no distress  Skin: no suspicious lesions or rash.  CV: distal perfusion intact   Resp: normal respiratory effort without conversational dyspnea   Psych: normal mood and affect  Gait: NORMAL  Neuro: Normal light sensory exam of RU extremity     Hand Exam    Left  Right   Inspection No atrophy, erythema , echymosis, or deformity  Ganglion cyst IP joint thumb   Palpation         Tenderness over    No tenderness to palpation of radial styloid, DRUJ, TFCC, scaphoid/snuffbox TTP Ganglion cyst   No tenderness to palpation of radial styloid, DRUJ, TFCC, scaphoid/snuffbox   Range of Motion        1st digit IP flex/ext Normal  Normal      DIP flexion/extension  Normal 2nd-5th Normal 2nd-5th      PIP flexion/extension  Normal 2nd-5th Normal 2nd-5th      MCP flexion/extension Normal Normal   Strength      1st digit IP flex/ext Full Full    DIP flexion/extension Full Full    PIP flexion/extension  Full Full    MCP flexion/extension Full Full   Pain with resisted None All motions of thumb   Vascular   Intact  Intact    Special Tests   1st MCP valgus 0/30 normal   Neg CMC grind  No triggering evident  Able to make 'OK' sign (AIN)  Intact resisted abduction of digits    Sensation Intact to median, ulnar, and radial nerve distributions          RADIOLOGY:  Final results and radiologist's interpretation, available in the [facility] health record.  Images were reviewed with the patient in the office today.  My personal interpretation of the performed imaging: Mild degenerative changes at the CMC joint of the thumb.  Otherwise normal joint spacing and alignment.  No acute bony abnormalities    Hand / Upper Extremity Injection/Arthrocentesis    Date/Time: 6/2/2025 10:43 AM    Performed by: Pawan Pedroza DO  Authorized by: Pawan Pedroza DO    Indications:  Pain  Needle Size:  25 G  Guidance: ultrasound    Approach:  Dorsal   Condition comment:  Right thumb dorsal ganglion cyst    Medications:  3 mL lidocaine 1 %  Medications comment:  1ml of 8.4% Sodium Bicarbonate solution was used to buffer the local numbing agent for today's injection    Aspirate amount (mL):  0.1  Aspirate:  Gel like  Outcome:  Tolerated well, no immediate complications  Procedure discussed: discussed risks, benefits, and alternatives    Consent Given by:  Patient  Timeout: timeout called immediately prior to procedure    Prep: patient was prepped and draped in usual sterile fashion     Ultrasound was used to ensure safe and accurate needle placement and injection. Ultrasound images of the procedure were permanently stored.

## 2025-06-02 NOTE — LETTER
6/2/2025      Sammie Ramirez  [address] [address]      Dear Colleague,    Thank you for referring your patient, Sammie Ramirez, to the [facility] [place]. Please see a copy of my visit note below.    ASSESSMENT & PLAN    Sammie was seen today for pain.    Diagnoses and all orders for this visit:    Ganglion cyst of finger of right hand  -     Hand / Upper Extremity Injection/Arthrocentesis  -     Orthopedic  Referral; Future    Pain of right thumb  -     Orthopedic  Referral  -     XR Finger Right G/E 2 Views; Future  -     Orthopedic  Referral; Future    Thumb tendonitis      This issue is chronic and Worsening. Sammie presents our clinic today to discuss her chronic right thumb pain.  She reports insidious onset of pain over the past year as well as a mass over the IP joint of the right thumb that is painful to touch as well as diffuse pain throughout the thumb with general activities.  She utilizes [employer] in her day-to-day life and also works heavily with her hands.  Radiographs taken in clinic today reviewed the patient and show some mild degenerative changes at the CMC joint, but are otherwise unremarkable.  On examination, she is tender palpation over the ganglion cyst at the IP joint of the thumb.  She also has generalized pain throughout the thumb that is exacerbated by resisted motion testing of the thumb, and these findings are consistent with both her ganglion cyst causing some symptoms as well as generalized thumb tendinitis.  We discussed treatment options for ganglion cyst including aspiration versus surgical excision.  We discussed other treatment options for tendinitis including hand therapy, bracing, anti-inflammatory medicines.  We discussed that given it appears she has pain with multiple different motions of the thumb, there is not 1 good location for a corticosteroid injection to provide adequate relief.  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 for evaluation of right thumb.     The patient is seen with an .  The patient is Right handed    Onset: 1 years(s) ago with pain significantly worsing. Reports insidious onset without acute precipitating event.  Location of Pain: right thumb, bump over IP joint  Worsened by: painful to touch, hitting against hard surface, gripping / grasping, writing  Better with: Tylenol provides temporary relief  Treatments tried: rest/activity avoidance and Tylenol  Associated symptoms: swelling / bump over IP joint    Orthopedic/Surgical history: NO  Social History/Occupation: cleans houses      REVIEW OF SYSTEMS:  Review of systems negative unless mentioned in HPI     OBJECTIVE:  There were no vitals taken for this visit.   General: healthy, alert and in no distress  Skin: no suspicious lesions or rash.  CV: distal perfusion intact   Resp: normal respiratory effort without conversational dyspnea   Psych: normal mood and affect  Gait: NORMAL  Neuro: Normal light sensory exam of RU extremity     Hand Exam    Left  Right   Inspection No atrophy, erythema , echymosis, or deformity  Ganglion cyst IP joint thumb   Palpation          Tenderness over    No tenderness to palpation of radial styloid, DRUJ, TFCC, scaphoid/snuffbox TTP Ganglion cyst   No tenderness to palpation of radial styloid, DRUJ, TFCC, scaphoid/snuffbox   Range of Motion        1st digit IP flex/ext Normal Normal      DIP flexion/extension  Normal 2nd-5th Normal 2nd-5th      PIP flexion/extension  Normal 2nd-5th Normal 2nd-5th      MCP flexion/extension Normal Normal   Strength      1st digit IP flex/ext Full Full    DIP flexion/extension Full Full    PIP flexion/extension  Full Full    MCP flexion/extension Full Full   Pain with resisted None All motions of thumb   Vascular   Intact  Intact    Special Tests   1st MCP valgus 0/30 normal   Neg CMC grind  No triggering evident  Able to make 'OK' sign (AIN)  Intact resisted abduction of digits    Sensation Intact to median, ulnar, and radial nerve distributions          RADIOLOGY:  Final results and radiologist's interpretation, available in the [facility] health record.  Images were reviewed with the patient in the office today.  My personal interpretation of the performed imaging: Mild degenerative changes at the CMC joint of the thumb.  Otherwise normal joint spacing and alignment.  No acute bony abnormalities    Hand / Upper Extremity Injection/Arthrocentesis    Date/Time: 6/2/2025 10:43 AM    Performed by: Pawan Pedroza DO  Authorized by: Pawan Pedroza DO    Indications:  Pain  Needle Size:  25 G  Guidance: ultrasound    Approach:  Dorsal   Condition comment:  Right thumb dorsal ganglion cyst    Medications:  3 mL lidocaine 1 %  Medications comment:  1ml of 8.4% Sodium Bicarbonate solution was used to buffer the local numbing agent for today's injection    Aspirate amount (mL):  0.1  Aspirate:  Gel like  Outcome:  Tolerated well, no immediate complications  Procedure discussed: discussed risks, benefits, and alternatives    Consent Given by:  Patient  Timeout: timeout called immediately prior to procedure    Prep: patient  was prepped and draped in usual sterile fashion     Ultrasound was used to ensure safe and accurate needle placement and injection. Ultrasound images of the procedure were permanently stored.            Again, thank you for allowing me to participate in the care of your patient.        Sincerely,        Pawan Pedroza, DO    Electronically signed

## 2025-06-03 ENCOUNTER — PATIENT OUTREACH (OUTPATIENT)
Dept: CARE COORDINATION | Facility: CLINIC | Age: 50
End: 2025-06-03
Payer: MEDICARE

## 2025-06-05 ENCOUNTER — PATIENT OUTREACH (OUTPATIENT)
Dept: CARE COORDINATION | Facility: CLINIC | Age: 50
End: 2025-06-05
Payer: MEDICARE

## 2025-07-14 ENCOUNTER — ANCILLARY PROCEDURE (OUTPATIENT)
Dept: MAMMOGRAPHY | Facility: CLINIC | Age: 50
End: 2025-07-14
Attending: FAMILY MEDICINE
Payer: MEDICARE

## 2025-07-14 ENCOUNTER — ALLIED HEALTH/NURSE VISIT (OUTPATIENT)
Dept: FAMILY MEDICINE | Facility: CLINIC | Age: 50
End: 2025-07-14
Payer: MEDICARE

## 2025-07-14 DIAGNOSIS — Z12.31 VISIT FOR SCREENING MAMMOGRAM: ICD-10-CM

## 2025-07-14 DIAGNOSIS — Z30.42 DEPO-PROVERA CONTRACEPTIVE STATUS: Primary | ICD-10-CM

## 2025-07-14 PROCEDURE — 96372 THER/PROPH/DIAG INJ SC/IM: CPT | Performed by: FAMILY MEDICINE

## 2025-07-14 PROCEDURE — 77063 BREAST TOMOSYNTHESIS BI: CPT | Mod: TC | Performed by: RADIOLOGY

## 2025-07-14 PROCEDURE — 77067 SCR MAMMO BI INCL CAD: CPT | Mod: TC | Performed by: RADIOLOGY

## 2025-07-14 PROCEDURE — 99207 PR NO CHARGE NURSE ONLY: CPT

## 2025-07-14 RX ADMIN — MEDROXYPROGESTERONE ACETATE 150 MG: 150 INJECTION, SUSPENSION INTRAMUSCULAR at 09:37

## 2025-07-14 NOTE — PROGRESS NOTES
Clinic Administered Medication Documentation      Depo Provera Documentation    Depo-Provera Standing Order inclusion/exclusion criteria reviewed.     Is this the initial or subsequent dose of Depo Provera? Subsequent dose - patient is within the acceptable window of time (11-15 weeks) for subsequent injection. Pregnancy test not indicated.    Patient meets: inclusion criteria     Is there an active order (written within the past 365 days, with administrations remaining, not ) in the chart? Yes.     Prior to injection, verified patient identity using patient's name and date of birth. Medication was administered. Please see MAR and medication order for additional information.     Vial/Syringe: Single dose vial. Was entire vial of medication used? Yes    Patient instructed to remain in clinic for 15 minutes and report any adverse reaction to staff immediately.  NEXT INJECTION DUE: 25 - 10/27/25    Verified that the patient has refills remaining in their prescription. Patient will get her next injection at her OB/GYN appt

## 2025-08-25 ENCOUNTER — APPOINTMENT (OUTPATIENT)
Age: 50
Setting detail: DERMATOLOGY
End: 2025-08-25

## 2025-08-25 DIAGNOSIS — Z85.828 PERSONAL HISTORY OF OTHER MALIGNANT NEOPLASM OF SKIN: ICD-10-CM

## 2025-08-25 DIAGNOSIS — L72.0 EPIDERMAL CYST: ICD-10-CM

## 2025-08-25 PROBLEM — C44.311 BASAL CELL CARCINOMA OF SKIN OF NOSE: Status: ACTIVE | Noted: 2025-08-25

## 2025-08-25 PROCEDURE — ? COUNSELING

## 2025-08-25 PROCEDURE — ? OBSERVATION

## 2025-08-25 PROCEDURE — ? DIAGNOSIS COMMENT

## 2025-08-25 PROCEDURE — ? ADDITIONAL NOTES

## 2025-08-25 ASSESSMENT — LOCATION SIMPLE DESCRIPTION DERM
LOCATION SIMPLE: CHEST
LOCATION SIMPLE: ABDOMEN

## 2025-08-25 ASSESSMENT — LOCATION DETAILED DESCRIPTION DERM
LOCATION DETAILED: LOWER STERNUM
LOCATION DETAILED: LEFT RIB CAGE

## 2025-08-25 ASSESSMENT — LOCATION ZONE DERM: LOCATION ZONE: TRUNK

## (undated) DEVICE — PAD CHUX UNDERPAD 30X36" P3036C

## (undated) DEVICE — GLOVE PROTEXIS POWDER FREE SMT 6.0  2D72PT60X

## (undated) DEVICE — LINEN FULL SHEET 5511

## (undated) DEVICE — SYR 30ML LL W/O NDL 302832

## (undated) DEVICE — SU VICRYL 0 TIE 12X18" J906G

## (undated) DEVICE — PREP TECHNI-CARE CHLOROXYLENOL 3% 4OZ BOTTLE C222-4ZWO

## (undated) DEVICE — PACK DAVINCI GYN SMA15GDFS1

## (undated) DEVICE — LINEN HALF SHEET 5512

## (undated) DEVICE — DAVINCI XI DRAPE ARM 470015

## (undated) DEVICE — ESU GROUND PAD ADULT W/CORD E7507

## (undated) DEVICE — DAVINCI XI OBTURATOR BLADELESS 8MM 470359

## (undated) DEVICE — SUCTION MANIFOLD NEPTUNE 2 SYS 4 PORT 0702-020-000

## (undated) DEVICE — SOL NACL 0.9% IRRIG 1000ML BOTTLE 2F7124

## (undated) DEVICE — GLOVE BIOGEL PI MICRO INDICATOR UNDERGLOVE SZ 6.5 48965

## (undated) DEVICE — CATH TRAY FOLEY SURESTEP 16FR DRAIN BAG STATOCK A899916

## (undated) DEVICE — BASIN SET MINOR DISP

## (undated) DEVICE — SOL NACL 0.9% INJ 1000ML BAG 2B1324X

## (undated) DEVICE — APPLICATOR ENDOSCOPIC 5 SURGICEL POWDER 3123SPEA

## (undated) DEVICE — GLOVE PROTEXIS BLUE W/NEU-THERA 6.5  2D73EB65

## (undated) DEVICE — DILATOR PROBE UTERINE OS CANAL FINDER 260-610

## (undated) DEVICE — SOL WATER IRRIG 1000ML BOTTLE 2F7114

## (undated) DEVICE — DAVINCI HOT SHEARS TIP COVER  400180

## (undated) DEVICE — SUCTION CURETTE 3MM ENDOMETRIAL MX140

## (undated) DEVICE — ENDO TROCAR CONMED AIRSEAL BLADELESS 05X120MM IAS5-120LP

## (undated) DEVICE — LINEN DRAPE 54X72" 5467

## (undated) DEVICE — DRAPE UNDER BUTTOCK 89415

## (undated) DEVICE — SURGICEL POWDER ABSORBABLE HEMOSTAT 3GM 3013SP

## (undated) DEVICE — PREP CHLORHEXIDINE 4% 4OZ (HIBICLENS) 57504

## (undated) DEVICE — DEVICE SUTURE GRASPER TROCAR CLOSURE 14GA PMITCSG

## (undated) DEVICE — PAD PERI INDIV WRAP 11" 2022A

## (undated) DEVICE — BAG CLEAR TRASH 1.3M 39X33" P4040C

## (undated) DEVICE — SPECIMEN CONTAINER 4OZ

## (undated) DEVICE — DEVICE TISSUE REMOVAL HYSTEROSCOPIC MYOSURE LITE 30-401LITE

## (undated) DEVICE — KIT PATIENT POSITIONING PIGAZZI LATEX FREE 40580

## (undated) DEVICE — SEAL SET MYOSURE ROD LENS SCOPE SINGLE USE 40-902

## (undated) DEVICE — LINEN ORTHO PACK 5446

## (undated) DEVICE — TUBING CONMED AIRSEAL SMOKE EVAC INSUFFLATION ASM-EVAC

## (undated) DEVICE — PREP SCRUB SOL EXIDINE 4% CHG 4OZ 29002-404

## (undated) DEVICE — SU MONOCRYL 4-0 PS-2 27" UND Y426H

## (undated) DEVICE — GLOVE BIOGEL PI ULTRATOUCH SZ 6.5 41165

## (undated) DEVICE — DRAPE MICRO LASER 30X20"

## (undated) DEVICE — SWAB PROCTO 16" 2/PK 32-046

## (undated) DEVICE — DAVINCI XI SEAL UNIVERSAL 5-8MM 470361

## (undated) DEVICE — ADH SKIN CLOSURE PREMIERPRO EXOFIN 1.0ML 3470

## (undated) DEVICE — SUCTION IRR STRYKERFLOW II W/TIP 250-070-520

## (undated) DEVICE — DAVINCI XI DRAPE COLUMN 470341

## (undated) DEVICE — SUCTION CANISTER MEDIVAC LINER 3000ML W/LID 65651-530

## (undated) DEVICE — SYR 03ML LL W/O NDL 309657

## (undated) DEVICE — SOL NACL 0.9% IRRIG 3000ML BAG 2B7477

## (undated) DEVICE — SU WND CLOSURE VLOC 90 ABS 2-0 VIOLET 6" GS-22 VLOCM2105

## (undated) DEVICE — PACK MINOR LITHOTOMY RIDGES

## (undated) DEVICE — KIT PROCEDURE FLUENT IN/OUT FLOWPAK TISS TRAP FLT-112S

## (undated) RX ORDER — IODINE AND POTASSIUM IODIDE 50; 100 MG/ML; MG/ML
LIQUID ORAL
Status: DISPENSED
Start: 2024-01-31

## (undated) RX ORDER — PROPOFOL 10 MG/ML
INJECTION, EMULSION INTRAVENOUS
Status: DISPENSED
Start: 2022-09-28

## (undated) RX ORDER — BUPIVACAINE HYDROCHLORIDE 2.5 MG/ML
INJECTION, SOLUTION EPIDURAL; INFILTRATION; INTRACAUDAL
Status: DISPENSED
Start: 2022-09-28

## (undated) RX ORDER — OXYCODONE HYDROCHLORIDE 5 MG/1
TABLET ORAL
Status: DISPENSED
Start: 2022-09-28

## (undated) RX ORDER — HYDROMORPHONE HCL IN WATER/PF 6 MG/30 ML
PATIENT CONTROLLED ANALGESIA SYRINGE INTRAVENOUS
Status: DISPENSED
Start: 2022-09-28

## (undated) RX ORDER — ONDANSETRON 2 MG/ML
INJECTION INTRAMUSCULAR; INTRAVENOUS
Status: DISPENSED
Start: 2022-09-28

## (undated) RX ORDER — CLINDAMYCIN PHOSPHATE 900 MG/50ML
INJECTION, SOLUTION INTRAVENOUS
Status: DISPENSED
Start: 2022-09-28

## (undated) RX ORDER — DEXAMETHASONE SODIUM PHOSPHATE 4 MG/ML
INJECTION, SOLUTION INTRA-ARTICULAR; INTRALESIONAL; INTRAMUSCULAR; INTRAVENOUS; SOFT TISSUE
Status: DISPENSED
Start: 2022-09-28

## (undated) RX ORDER — ACETAMINOPHEN 325 MG/1
TABLET ORAL
Status: DISPENSED
Start: 2024-01-31

## (undated) RX ORDER — GLYCOPYRROLATE 0.2 MG/ML
INJECTION, SOLUTION INTRAMUSCULAR; INTRAVENOUS
Status: DISPENSED
Start: 2022-09-28

## (undated) RX ORDER — ACETIC ACID 3 %
LIQUID (ML) MISCELLANEOUS
Status: DISPENSED
Start: 2024-01-31

## (undated) RX ORDER — BUPIVACAINE HYDROCHLORIDE AND EPINEPHRINE 5; 5 MG/ML; UG/ML
INJECTION, SOLUTION EPIDURAL; INTRACAUDAL; PERINEURAL
Status: DISPENSED
Start: 2022-09-28

## (undated) RX ORDER — FENTANYL CITRATE 50 UG/ML
INJECTION, SOLUTION INTRAMUSCULAR; INTRAVENOUS
Status: DISPENSED
Start: 2024-01-31

## (undated) RX ORDER — HYDROMORPHONE HYDROCHLORIDE 2 MG/1
TABLET ORAL
Status: DISPENSED
Start: 2022-09-28

## (undated) RX ORDER — CLINDAMYCIN PHOSPHATE 900 MG/50ML
INJECTION, SOLUTION INTRAVENOUS
Status: DISPENSED
Start: 2024-01-31

## (undated) RX ORDER — LABETALOL HYDROCHLORIDE 5 MG/ML
INJECTION, SOLUTION INTRAVENOUS
Status: DISPENSED
Start: 2022-09-28

## (undated) RX ORDER — KETOROLAC TROMETHAMINE 30 MG/ML
INJECTION, SOLUTION INTRAMUSCULAR; INTRAVENOUS
Status: DISPENSED
Start: 2022-09-28

## (undated) RX ORDER — FENTANYL CITRATE 50 UG/ML
INJECTION, SOLUTION INTRAMUSCULAR; INTRAVENOUS
Status: DISPENSED
Start: 2022-09-28

## (undated) RX ORDER — SCOLOPAMINE TRANSDERMAL SYSTEM 1 MG/1
PATCH, EXTENDED RELEASE TRANSDERMAL
Status: DISPENSED
Start: 2022-09-28

## (undated) RX ORDER — FERRIC SUBSULFATE 0.21 G/G
LIQUID TOPICAL
Status: DISPENSED
Start: 2024-01-31

## (undated) RX ORDER — LIDOCAINE HYDROCHLORIDE 10 MG/ML
INJECTION, SOLUTION EPIDURAL; INFILTRATION; INTRACAUDAL; PERINEURAL
Status: DISPENSED
Start: 2022-09-28

## (undated) RX ORDER — KETOROLAC TROMETHAMINE 30 MG/ML
INJECTION, SOLUTION INTRAMUSCULAR; INTRAVENOUS
Status: DISPENSED
Start: 2024-01-31

## (undated) RX ORDER — INDOCYANINE GREEN AND WATER 25 MG
KIT INJECTION
Status: DISPENSED
Start: 2022-09-28

## (undated) RX ORDER — FENTANYL CITRATE-0.9 % NACL/PF 10 MCG/ML
PLASTIC BAG, INJECTION (ML) INTRAVENOUS
Status: DISPENSED
Start: 2022-09-28

## (undated) RX ORDER — ACETAMINOPHEN 325 MG/1
TABLET ORAL
Status: DISPENSED
Start: 2022-09-28